# Patient Record
Sex: FEMALE | Race: BLACK OR AFRICAN AMERICAN | NOT HISPANIC OR LATINO
[De-identification: names, ages, dates, MRNs, and addresses within clinical notes are randomized per-mention and may not be internally consistent; named-entity substitution may affect disease eponyms.]

---

## 2017-02-15 ENCOUNTER — HOSPITAL (OUTPATIENT)
Dept: OTHER | Age: 27
End: 2017-02-15
Attending: EMERGENCY MEDICINE

## 2017-02-15 LAB
ALBUMIN SERPL-MCNC: 3.9 GM/DL (ref 3.6–5.1)
ALBUMIN/GLOB SERPL: 0.8 {RATIO} (ref 1–2.4)
ALP SERPL-CCNC: 99 UNIT/L (ref 45–117)
ALT SERPL-CCNC: 26 UNIT/L
ANALYZER ANC (IANC): ABNORMAL
ANION GAP SERPL CALC-SCNC: 14 MMOL/L (ref 10–20)
APAP SERPL-MCNC: <2 MCG/ML (ref 10–30)
AST SERPL-CCNC: 23 UNIT/L
BASOPHILS # BLD: 0 THOUSAND/MCL (ref 0–0.3)
BASOPHILS NFR BLD: 0 %
BILIRUB SERPL-MCNC: 0.2 MG/DL (ref 0.2–1)
BUN SERPL-MCNC: 15 MG/DL (ref 10–20)
BUN/CREAT SERPL: 21 (ref 7–25)
CALCIUM SERPL-MCNC: 8.7 MG/DL (ref 8.4–10.2)
CHLORIDE: 106 MMOL/L (ref 98–107)
CO2 SERPL-SCNC: 26 MMOL/L (ref 21–32)
CREAT SERPL-MCNC: 0.72 MG/DL (ref 0.51–0.95)
DIFFERENTIAL METHOD BLD: ABNORMAL
EOSINOPHIL # BLD: 0 THOUSAND/MCL (ref 0.1–0.5)
EOSINOPHIL NFR BLD: 0 %
ERYTHROCYTE [DISTWIDTH] IN BLOOD: 13.6 % (ref 11–15)
ETHANOL SERPL-MCNC: 160 MG/DL
GLOBULIN SER-MCNC: 4.6 GM/DL (ref 2–4)
GLUCOSE BLDC GLUCOMTR-MCNC: 96 MG/DL (ref 65–99)
GLUCOSE SERPL-MCNC: 88 MG/DL (ref 65–99)
HEMATOCRIT: 45.5 % (ref 36–46.5)
HGB BLD-MCNC: 15 GM/DL (ref 12–15.5)
LYMPHOCYTES # BLD: 1.6 THOUSAND/MCL (ref 1–4.8)
LYMPHOCYTES NFR BLD: 19 %
MCH RBC QN AUTO: 30.5 PG (ref 26–34)
MCHC RBC AUTO-ENTMCNC: 33 GM/DL (ref 32–36.5)
MCV RBC AUTO: 92.5 FL (ref 78–100)
MONOCYTES # BLD: 0.3 THOUSAND/MCL (ref 0.3–0.9)
MONOCYTES NFR BLD: 4 %
NEUTROPHILS # BLD: 6.6 THOUSAND/MCL (ref 1.8–7.7)
NEUTROPHILS NFR BLD: 77 %
NEUTS SEG NFR BLD: ABNORMAL %
PERCENT NRBC: ABNORMAL
PLATELET # BLD: 280 THOUSAND/MCL (ref 140–450)
POTASSIUM SERPL-SCNC: 4.1 MMOL/L (ref 3.4–5.1)
PROT SERPL-MCNC: 8.5 GM/DL (ref 6.4–8.2)
RBC # BLD: 4.92 MILLION/MCL (ref 4–5.2)
SALICYLATES SERPL-MCNC: 3.1 MG/DL
SODIUM SERPL-SCNC: 142 MMOL/L (ref 135–145)
WBC # BLD: 8.6 THOUSAND/MCL (ref 4.2–11)

## 2017-12-06 ENCOUNTER — HOSPITAL ENCOUNTER (EMERGENCY)
Facility: HOSPITAL | Age: 27
Discharge: HOME | End: 2017-12-06
Attending: EMERGENCY MEDICINE
Payer: MEDICAID

## 2017-12-06 VITALS
SYSTOLIC BLOOD PRESSURE: 156 MMHG | RESPIRATION RATE: 20 BRPM | BODY MASS INDEX: 43.59 KG/M2 | HEART RATE: 81 BPM | OXYGEN SATURATION: 100 % | WEIGHT: 250 LBS | TEMPERATURE: 98 F | DIASTOLIC BLOOD PRESSURE: 82 MMHG

## 2017-12-06 DIAGNOSIS — F19.920 DRUG INTOXICATION WITHOUT COMPLICATION: Primary | ICD-10-CM

## 2017-12-06 LAB — ETHANOL SERPL-MCNC: 163 MG/DL

## 2017-12-06 PROCEDURE — 99284 EMERGENCY DEPT VISIT MOD MDM: CPT

## 2017-12-06 PROCEDURE — 80320 DRUG SCREEN QUANTALCOHOLS: CPT

## 2017-12-06 NOTE — ED PROVIDER NOTES
Encounter Date: 12/6/2017       History     Chief Complaint   Patient presents with    Alcohol Intoxication     EMS states she drank a bottle of wine and wants to be checked out.     The patient drank a bottle of wine and is now intoxicated.  She is requesting to be checked out.      The history is provided by the patient and the EMS personnel.   Ingestion    She came to the ER via by ambulance. The current episode started just prior to arrival. Ingested substance: wine. There are no indicators of a suicidal attempt. She will not answer as to the context of the ingestion. The incident was witnessed. The incident was witnessed/reported by the patient. Her past medical history does not include suicide attempts, depression or suicide in family.     Review of patient's allergies indicates:  No Known Allergies  Past Medical History:   Diagnosis Date    Allergy      History reviewed. No pertinent surgical history.  History reviewed. No pertinent family history.  Social History   Substance Use Topics    Smoking status: Current Every Day Smoker     Packs/day: 0.50     Types: Cigarettes    Smokeless tobacco: Never Used    Alcohol use 3.6 oz/week     6 Glasses of wine per week      Comment: 6 bottles in last 3 days     Review of Systems   Neurological: Positive for speech difficulty and light-headedness. Negative for seizures, syncope and weakness.   Psychiatric/Behavioral: Positive for confusion.   All other systems reviewed and are negative.      Physical Exam     Initial Vitals [12/06/17 0144]   BP Pulse Resp Temp SpO2   122/72 85 20 98.2 °F (36.8 °C) 96 %      MAP       88.67         Physical Exam    Nursing note and vitals reviewed.  Constitutional: She appears well-developed and well-nourished. She appears lethargic.   HENT:   Head: Normocephalic and atraumatic.   Eyes: EOM are normal.   Neck: Normal range of motion. Neck supple.   Cardiovascular: Normal rate, regular rhythm, normal heart sounds and intact distal  pulses.   Pulmonary/Chest: Breath sounds normal.   Abdominal: Soft.   Musculoskeletal: Normal range of motion.   Neurological: She appears lethargic. GCS eye subscore is 4. GCS verbal subscore is 5. GCS motor subscore is 6.   Skin: Skin is warm and dry.         ED Course   Procedures  Labs Reviewed   ALCOHOL,MEDICAL (ETHANOL)             Medical Decision Making:   Clinical Tests:   Lab Tests: Ordered and Reviewed                   ED Course      Clinical Impression:   The encounter diagnosis was Drug intoxication without complication.    Disposition:   Disposition: Discharged  Condition: Stable                        Veronique Dubon MD  12/06/17 0431

## 2018-04-19 ENCOUNTER — HOSPITAL ENCOUNTER (EMERGENCY)
Facility: HOSPITAL | Age: 28
Discharge: PSYCHIATRIC OR SUBSTANCE USE FACILITY | End: 2018-04-20
Attending: FAMILY MEDICINE
Payer: MEDICAID

## 2018-04-19 DIAGNOSIS — F22 DELUSIONAL DISORDER: Primary | ICD-10-CM

## 2018-04-19 LAB
ALBUMIN SERPL BCP-MCNC: 4.2 G/DL
ALP SERPL-CCNC: 89 U/L
ALT SERPL W/O P-5'-P-CCNC: 26 U/L
AMPHET+METHAMPHET UR QL: NEGATIVE
ANION GAP SERPL CALC-SCNC: 11 MMOL/L
APAP SERPL-MCNC: <10 UG/ML
AST SERPL-CCNC: 23 U/L
B-HCG UR QL: NEGATIVE
BARBITURATES UR QL SCN>200 NG/ML: NEGATIVE
BASOPHILS # BLD AUTO: 0.02 K/UL
BASOPHILS NFR BLD: 0.3 %
BENZODIAZ UR QL SCN>200 NG/ML: NEGATIVE
BILIRUB SERPL-MCNC: 0.4 MG/DL
BILIRUB UR QL STRIP: NEGATIVE
BUN SERPL-MCNC: 12 MG/DL
BZE UR QL SCN: NEGATIVE
CALCIUM SERPL-MCNC: 9.7 MG/DL
CANNABINOIDS UR QL SCN: NEGATIVE
CHLORIDE SERPL-SCNC: 107 MMOL/L
CLARITY UR REFRACT.AUTO: CLEAR
CO2 SERPL-SCNC: 25 MMOL/L
COLOR UR AUTO: ABNORMAL
CREAT SERPL-MCNC: 0.89 MG/DL
CREAT UR-MCNC: >346.5 MG/DL
DIFFERENTIAL METHOD: NORMAL
EOSINOPHIL # BLD AUTO: 0 K/UL
EOSINOPHIL NFR BLD: 0.5 %
ERYTHROCYTE [DISTWIDTH] IN BLOOD BY AUTOMATED COUNT: 13 %
EST. GFR  (AFRICAN AMERICAN): >60 ML/MIN/1.73 M^2
EST. GFR  (NON AFRICAN AMERICAN): >60 ML/MIN/1.73 M^2
ETHANOL SERPL-MCNC: <10 MG/DL
GLUCOSE SERPL-MCNC: 96 MG/DL
GLUCOSE UR QL STRIP: NEGATIVE
HCT VFR BLD AUTO: 41.9 %
HGB BLD-MCNC: 13.6 G/DL
HGB UR QL STRIP: NEGATIVE
KETONES UR QL STRIP: ABNORMAL
LEUKOCYTE ESTERASE UR QL STRIP: NEGATIVE
LYMPHOCYTES # BLD AUTO: 2.8 K/UL
LYMPHOCYTES NFR BLD: 38.2 %
MCH RBC QN AUTO: 29.1 PG
MCHC RBC AUTO-ENTMCNC: 32.5 G/DL
MCV RBC AUTO: 90 FL
METHADONE UR QL SCN>300 NG/ML: NEGATIVE
MONOCYTES # BLD AUTO: 0.8 K/UL
MONOCYTES NFR BLD: 11.5 %
NEUTROPHILS # BLD AUTO: 3.6 K/UL
NEUTROPHILS NFR BLD: 49.4 %
NITRITE UR QL STRIP: NEGATIVE
OPIATES UR QL SCN: NEGATIVE
PCP UR QL SCN>25 NG/ML: NEGATIVE
PH UR STRIP: 5 [PH] (ref 5–8)
PLATELET # BLD AUTO: 271 K/UL
PMV BLD AUTO: 10.3 FL
POTASSIUM SERPL-SCNC: 4 MMOL/L
PROT SERPL-MCNC: 7.9 G/DL
PROT UR QL STRIP: ABNORMAL
RBC # BLD AUTO: 4.68 M/UL
SODIUM SERPL-SCNC: 143 MMOL/L
SP GR UR STRIP: 1.02 (ref 1–1.03)
TOXICOLOGY INFORMATION: ABNORMAL
URN SPEC COLLECT METH UR: ABNORMAL
UROBILINOGEN UR STRIP-ACNC: 1 EU/DL
WBC # BLD AUTO: 7.33 K/UL

## 2018-04-19 PROCEDURE — 81025 URINE PREGNANCY TEST: CPT

## 2018-04-19 PROCEDURE — 85025 COMPLETE CBC W/AUTO DIFF WBC: CPT

## 2018-04-19 PROCEDURE — 80329 ANALGESICS NON-OPIOID 1 OR 2: CPT

## 2018-04-19 PROCEDURE — 80307 DRUG TEST PRSMV CHEM ANLYZR: CPT

## 2018-04-19 PROCEDURE — 81003 URINALYSIS AUTO W/O SCOPE: CPT

## 2018-04-19 PROCEDURE — 80053 COMPREHEN METABOLIC PANEL: CPT

## 2018-04-19 PROCEDURE — 99285 EMERGENCY DEPT VISIT HI MDM: CPT

## 2018-04-19 PROCEDURE — G0425 INPT/ED TELECONSULT30: HCPCS | Mod: GT,AF,HB, | Performed by: PSYCHIATRY & NEUROLOGY

## 2018-04-19 PROCEDURE — 80320 DRUG SCREEN QUANTALCOHOLS: CPT

## 2018-04-20 VITALS
OXYGEN SATURATION: 100 % | RESPIRATION RATE: 18 BRPM | BODY MASS INDEX: 40.75 KG/M2 | DIASTOLIC BLOOD PRESSURE: 81 MMHG | HEART RATE: 70 BPM | HEIGHT: 63 IN | WEIGHT: 230 LBS | TEMPERATURE: 98 F | SYSTOLIC BLOOD PRESSURE: 124 MMHG

## 2018-04-20 NOTE — ED PROVIDER NOTES
"Encounter Date: 4/19/2018       History     Chief Complaint   Patient presents with    Psychiatric Evaluation     Pt states she had a "spiritual thing" happen at home and expressed to her family that something was going to happen to her family. Pt denies SI and HI.     Patient is brought into the ER by family for evaluation.  Family talked to patient and left.  Patient says she has been having spiritual dreams which has been worrying her.  She is worried about herself and family something bad going to happen.  Patient dreams people who do not believe car R scaring her.  Denies any suicidal, homicidal ideations.  She denies any visual or auditory hallucinations.  Patient has a chronic alcohol problem for the last few months.  She has been seeing psychologist for counseling.  Her last drink was about a week ago.  Patient also has behavioral issues which she did not want to discuss with me.  Patient agreed to call her sister.  Patient's sister describes that patient has been talking to herself and delusional.  She thinks people are coming out to kill her and she is trying to defend and kill them.  Patient has been closing dose and keeping away from people so that nobody can kill her.  Today she jumped out of the window because thought somebody came to kill her.  Patient also thinks somebody is transferring her soles.  She's been repeatedly calling her family and telling them that somebody is going to come and kill them to.      The history is provided by the patient.     Review of patient's allergies indicates:  No Known Allergies  Past Medical History:   Diagnosis Date    Allergy      History reviewed. No pertinent surgical history.  History reviewed. No pertinent family history.  Social History   Substance Use Topics    Smoking status: Current Every Day Smoker     Packs/day: 0.50     Types: Cigarettes, Cigars    Smokeless tobacco: Never Used    Alcohol use 8.4 oz/week     14 Cans of beer per week     Review of " Systems   Constitutional: Negative for activity change, appetite change and fever.   HENT: Negative for congestion, rhinorrhea and sore throat.    Eyes: Negative for pain, discharge, redness and itching.   Respiratory: Negative for cough and shortness of breath.    Cardiovascular: Negative for chest pain.   Gastrointestinal: Negative for abdominal distention, abdominal pain, diarrhea, nausea and vomiting.   Genitourinary: Negative for dysuria and frequency.   Musculoskeletal: Negative for back pain, gait problem, neck pain and neck stiffness.   Skin: Negative for rash.   Neurological: Negative for dizziness, facial asymmetry, weakness, light-headedness and headaches.   Hematological: Does not bruise/bleed easily.   Psychiatric/Behavioral: Positive for behavioral problems and sleep disturbance. Negative for confusion, decreased concentration, hallucinations, self-injury and suicidal ideas. The patient is not nervous/anxious.    All other systems reviewed and are negative.      Physical Exam     Initial Vitals [04/19/18 1921]   BP Pulse Resp Temp SpO2   128/86 87 18 98.6 °F (37 °C) 98 %      MAP       100         Physical Exam    Nursing note and vitals reviewed.  Constitutional: She appears well-developed and well-nourished.   HENT:   Head: Normocephalic.   Right Ear: External ear normal.   Left Ear: External ear normal.   Nose: Nose normal.   Mouth/Throat: Oropharynx is clear and moist.   Eyes: EOM are normal. Pupils are equal, round, and reactive to light.   Neck: Normal range of motion. Neck supple.   Cardiovascular: Normal rate, regular rhythm, normal heart sounds and intact distal pulses.   Pulmonary/Chest: Breath sounds normal. No respiratory distress. She has no wheezes. She has no rhonchi. She has no rales. She exhibits no tenderness.   Abdominal: Soft. Bowel sounds are normal. She exhibits no distension. There is no tenderness.   Musculoskeletal: Normal range of motion.   Neurological: She is alert and  oriented to person, place, and time. She has normal strength and normal reflexes. She displays normal reflexes. No cranial nerve deficit or sensory deficit.   Skin: Skin is warm. Capillary refill takes less than 2 seconds. No rash noted. No erythema.   Psychiatric: Her speech is normal and behavior is normal. Judgment normal. Her mood appears anxious. Thought content is paranoid and delusional. Cognition and memory are normal. She expresses no homicidal and no suicidal ideation.         ED Course   Procedures  Labs Reviewed   URINALYSIS - Abnormal; Notable for the following:        Result Value    Protein, UA Trace (*)     Ketones, UA 1+ (*)     All other components within normal limits   PREGNANCY TEST, URINE RAPID   DRUG SCREEN PANEL, URINE EMERGENCY             Medical Decision Making:   Initial Assessment:   Patient has been having delusional symptoms for the last 3 months.  Unable to cope up at home.  Denied medical care family brought to the ER for evaluation.  Patient tries to defend herself of not having any delusions but agreed to have dreams which are causing her severe anxiety and unable to cope up with her life.  She thinks people are killing her and tries to run away.  She thinks.  Spirits are changing  to different body..   Differential Diagnosis:       Indurated, raised, hallucination, bipolar, schizophrenia.  Drug influence.  Alcohol induced, delirium, alcohol abuse  Clinical Tests:   Lab Tests: Ordered and Reviewed  ED Management:  Patient has been experiencing delusional since last few months and unable to take care of herself and denied medical care.  Patient has been adamant to see a physician.  N also has chronic alcohol abuse.  Patient has been seeing a psychologist or rehabilitation.  Patient's sister helped in her history for having delusional disorder.  We'll consult psychiatrist to evaluate for PEC.  Other:   I have discussed this case with another health care provider.       <> Summary of  the Discussion: Dr. Chavarria psychiatrist has been consulted who advised for PEC for inpatient treatment.                  patient is medically cleared for inpatient psychiatric treatment.     Clinical Impression:   The encounter diagnosis was Delusional disorder.    Disposition:   Disposition: Transferred  Condition: Serious                        Neo Soriano MD  04/19/18 7282

## 2018-04-20 NOTE — ED NOTES
Spoke to Karol @ [facility]......advised no call back for Psych Consult as of yet.

## 2018-04-20 NOTE — ED NOTES
Patient is lying in bed with no signs or symptoms of distress noted.  Will continue to monitor patient.  Sitter at bedside.

## 2018-04-20 NOTE — CONSULTS
Tele-Consultation to Emergency Department from Psychiatry  1030  Please see previous notes:    Patient agreeable to consultation via telepsychiatry.    Consultation started: 4/19/2018 at 1030PM  The chief complaint leading to psychiatric consultation is: paranoia  This consultation was requested by , the Emergency Department attending physician.  The location of the consulting psychiatrist is [address] [place] [address].  The patient location is [facility].  The patient arrived at the ED at:     Also present with the patient at the time of the consultation: self    Patient Identification:  Ace Sheffield is a 27 y.o. female.    Patient information was obtained from patient.  Patient presented involuntarily to the Emergency Department     History of Present Illness:  Ms. Navarro is a 27 year old woman with the primary diagnosis of Psychotic Disorder, NOS. She presented to the hospital reporting visual and auditory hallucinations. She reports paranoid delusions that someone is trying to her attack her family .she reportedly jumped out of a window fearing that she was going to be harmed. She reports prior psychiatric treatment and prior psychiatric hospitalization.     Psychiatric History:   Hospitalization: Yes  Medication Trials: Yes  Suicide Attempts: no  Violence: no  Depression: no  Rossy: yes  AH's: yes  Delusions: paranoid    Review of Systems:  Negative    Past Medical History:   Past Medical History:   Diagnosis Date    Allergy         Seizures: no  Head trauma/l.o.c.: no  Wish to become pregnant[if female of childbearing age]: no    Allergies: none  Review of patient's allergies indicates:  No Known Allergies    Medications in ER: Medications - No data to display    Medications at home: unknown    Substance Abuse History:   Alchohol: no  Drug: no     Legal History:   Past charges/incarcerations: no  Pending charges: no    Family Psychiatric History: no    Social History:   History of  "Physical/Sexual Abuse: no  Education: hs    Employment/Disability: disabled   Financial: ok  Relationship Status/Sexual Orientation: hetero   Children: unknown   Housing Status: home  [employer]: [employer]   History: none   Recreational Activities: none  Access to Gun: no     Current Evaluation:     Constitutional  Vitals:  Vitals:    04/19/18 1921   BP: 128/86   Pulse: 87   Resp: 18   Temp: 98.6 °F (37 °C)   TempSrc: Oral   SpO2: 98%   Weight: 104.3 kg (230 lb)   Height: 5' 3" (1.6 m)      General:  unremarkable, age appropriate     Musculoskeletal  Muscle Strength/Tone:   moving arms normally   Gait & Station:   sitting on stretcher     Psychiatric  Level of Consciousness: alert  Orientation: oriented to person, place and time  Grooming: in hospital gown  Psychomotor Behavior: no agitation  Speech: normal in rate, rhythm and volume  Language: uses words appropriately  Mood: depressed  Affect: depressed  Thought Process: linear  Associations: tight  Thought Content: ah  Memory: intact  Attention: intact to interview  Fund of Knowledge: appears adequate  Insight: poor  Judgement: poor    Relevant Elements of Neurological Exam: no abnormality of posture noted    Assessment - Diagnosis - Goals:     Diagnosis/Impression: psychotic disorder, now    Rec: She requires inpatient psychiatric hospitalization     Time with patient: 20 minutes.     More than 50% of the time was spent counseling/coordinating care    Laboratory Data:   Labs Reviewed   URINALYSIS - Abnormal; Notable for the following:        Result Value    Protein, UA Trace (*)     Ketones, UA 1+ (*)     All other components within normal limits   DRUG SCREEN PANEL, URINE EMERGENCY - Abnormal; Notable for the following:     Creatinine, Random Ur >346.5 (*)     All other components within normal limits   PREGNANCY TEST, URINE RAPID   CBC W/ AUTO DIFFERENTIAL   COMPREHENSIVE METABOLIC PANEL   ALCOHOL,MEDICAL (ETHANOL)   ACETAMINOPHEN LEVEL         Consulting " clinician was informed of the encounter and consult note.    Consultation ended: 4/19/2018 at **

## 2018-04-20 NOTE — ED NOTES
Pt belongings: 1 white t-shirt, 1 pair of jeans, 1 pair of white tennis shoes, 1 pair of white socks, 1 purple head scarf, 1 concha jacket, 1 pair of sunglasses, and blue bag with personal belongings. Cell phone and  placed in personal blue bag by pt. Belongings locked away in locked cabinet.

## 2018-04-20 NOTE — ED NOTES
Pt accepted to [facility]. Accepting Dr. Tarah MD Call report @ [phone] per ALEM Tobar

## 2018-04-20 NOTE — ED NOTES
Patient is lying in bed with eyes closed.  No signs or symptoms of distress noted.  Will continue to monitor patient.  Sitter at bedside.

## 2021-05-04 ENCOUNTER — OFFICE VISIT (OUTPATIENT)
Dept: OBSTETRICS AND GYNECOLOGY | Facility: CLINIC | Age: 31
End: 2021-05-04
Payer: MEDICAID

## 2021-05-04 VITALS
HEIGHT: 63 IN | DIASTOLIC BLOOD PRESSURE: 84 MMHG | BODY MASS INDEX: 51.91 KG/M2 | WEIGHT: 293 LBS | SYSTOLIC BLOOD PRESSURE: 122 MMHG

## 2021-05-04 DIAGNOSIS — Z11.3 SCREENING EXAMINATION FOR STD (SEXUALLY TRANSMITTED DISEASE): ICD-10-CM

## 2021-05-04 DIAGNOSIS — Z01.419 ENCOUNTER FOR ANNUAL ROUTINE GYNECOLOGICAL EXAMINATION: Primary | ICD-10-CM

## 2021-05-04 DIAGNOSIS — Z12.4 PAP SMEAR FOR CERVICAL CANCER SCREENING: ICD-10-CM

## 2021-05-04 PROCEDURE — 99999 PR PBB SHADOW E&M-NEW PATIENT-LVL III: ICD-10-PCS | Mod: PBBFAC,,, | Performed by: OBSTETRICS & GYNECOLOGY

## 2021-05-04 PROCEDURE — 99385 PR PREVENTIVE VISIT,NEW,18-39: ICD-10-PCS | Mod: S$PBB,,, | Performed by: OBSTETRICS & GYNECOLOGY

## 2021-05-04 PROCEDURE — 88175 CYTOPATH C/V AUTO FLUID REDO: CPT | Performed by: OBSTETRICS & GYNECOLOGY

## 2021-05-04 PROCEDURE — 87481 CANDIDA DNA AMP PROBE: CPT | Mod: 59 | Performed by: OBSTETRICS & GYNECOLOGY

## 2021-05-04 PROCEDURE — 99385 PREV VISIT NEW AGE 18-39: CPT | Mod: S$PBB,,, | Performed by: OBSTETRICS & GYNECOLOGY

## 2021-05-04 PROCEDURE — 99203 OFFICE O/P NEW LOW 30 MIN: CPT | Mod: PBBFAC,PN | Performed by: OBSTETRICS & GYNECOLOGY

## 2021-05-04 PROCEDURE — 87624 HPV HI-RISK TYP POOLED RSLT: CPT | Performed by: OBSTETRICS & GYNECOLOGY

## 2021-05-04 PROCEDURE — 99999 PR PBB SHADOW E&M-NEW PATIENT-LVL III: CPT | Mod: PBBFAC,,, | Performed by: OBSTETRICS & GYNECOLOGY

## 2021-05-04 RX ORDER — RISPERIDONE 2 MG/1
2 TABLET ORAL NIGHTLY
COMMUNITY
Start: 2021-04-21 | End: 2022-12-15

## 2021-05-04 RX ORDER — ARIPIPRAZOLE 400 MG
400 KIT INTRAMUSCULAR
COMMUNITY
Start: 2021-04-16

## 2021-05-04 RX ORDER — TRAZODONE HYDROCHLORIDE 50 MG/1
50 TABLET ORAL NIGHTLY
COMMUNITY
Start: 2021-04-21

## 2021-05-04 RX ORDER — SPIRONOLACTONE 25 MG/1
25 TABLET ORAL 2 TIMES DAILY
COMMUNITY
Start: 2021-02-25 | End: 2022-12-15

## 2021-05-04 RX ORDER — NORELGESTROMIN AND ETHINYL ESTRADIOL 35; 150 UG/MG; UG/MG
1 PATCH TRANSDERMAL WEEKLY
Qty: 4 PATCH | Refills: 11 | Status: SHIPPED | OUTPATIENT
Start: 2021-05-04 | End: 2022-05-04

## 2021-05-04 RX ORDER — MINOCYCLINE HYDROCHLORIDE 100 MG/1
100 CAPSULE ORAL 2 TIMES DAILY
COMMUNITY
Start: 2021-02-25

## 2021-05-06 ENCOUNTER — PATIENT MESSAGE (OUTPATIENT)
Dept: OBSTETRICS AND GYNECOLOGY | Facility: CLINIC | Age: 31
End: 2021-05-06

## 2021-05-06 LAB
BACTERIAL VAGINOSIS DNA: POSITIVE
CANDIDA GLABRATA DNA: NEGATIVE
CANDIDA KRUSEI DNA: NEGATIVE
CANDIDA RRNA VAG QL PROBE: NEGATIVE
T VAGINALIS RRNA GENITAL QL PROBE: NEGATIVE

## 2021-05-06 RX ORDER — METRONIDAZOLE 500 MG/1
500 TABLET ORAL EVERY 12 HOURS
Qty: 14 TABLET | Refills: 0 | Status: SHIPPED | OUTPATIENT
Start: 2021-05-06 | End: 2021-05-13

## 2021-05-07 LAB
C TRACH RRNA SPEC QL NAA+PROBE: NEGATIVE
N GONORRHOEA RRNA SPEC QL NAA+PROBE: NEGATIVE

## 2021-05-10 ENCOUNTER — PATIENT MESSAGE (OUTPATIENT)
Dept: OBSTETRICS AND GYNECOLOGY | Facility: CLINIC | Age: 31
End: 2021-05-10

## 2021-05-10 ENCOUNTER — PATIENT MESSAGE (OUTPATIENT)
Dept: OBSTETRICS AND GYNECOLOGY | Facility: HOSPITAL | Age: 31
End: 2021-05-10

## 2021-05-12 LAB
HPV HR 12 DNA SPEC QL NAA+PROBE: NEGATIVE
HPV16 AG SPEC QL: NEGATIVE
HPV18 DNA SPEC QL NAA+PROBE: NEGATIVE

## 2021-05-13 ENCOUNTER — PATIENT MESSAGE (OUTPATIENT)
Dept: OBSTETRICS AND GYNECOLOGY | Facility: CLINIC | Age: 31
End: 2021-05-13

## 2021-05-13 LAB
FINAL PATHOLOGIC DIAGNOSIS: NORMAL
Lab: NORMAL

## 2021-10-02 ENCOUNTER — PATIENT MESSAGE (OUTPATIENT)
Dept: OBSTETRICS AND GYNECOLOGY | Facility: CLINIC | Age: 31
End: 2021-10-02

## 2021-12-12 ENCOUNTER — HOSPITAL ENCOUNTER (EMERGENCY)
Facility: HOSPITAL | Age: 31
Discharge: HOME | End: 2021-12-12
Attending: FAMILY MEDICINE
Payer: MEDICAID

## 2021-12-12 VITALS
HEIGHT: 64 IN | SYSTOLIC BLOOD PRESSURE: 150 MMHG | DIASTOLIC BLOOD PRESSURE: 85 MMHG | BODY MASS INDEX: 50.02 KG/M2 | TEMPERATURE: 99 F | RESPIRATION RATE: 17 BRPM | HEART RATE: 104 BPM | OXYGEN SATURATION: 99 % | WEIGHT: 293 LBS

## 2021-12-12 DIAGNOSIS — H10.32 ACUTE BACTERIAL CONJUNCTIVITIS OF LEFT EYE: Primary | ICD-10-CM

## 2021-12-12 PROCEDURE — 99283 EMERGENCY DEPT VISIT LOW MDM: CPT | Mod: ER

## 2021-12-12 PROCEDURE — 25000003 PHARM REV CODE 250: Mod: ER | Performed by: PHYSICIAN ASSISTANT

## 2021-12-12 RX ORDER — OFLOXACIN 3 MG/ML
SOLUTION/ DROPS OPHTHALMIC
Qty: 5 ML | Refills: 0 | Status: SHIPPED | OUTPATIENT
Start: 2021-12-12 | End: 2021-12-19

## 2021-12-12 RX ORDER — PROPARACAINE HYDROCHLORIDE 5 MG/ML
1 SOLUTION/ DROPS OPHTHALMIC
Status: COMPLETED | OUTPATIENT
Start: 2021-12-12 | End: 2021-12-12

## 2021-12-12 RX ADMIN — FLUORESCEIN SODIUM 1 EACH: 1 STRIP OPHTHALMIC at 01:12

## 2021-12-12 RX ADMIN — PROPARACAINE HYDROCHLORIDE 1 DROP: 5 SOLUTION/ DROPS OPHTHALMIC at 01:12

## 2022-01-04 ENCOUNTER — HOSPITAL ENCOUNTER (EMERGENCY)
Facility: HOSPITAL | Age: 32
Discharge: HOME | End: 2022-01-04
Attending: EMERGENCY MEDICINE
Payer: MEDICAID

## 2022-01-04 VITALS
HEART RATE: 88 BPM | DIASTOLIC BLOOD PRESSURE: 85 MMHG | WEIGHT: 293 LBS | TEMPERATURE: 99 F | BODY MASS INDEX: 50.02 KG/M2 | HEIGHT: 64 IN | SYSTOLIC BLOOD PRESSURE: 164 MMHG | OXYGEN SATURATION: 99 % | RESPIRATION RATE: 18 BRPM

## 2022-01-04 DIAGNOSIS — Z20.822 SUSPECTED COVID-19 VIRUS INFECTION: Primary | ICD-10-CM

## 2022-01-04 DIAGNOSIS — B34.9 VIRAL SYNDROME: ICD-10-CM

## 2022-01-04 PROCEDURE — U0003 INFECTIOUS AGENT DETECTION BY NUCLEIC ACID (DNA OR RNA); SEVERE ACUTE RESPIRATORY SYNDROME CORONAVIRUS 2 (SARS-COV-2) (CORONAVIRUS DISEASE [COVID-19]), AMPLIFIED PROBE TECHNIQUE, MAKING USE OF HIGH THROUGHPUT TECHNOLOGIES AS DESCRIBED BY CMS-2020-01-R: HCPCS | Mod: ER | Performed by: PHYSICIAN ASSISTANT

## 2022-01-04 PROCEDURE — 99284 EMERGENCY DEPT VISIT MOD MDM: CPT | Mod: ER

## 2022-01-04 PROCEDURE — U0005 INFEC AGEN DETEC AMPLI PROBE: HCPCS | Performed by: PHYSICIAN ASSISTANT

## 2022-01-04 RX ORDER — ONDANSETRON 4 MG/1
4 TABLET, ORALLY DISINTEGRATING ORAL EVERY 8 HOURS PRN
Qty: 12 TABLET | Refills: 0 | Status: SHIPPED | OUTPATIENT
Start: 2022-01-04 | End: 2022-01-08

## 2022-01-04 RX ORDER — FLUTICASONE PROPIONATE 50 MCG
1 SPRAY, SUSPENSION (ML) NASAL 2 TIMES DAILY PRN
Qty: 15 G | Refills: 0 | Status: SHIPPED | OUTPATIENT
Start: 2022-01-04 | End: 2022-12-15

## 2022-01-04 NOTE — ED PROVIDER NOTES
Encounter Date: 1/4/2022       History     Chief Complaint   Patient presents with    COVID-19 Concerns     + exposure - body aches and sinus congestion, also no taste      Pt is a 32 y/o female who presents to ED with c/o headaches, chills, nasal congestion, loss of taste/smell. Onset 3 days ago. Reports 1 episode of emesis. Pt reports sister is covid positive.        Review of patient's allergies indicates:  No Known Allergies  Past Medical History:   Diagnosis Date    Allergy      Past Surgical History:   Procedure Laterality Date    APPENDECTOMY  2009     Family History   Problem Relation Age of Onset    Breast cancer Neg Hx     Colon cancer Neg Hx     Ovarian cancer Neg Hx      Social History     Tobacco Use    Smoking status: Current Every Day Smoker     Packs/day: 0.25     Types: Cigarettes, Cigars    Smokeless tobacco: Never Used   Substance Use Topics    Alcohol use: Not Currently     Alcohol/week: 14.0 standard drinks     Types: 14 Cans of beer per week    Drug use: No     Review of Systems   Constitutional: Positive for chills. Negative for diaphoresis, fatigue and fever.   HENT: Positive for congestion. Negative for rhinorrhea and sore throat.         + loss of taste/smell    Respiratory: Negative for cough and shortness of breath.    Cardiovascular: Negative for chest pain.   Gastrointestinal: Negative for diarrhea, nausea and vomiting.   Musculoskeletal: Negative for arthralgias, back pain and myalgias.   Skin: Negative for rash.   Neurological: Positive for headaches. Negative for weakness.       Physical Exam     Initial Vitals [01/04/22 1412]   BP Pulse Resp Temp SpO2   (!) 164/85 88 18 98.8 °F (37.1 °C) 99 %      MAP       --         Physical Exam    Nursing note and vitals reviewed.  Constitutional: She appears well-developed and well-nourished. She is not diaphoretic. No distress.   HENT:   Head: Normocephalic and atraumatic.   Eyes: Conjunctivae and EOM are normal. Pupils are equal,  round, and reactive to light.   Neck: Neck supple.   Normal range of motion.  Cardiovascular: Normal rate, regular rhythm, normal heart sounds and intact distal pulses.   Pulmonary/Chest: Breath sounds normal. No respiratory distress.   Abdominal: Abdomen is soft. Bowel sounds are normal. There is no abdominal tenderness.   Musculoskeletal:         General: No tenderness or edema. Normal range of motion.      Cervical back: Normal range of motion and neck supple.     Neurological: She is alert and oriented to person, place, and time. She has normal strength. GCS score is 15. GCS eye subscore is 4. GCS verbal subscore is 5. GCS motor subscore is 6.   Skin: Skin is warm. Capillary refill takes less than 2 seconds. No rash noted.         ED Course   Procedures  Labs Reviewed   SARS-COV-2 (COVID-19) QUALITATIVE PCR          Imaging Results    None          Medications - No data to display  Medical Decision Making:   ED Management:  COVID pending.   Vital signs are stable, nontoxic appearing.  No respiratory distress.  Patient will be discharged home.  Discussed CDC guidelines if patient were to test positive for COVID.  Discussed symptomatic and supportive care measures for viral illness.  ED precautions were discussed to return for any worsening or change in symptoms.  Patient voiced understanding and agreed with the plan of care.  All questions were answered.               ED Course as of 01/04/22 1653   Tue Jan 04, 2022   1411 Pt is a 32 y/o female who presents to ED with c/o headaches, chills, nasal congestion, loss of taste/smell. Onset 3 days ago. Reports 1 episode of emesis. Pt reports sister is covid positive.  [EM]      ED Course User Index  [EM] Ping Sheldon PA-C             Clinical Impression:   Final diagnoses:  [Z20.822] Suspected COVID-19 virus infection (Primary)  [B34.9] Viral syndrome          ED Disposition Condition    Discharge Stable        ED Prescriptions     Medication Sig Dispense Start  Date End Date Auth. Provider    ondansetron (ZOFRAN-ODT) 4 MG TbDL Take 1 tablet (4 mg total) by mouth every 8 (eight) hours as needed (nausea). 12 tablet 1/4/2022 1/8/2022 Ping Sheldon PA-C    fluticasone propionate (FLONASE) 50 mcg/actuation nasal spray 1 spray (50 mcg total) by Each Nostril route 2 (two) times daily as needed for Rhinitis. 15 g 1/4/2022  Ping Sheldon PA-C        Follow-up Information     Follow up With Specialties Details Why Contact Info    Primary Care Physician               Ping Sheldon PA-C  01/04/22 7284

## 2022-01-05 ENCOUNTER — PATIENT MESSAGE (OUTPATIENT)
Dept: ADMINISTRATIVE | Facility: OTHER | Age: 32
End: 2022-01-05
Payer: MEDICAID

## 2022-01-06 LAB
SARS-COV-2 RNA RESP QL NAA+PROBE: DETECTED
SARS-COV-2- CYCLE NUMBER: 19

## 2022-03-26 ENCOUNTER — TELEPHONE (OUTPATIENT)
Dept: FAMILY MEDICINE | Facility: CLINIC | Age: 32
End: 2022-03-26
Payer: MEDICAID

## 2022-03-26 NOTE — TELEPHONE ENCOUNTER
Pt is now scheduled to establish care with  ----- Message from John George sent at 3/25/2022  4:46 PM CDT -----  Type:  Sooner Apoointment Request    Caller is requesting a sooner appointment.    Name of Caller:Ace Sheffield  When is the first available appointment?no avail  Symptoms:check up  Would the patient rather a call back or a response via [facility]? Call back  Best Call Back Number:[phone]  Additional Information: Patient requesting call back for a sooner appointment.

## 2022-03-29 ENCOUNTER — TELEPHONE (OUTPATIENT)
Dept: FAMILY MEDICINE | Facility: CLINIC | Age: 32
End: 2022-03-29
Payer: MEDICAID

## 2022-03-29 DIAGNOSIS — M72.2 PLANTAR FASCIAL FIBROMATOSIS: Primary | ICD-10-CM

## 2022-03-29 NOTE — TELEPHONE ENCOUNTER
Calling pt to discuss appt ----- Message from Eliz Borden sent at 3/29/2022 11:29 AM CDT -----  Contact: pt    Type:  Needs Medical Advice    Who Called: pt  Symptoms (please be specific):    How long has patient had these symptoms:  Pharmacy name and phone #:    Would the patient rather a call back or a response via [facility]?   Best Call Back Number: [phone]  Additional Information: pt has appt with Dr. Randall 5/10 but really kimberli prefer a female physician. Pt is aware that provider doesn't take medicaid . Pt asked to to please ask provider since she is listed under the [facility] portal taking medicaid .

## 2022-03-31 ENCOUNTER — CLINICAL SUPPORT (OUTPATIENT)
Dept: REHABILITATION | Facility: HOSPITAL | Age: 32
End: 2022-03-31
Payer: MEDICAID

## 2022-03-31 DIAGNOSIS — M25.672 DECREASED RANGE OF MOTION OF LEFT ANKLE: ICD-10-CM

## 2022-03-31 DIAGNOSIS — R29.898 WEAKNESS OF BOTH LOWER EXTREMITIES: ICD-10-CM

## 2022-03-31 DIAGNOSIS — M79.672 PAIN OF LEFT HEEL: ICD-10-CM

## 2022-03-31 PROCEDURE — 97161 PT EVAL LOW COMPLEX 20 MIN: CPT | Mod: PO

## 2022-03-31 NOTE — PLAN OF CARE
[facility] [place] [facility]   Physical Therapy Initial Evaluation     Date: 3/31/2022   Name: Ace Sheffield  Clinic Number: [id number]    Therapy Diagnosis:   Encounter Diagnoses   Name Primary?    Weakness of both lower extremities     Pain of left heel     Decreased range of motion of left ankle      Physician: Jayson Smith DPM    Physician Orders: PT Eval and Treat   Medical Diagnosis from Referral: M72.2 (ICD-10-CM) - Plantar fascial fibromatosis  Evaluation Date: 3/31/2022  Authorization Period Expiration: 12/31/2022  Plan of Care Expiration: 5/31/2022  Progress Note Due: 4/30/2022  Visit # / Visits authorized: 1/ 1   FOTO: 0/5    Precautions: Standard     Time In: 1:15 pm  Time Out: 2:00 pm  Total Appointment Time (timed & untimed codes): 45 minutes      SUBJECTIVE     Date of onset: 2015    History of current condition - Ace reports: that she has problems with her L foot/heel. 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 She used to wear slip resistant shoes when standing all day long. Now she has to stand for long periods of time for work, and she has pain in both heels. When she has off days her pain goes away. She was taking oral steroids prescribed by her foot doctor and they eventually stopped working. She states she has been taking ibuprofen once in a while for aches.  The pain would come when she would stand to do things in the kitchen, but it has gotten worse since she started this job.    Falls: None reported    Imaging, None available    Prior Therapy: No  Social History: lives with grandparents  Occupation: [employer] ([employer])  Prior Level of Function: Independent  Current Level of Function: Independent    Pain:  Current 1/10, worst 10/10, best 0/10   Location: B heels (L>R)  Description: Throbbing, Numb and Sharp  Aggravating Factors: Standing still for >3hrs  Easing Factors: pain  medication, lying down and rest    Patients goals: no pain, standing for work, not have low back pain     Medical History:   Past Medical History:   Diagnosis Date    Allergy        Surgical History:   Ace Sheffield  has a past surgical history that includes Appendectomy (2009).    Medications:   Ace has a current medication list which includes the following prescription(s): abilify maintena, diphenhydramine, doxycycline, fluticasone propionate, ketoconazole, minocycline, norelgestromin-ethinyl estradiol, risperidone, spironolactone, and trazodone.    Allergies:   Review of patient's allergies indicates:  No Known Allergies       OBJECTIVE     Observation: Pt is 32 yo F presenting to therapy in no apparent distress.    Gait: Pt does not display significant gait abnormalities    Range of Motion: AROM (PROM):    Ankle Left Right   Dorsiflexion 4 (10) degrees 10 (15) degrees   Plantarflexion 50 (60) degrees 60 (66) degrees   Inversion 25 (35) degrees 28 (36) degrees   Eversion 30 (45) degrees 45 (50) degrees   Subtalar Inv. NT NT   Subtalar Ever. NT NT       Strength:  Hip Left Right   Flexion 4-/5 3+/5   Abduction 5/5 4-/5   Adduction 4/5 4/5   Extension 4-/5 4/5   External Rot NT NT   Internal Rot NT NT     Knee Left Right   Extension 4+/5 4/5   Flexion 4/5 5/5     Ankle Left Right   Dorsiflexion 4-/5 5/5   Plantarflexion 4+/5 5/5   Inversion 5/5 4/5   Eversion 5/5 4/5       Joint Mobility: No hypomobility noted; Some pain provocation with lateral subtalar mobilization    Palpation: TTP around L heel and lateral ankle peroneal tendon    Sensation: Grossly intact      Limitation/Restriction for FOTO Foot Survey    Therapist reviewed FOTO scores for Ace Sheffield on 3/31/2022.   FOTO documents entered into EPIC - see Media section.    Limitation Score: 68%         TREATMENT     Total Treatment time (time-based codes) separate from Evaluation: 0 minutes      Ace received the treatments listed below:       therapeutic exercises to develop strength and ROM for 0 minutes including:    Date 3/31/2022   Visit 1/1   POC exp 5/31/2022   PN 4/30/2022   FTF 4/30/2022   FOTO 0/5       bike    Mat/Seated:    gastroc stretch    Ankle tband   4 directions    BAPS (seated)    Towel crunch    Towel inv/ev    Ankle AROM    Plantar fascia rolls    Standing:    Heel raise    BAPS    SLS    Seen by EG         PATIENT EDUCATION AND HOME EXERCISES     Education provided:   - importance of consistent performance of HEP  - role of PT/PTA    Written Home Exercises Provided: yes. Exercises were reviewed and Ace was able to demonstrate them prior to the end of the session.  Ace demonstrated good  understanding of the education provided. See EMR under Patient Instructions for exercises provided during therapy sessions.    ASSESSMENT     Ace is a 31 y.o. female referred to outpatient Physical Therapy with a medical diagnosis of plantar fascial fibromatosis. Patient presents with minimal pain in her foot, explaining that the pain worsens when she stands for long periods of time which she does for her job, and is lessened after resting. She displays some ROM and strength deficits in L>R.     Patient prognosis is Good.   Patient will benefit from skilled outpatient Physical Therapy to address the deficits stated above and in the chart below, provide patient /family education, and to maximize patient's level of independence.     Plan of care discussed with patient: Yes  Patient's spiritual, cultural and educational needs considered and patient is agreeable to the plan of care and goals as stated below:     Anticipated Barriers for therapy: None    Medical Necessity is demonstrated by the following  History  Co-morbidities and personal factors that may impact the plan of care Co-morbidities:   None    Personal Factors:   no deficits     low   Examination  Body Structures and Functions, activity limitations and participation  restrictions that may impact the plan of care Body Regions:   lower extremities    Body Systems:    ROM  strength    Participation Restrictions:   None    Activity limitations:   Learning and applying knowledge  no deficits    General Tasks and Commands  no deficits    Communication  no deficits    Mobility  lifting and carrying objects  moving around using equipment (WC)  using transportation (bus, train, plane, car)  driving (bike, car, motorcycle)    Self care  no deficits    Domestic Life  shopping  cooking  doing house work (cleaning house, washing dishes, laundry)    Interactions/Relationships  no deficits    Life Areas  no deficits    Community and Social Life  community life  recreation and leisure         low   Clinical Presentation stable and uncomplicated low   Decision Making/ Complexity Score: low     Goals:  Short Term Goals: 4 weeks   1. This patient will be independent with a basic HEP. In progress, not met  2. This patient will increase B LE strength by 1 grade in order to be able to perform prolonged standing for her job responsibilities with no difficulty. In progress, not met  3. This patient will have a pain rating of 7/10 at worst with ADLs. In progress, not met  4. Patient able to score greater than or equal to 50% on the FOTO Foot Survey indicating patient is 50% impaired, limited, or restricted and demonstrating overall decreased foot pain with functional activities. In progress, not met     Long Term Goals 8 weeks   1. This patient will be independent with an updated HEP. In progress, not met  2. This patient will increase B LE strength to 4+/5 in order to be able to perform usual household duties and job responsibilities with no complaints of pain. In progress, not met  3. This patient will have a pain rating of 5/10 at worst with ADLs. In progress, not met  4. Patient able to score greater than or equal to 60% on the FOTO Foot Survey indicating patient is 40% impaired, limited, or  restricted and demonstrating overall decreased foot pain with functional activities. In progress, not met    PLAN   Plan of care Certification: 3/31/2022 to 5/31/2022.    Outpatient Physical Therapy 2 times weekly for 8 weeks to include the following interventions: Gait Training, Manual Therapy, Moist Heat/ Ice, Neuromuscular Re-ed, Patient Education, Therapeutic Activities and Therapeutic Exercise.     Yeimy Turner, PT      I CERTIFY THE NEED FOR THESE SERVICES FURNISHED UNDER THIS PLAN OF TREATMENT AND WHILE UNDER MY CARE   Physician's comments:     Physician's Signature: ___________________________________________________

## 2022-04-05 DIAGNOSIS — R20.2 PARESTHESIA OF SKIN: Primary | ICD-10-CM

## 2022-04-07 ENCOUNTER — CLINICAL SUPPORT (OUTPATIENT)
Dept: REHABILITATION | Facility: HOSPITAL | Age: 32
End: 2022-04-07
Payer: MEDICAID

## 2022-04-07 DIAGNOSIS — M79.672 PAIN OF LEFT HEEL: ICD-10-CM

## 2022-04-07 DIAGNOSIS — M25.672 DECREASED RANGE OF MOTION OF LEFT ANKLE: ICD-10-CM

## 2022-04-07 DIAGNOSIS — R29.898 WEAKNESS OF BOTH LOWER EXTREMITIES: Primary | ICD-10-CM

## 2022-04-07 PROCEDURE — 97110 THERAPEUTIC EXERCISES: CPT | Mod: PO

## 2022-04-07 NOTE — PROGRESS NOTES
"  Physical Therapy Treatment Note     Name: Ace Sheffield  Clinic Number: [id number]    Therapy Diagnosis:   Encounter Diagnoses   Name Primary?    Weakness of both lower extremities Yes    Pain of left heel     Decreased range of motion of left ankle      Physician: Jayson Smith, INDRA    Visit Date: 4/7/2022    Physician Orders: PT Eval and Treat   Medical Diagnosis from Referral: M72.2 (ICD-10-CM) - Plantar fascial fibromatosis  Evaluation Date: 3/31/2022  Authorization Period Expiration: 12/31/2022  Plan of Care Expiration: 5/31/2022  Progress Note Due: 4/30/2022  Visit # / Visits authorized: 1/ 20   FOTO: 0/5    Time In: 9:45 am  Time Out: 10:25 am  Total Billable Time: 40 minutes    Precautions: Standard    Subjective     Pt reports: she is feeling good today.  She was not compliant with home exercise program.  Response to previous treatment: none  Functional change: none    Pain: 0/10  Location: L heel and ankle    Objective     Ace received therapeutic exercises to develop strength and ROM for 40 minutes including:     Date 4/7/2022 3/31/2022   Visit 1/20 1/1   POC exp 5/31/2022 5/31/2022   PN 4/30/2022 4/30/2022   FTF 4/30/2022 4/30/2022   FOTO 1/5 0/5          bike 5' at L4     Mat/Seated:      gastroc stretch 3 x 30" L     Ankle tband   4 directions 2 x 10 RTB L     Ankle ABCs 1x L    BAPS (seated) 20x (fwd/bwd; side to side; cw/ccw)     Towel crunch      Towel inv/ev      Ankle AROM      Plantar fascia rolls 2' (spiked ball)     Seated hip flexion 3 x 10    Standing:      Heel raise      BAPS      SLS      Seen by EG EG             Home Exercises Provided and Patient Education Provided     Education provided:   - importance of consistent performance of HEP    Written Home Exercises Provided: yes.  Exercises were reviewed and Ace was able to demonstrate them prior to the end of the session.  Ace demonstrated good  understanding of the education provided.     Assessment     Ace " presents to therapy wearing flip flops and PT explains that this footwear is not good for long term use. PT also recommends that she consider ensuring that she has appropriate footwear for work with increased foot support and stability to decrease pain with prolonged standing. While performing ankle ABCs she states that she has a sharp pain going from her anterolateral hip down, with the origin appearing more as if the L hip flexors are affected. She explains that she just started to have this pain yesterday. She tolerates this session well with minimal to no pain provocation throughout session. She requires minimal to moderate VCs and TCs for appropriate body mechanics and for postural awareness in order to avoid compensatory movements.     Ace Is progressing well towards her goals.   Pt prognosis is Good.     Pt will continue to benefit from skilled outpatient physical therapy to address the deficits listed in the problem list box on initial evaluation, provide pt/family education and to maximize pt's level of independence in the home and community environment.     Pt's spiritual, cultural and educational needs considered and pt agreeable to plan of care and goals.     Anticipated barriers to physical therapy: None    Goals:   Short Term Goals: 4 weeks   1. This patient will be independent with a basic HEP. In progress, not met  2. This patient will increase B LE strength by 1 grade in order to be able to perform prolonged standing for her job responsibilities with no difficulty. In progress, not met  3. This patient will have a pain rating of 7/10 at worst with ADLs. In progress, not met  4. Patient able to score greater than or equal to 50% on the FOTO Foot Survey indicating patient is 50% impaired, limited, or restricted and demonstrating overall decreased foot pain with functional activities. In progress, not met     Long Term Goals 8 weeks   1. This patient will be independent with an updated HEP. In  progress, not met  2. This patient will increase B LE strength to 4+/5 in order to be able to perform usual household duties and job responsibilities with no complaints of pain. In progress, not met  3. This patient will have a pain rating of 5/10 at worst with ADLs. In progress, not met  4. Patient able to score greater than or equal to 60% on the FOTO Foot Survey indicating patient is 40% impaired, limited, or restricted and demonstrating overall decreased foot pain with functional activities. In progress, not met    Plan     Continue with PT POC and progress as tolerated.     Yeimy Turner, PT

## 2022-04-13 ENCOUNTER — CLINICAL SUPPORT (OUTPATIENT)
Dept: REHABILITATION | Facility: HOSPITAL | Age: 32
End: 2022-04-13
Payer: MEDICAID

## 2022-04-13 DIAGNOSIS — M25.672 DECREASED RANGE OF MOTION OF LEFT ANKLE: ICD-10-CM

## 2022-04-13 DIAGNOSIS — M79.672 PAIN OF LEFT HEEL: ICD-10-CM

## 2022-04-13 DIAGNOSIS — R29.898 WEAKNESS OF BOTH LOWER EXTREMITIES: Primary | ICD-10-CM

## 2022-04-13 PROCEDURE — 97110 THERAPEUTIC EXERCISES: CPT | Mod: PO,CQ

## 2022-04-13 NOTE — PROGRESS NOTES
"  Physical Therapy Treatment Note     Name: Ace Sheffield  Clinic Number: [id number]    Therapy Diagnosis:   Encounter Diagnoses   Name Primary?    Weakness of both lower extremities Yes    Pain of left heel     Decreased range of motion of left ankle      Physician: Jayson Smith DPM    Visit Date: 4/13/2022    Physician Orders: PT Eval and Treat   Medical Diagnosis from Referral: M72.2 (ICD-10-CM) - Plantar fascial fibromatosis  Evaluation Date: 3/31/2022  Authorization Period Expiration: 12/31/2022  Plan of Care Expiration: 5/31/2022  Progress Note Due: 4/30/2022  Visit # / Visits authorized: 2/ 20   FOTO: 1/5    Time In: 1:10 pm  Time Out: 1:55 pm  Total Billable Time: 40 minutes    Precautions: Standard    Subjective     Pt reports: my L hip is bothering me   She was not compliant with home exercise program.  Response to previous treatment: none  Functional change: none    Pain: 8/10  Location: L heel and ankle    Objective     Ace received therapeutic exercises to develop strength and ROM for 40 minutes including:     Date 4/13/2022 4/7/2022 3/31/2022   Visit 2/20 1/20 1/1   POC exp  5/31/2022 5/31/2022   PN  4/30/2022 4/30/2022   FTF  4/30/2022 4/30/2022   FOTO 2/5 1/5 0/5           bike 5' at L4  5' at L4     Mat/Seated:       LTR 2 min      Hip flexor stretch  3 x 30" L      Piriformis stretch  3 x 20" L      HS stretch   3 x 20" L      gastroc stretch  3 x 30" L     Ankle tband   4 directions  2 x 10 RTB L     Ankle ABCs  1x L    BAPS (seated)  20x (fwd/bwd; side to side; cw/ccw)     Toe yoga   2 min      Towel crunch 2 min       Towel inv/ev       Ankle AROM       Plantar fascia rolls 3 min blue  Dowel  2' (spiked ball)     Seated hip flexion  3 x 10    Standing:       Soleus stretch  3 x 30"      Calf stretch  3 x 30"      Heel raise/toe raise 2 x 10       BAPS       SLS       Seen by EM EG EG             Home Exercises Provided and Patient Education Provided     Education provided:   - " importance of consistent performance of HEP    Written Home Exercises Provided: yes.  Exercises were reviewed and Ace was able to demonstrate them prior to the end of the session.  Ace demonstrated good  understanding of the education provided.     Assessment     Ace presents to therapy with chief complaints of L hip and lower back pain. Patient felt some relief after exercises, patient reports feeling numbness in the toe after a long day of work. Patient ended session stating she felt better than when she arrived.     Ace Is progressing well towards her goals.   Pt prognosis is Good.     Pt will continue to benefit from skilled outpatient physical therapy to address the deficits listed in the problem list box on initial evaluation, provide pt/family education and to maximize pt's level of independence in the home and community environment.     Pt's spiritual, cultural and educational needs considered and pt agreeable to plan of care and goals.     Anticipated barriers to physical therapy: None    Goals:   Short Term Goals: 4 weeks   1. This patient will be independent with a basic HEP. In progress, not met  2. This patient will increase B LE strength by 1 grade in order to be able to perform prolonged standing for her job responsibilities with no difficulty. In progress, not met  3. This patient will have a pain rating of 7/10 at worst with ADLs. In progress, not met  4. Patient able to score greater than or equal to 50% on the FOTO Foot Survey indicating patient is 50% impaired, limited, or restricted and demonstrating overall decreased foot pain with functional activities. In progress, not met     Long Term Goals 8 weeks   1. This patient will be independent with an updated HEP. In progress, not met  2. This patient will increase B LE strength to 4+/5 in order to be able to perform usual household duties and job responsibilities with no complaints of pain. In progress, not met  3. This patient  will have a pain rating of 5/10 at worst with ADLs. In progress, not met  4. Patient able to score greater than or equal to 60% on the FOTO Foot Survey indicating patient is 40% impaired, limited, or restricted and demonstrating overall decreased foot pain with functional activities. In progress, not met    Plan     Continue with PT POC and progress as tolerated.     Agustin Ramirez, PTA

## 2022-04-19 ENCOUNTER — CLINICAL SUPPORT (OUTPATIENT)
Dept: REHABILITATION | Facility: HOSPITAL | Age: 32
End: 2022-04-19
Payer: MEDICAID

## 2022-04-19 DIAGNOSIS — R29.898 WEAKNESS OF BOTH LOWER EXTREMITIES: Primary | ICD-10-CM

## 2022-04-19 DIAGNOSIS — M79.672 PAIN OF LEFT HEEL: ICD-10-CM

## 2022-04-19 DIAGNOSIS — M25.672 DECREASED RANGE OF MOTION OF LEFT ANKLE: ICD-10-CM

## 2022-04-19 NOTE — PROGRESS NOTES
"  Physical Therapy Treatment Note     Name: Ace Sheffield  Clinic Number: [id number]    Therapy Diagnosis:   Encounter Diagnoses   Name Primary?    Weakness of both lower extremities Yes    Pain of left heel     Decreased range of motion of left ankle      Physician: Jayson Smith DPM    Visit Date: 4/19/2022    Physician Orders: PT Eval and Treat   Medical Diagnosis from Referral: M72.2 (ICD-10-CM) - Plantar fascial fibromatosis  Evaluation Date: 3/31/2022  Authorization Period Expiration: 12/31/2022  Plan of Care Expiration: 5/31/2022  Progress Note Due: 4/30/2022  Visit # / Visits authorized: 2/ 20   FOTO: 1/5    Time In: 1:10 pm  Time Out: 1:55 pm  Total Billable Time: 40 minutes    Precautions: Standard    Subjective     Pt reports: my L hip was bothering me Sunday after picking things ups but my heel is bothering me mostly   She was not compliant with home exercise program.  Response to previous treatment: none  Functional change: none    Pain: 8/10  Location: L heel and ankle    Objective     Ace received therapeutic exercises to develop strength and ROM for 40 minutes including:     Date 4/19/2022 4/13/2022 4/7/2022 3/31/2022   Visit 3/20 2/20 1/20 1/1   POC exp   5/31/2022 5/31/2022   PN   4/30/2022 4/30/2022   FTF   4/30/2022 4/30/2022   FOTO  2/5 1/5 0/5            bike 7' at L4   nustep  5' at L4  5' at L4     Mat/Seated:        LTR 2 min  2 min      Bridges   2 min       Hip flexor stretch 3 x 30" L   Wu quad   3 x 30" L      Piriformis stretch 3 x 20" L   3 x 20" L      HS stretch  3 x 20"  L  3 x 20" L      gastroc stretch   3 x 30" L     Ankle tband   4 directions 2 x 10 RTB L   2 x 10 RTB L     Ankle ABCs   1x L    BAPS (seated) 30x   (fwd/bwd; side to side; cw/ccw)  20x (fwd/bwd; side to side; cw/ccw)     Toe yoga    2 min      Towel crunch  2 min       Towel inv/ev        Ankle AROM        Plantar fascia rolls  3 min blue  Dowel  2' (spiked ball)     Seated hip flexion   3 x 10  " "  Standing:        Soleus stretch   3 x 30"      Calf stretch 3 x 30"   3 x 30"      Heel raise/toe raise 3 x 10  2 x 10       BAPS        SLS        Seen by EM EM EG EG             Home Exercises Provided and Patient Education Provided     Education provided:   - importance of consistent performance of HEP    Written Home Exercises Provided: yes.  Exercises were reviewed and Ace was able to demonstrate them prior to the end of the session.  Ace demonstrated good  understanding of the education provided.     Assessment     Ace presents to therapy with chief complaints of L hip and L heel pain. Patient stated her back was bothering her after picking things up over the holiday. Patient progressed through exercises being able to stretch the areas bothering her the most. Patient noted for weakness with ankle dorsiflexion. Patient ended session stating she felt better than when she arrived.     Ace Is progressing well towards her goals.   Pt prognosis is Good.     Pt will continue to benefit from skilled outpatient physical therapy to address the deficits listed in the problem list box on initial evaluation, provide pt/family education and to maximize pt's level of independence in the home and community environment.     Pt's spiritual, cultural and educational needs considered and pt agreeable to plan of care and goals.     Anticipated barriers to physical therapy: None    Goals:   Short Term Goals: 4 weeks   1. This patient will be independent with a basic HEP. In progress, not met  2. This patient will increase B LE strength by 1 grade in order to be able to perform prolonged standing for her job responsibilities with no difficulty. In progress, not met  3. This patient will have a pain rating of 7/10 at worst with ADLs. In progress, not met  4. Patient able to score greater than or equal to 50% on the FOTO Foot Survey indicating patient is 50% impaired, limited, or restricted and demonstrating " overall decreased foot pain with functional activities. In progress, not met     Long Term Goals 8 weeks   1. This patient will be independent with an updated HEP. In progress, not met  2. This patient will increase B LE strength to 4+/5 in order to be able to perform usual household duties and job responsibilities with no complaints of pain. In progress, not met  3. This patient will have a pain rating of 5/10 at worst with ADLs. In progress, not met  4. Patient able to score greater than or equal to 60% on the FOTO Foot Survey indicating patient is 40% impaired, limited, or restricted and demonstrating overall decreased foot pain with functional activities. In progress, not met    Plan     Continue with PT POC and progress as tolerated.     Agustin Ramirez, PTA

## 2022-04-26 ENCOUNTER — CLINICAL SUPPORT (OUTPATIENT)
Dept: REHABILITATION | Facility: HOSPITAL | Age: 32
End: 2022-04-26
Payer: MEDICAID

## 2022-04-26 ENCOUNTER — DOCUMENTATION ONLY (OUTPATIENT)
Dept: REHABILITATION | Facility: HOSPITAL | Age: 32
End: 2022-04-26
Payer: MEDICAID

## 2022-04-26 DIAGNOSIS — R29.898 WEAKNESS OF BOTH LOWER EXTREMITIES: Primary | ICD-10-CM

## 2022-04-26 DIAGNOSIS — M79.672 PAIN OF LEFT HEEL: ICD-10-CM

## 2022-04-26 DIAGNOSIS — M25.672 DECREASED RANGE OF MOTION OF LEFT ANKLE: ICD-10-CM

## 2022-04-26 PROCEDURE — 97110 THERAPEUTIC EXERCISES: CPT | Mod: PO

## 2022-04-26 NOTE — PROGRESS NOTES
PT/PTA met face to face to discuss pt's treatment plan and progress towards established goals. Pt will be seen by a physical therapist minimally every 6th visit or every 30 days.    Yeimy Turner PT    Face to Face PTA Conference performed with Yeimy Turner PT regarding patient's current status, overall progress, and plan of care. Pt will be seen by a physical therapist minimally every 6th visit or every 30 days.    Agustin Ramirez,CHUY  4/26/2022

## 2022-04-26 NOTE — PROGRESS NOTES
"  Physical Therapy Treatment Note     Name: Ace Sheffield  Clinic Number: [id number]    Therapy Diagnosis:   Encounter Diagnoses   Name Primary?    Weakness of both lower extremities Yes    Pain of left heel     Decreased range of motion of left ankle      Physician: Jayson Smith DPM    Visit Date: 4/26/2022    Physician Orders: PT Eval and Treat   Medical Diagnosis from Referral: M72.2 (ICD-10-CM) - Plantar fascial fibromatosis  Evaluation Date: 3/31/2022  Authorization Period Expiration: 12/31/2022  Plan of Care Expiration: 5/31/2022  Progress Note Due: 4/30/2022  Visit # / Visits authorized: 4/ 20   FOTO: 4/5    Time In: 11:15 am  Time Out: 11:59 am  Total Billable Time: 44 minutes    Precautions: Standard    Subjective     Pt reports: that she is not really having pain in her heel, but her low back is hurting today. Pt states that she was standing at work for about 8 hours, and the pain kicks in for her low back at about 4 hours of steadily standing. She states that she has been relaxing her feet more.   She was not compliant with home exercise program.  Response to previous treatment: none  Functional change: none    Pain: 0/10 (L heel); 5/10 (low back)  Location: L heel and ankle    Objective     Ace received therapeutic exercises to develop strength and ROM for 44 minutes including:     Date 4/26/2022 4/19/2022 4/13/2022 4/7/2022 3/31/2022   Visit 4/20 3/20 2/20 1/20 1/1   POC exp 5/31/2022 5/31/2022 5/31/2022   PN 5/30/2022 4/30/2022 4/30/2022   FTF 5/30/2022 4/30/2022 4/30/2022   FOTO 4/5 3/5 2/5 1/5 0/5             bike 7' at L4 NuStep 7' at L4   nustep  5' at L4  5' at L4     Mat/Seated:         LTR 2' 2 min  2 min      Glute sets 2' w 5" holds       Bridges  DC due to pain  2 min       Hip flexor stretch 3 x 30" L 3 x 30" L   Wu quad   3 x 30" L      Piriformis stretch 3 x 30" L 3 x 20" L   3 x 20" L      HS stretch  3 x 30" L 3 x 20"  L  3 x 20" L      gastroc stretch    3 x 30" L " "    Ankle tband   4 directions 2 x 10 RTB L 2 x 10 RTB L   2 x 10 RTB L     Ankle ABCs    1x L    BAPS (seated) 30x (fwd/bwd; side to side; cw/ccw) 30x   (fwd/bwd; side to side; cw/ccw)  20x (fwd/bwd; side to side; cw/ccw)     Toe yoga     2 min      Towel crunch   2 min       Towel inv/ev         Ankle AROM         Plantar fascia rolls   3 min blue  Dowel  2' (spiked ball)     Hip abd (seated) 3 x 10 RTB       Seated hip flexion    3 x 10    Standing:         Soleus stretch    3 x 30"      Calf stretch  3 x 30"   3 x 30"      Heel raise/toe raise  3 x 10  2 x 10       BAPS         SLS         Seen by EG EM EM EG EG         Home Exercises Provided and Patient Education Provided     Education provided:   - importance of consistent performance of HEP    Written Home Exercises Provided: yes.  Exercises were reviewed and Ace was able to demonstrate them prior to the end of the session.  Ace demonstrated good  understanding of the education provided.     Assessment     Ace presents to therapy with L low back/hip pain, without any pain in the L heel. She explains that she has purposefully been resting her feet more. She enters the clinic with tennis shoes with minimal foot support. PT explains that importance of proper footwear to support her feet while standing and walking for long periods of time to avoid foot, ankle, knee, and low back pain. Ace tolerates today's session well without pain provocation. She requires minimal VCs and TCs for completion of prescribed activities with appropriate body mechanics and for postural awareness.     Ace Is progressing well towards her goals.   Pt prognosis is Good.     Pt will continue to benefit from skilled outpatient physical therapy to address the deficits listed in the problem list box on initial evaluation, provide pt/family education and to maximize pt's level of independence in the home and community environment.     Pt's spiritual, cultural and " educational needs considered and pt agreeable to plan of care and goals.     Anticipated barriers to physical therapy: None    Goals:   Short Term Goals: 4 weeks   1. This patient will be independent with a basic HEP. In progress, not met  2. This patient will increase B LE strength by 1 grade in order to be able to perform prolonged standing for her job responsibilities with no difficulty. In progress, not met  3. This patient will have a pain rating of 7/10 at worst with ADLs. In progress, not met  4. Patient able to score greater than or equal to 50% on the FOTO Foot Survey indicating patient is 50% impaired, limited, or restricted and demonstrating overall decreased foot pain with functional activities. In progress, not met     Long Term Goals 8 weeks   1. This patient will be independent with an updated HEP. In progress, not met  2. This patient will increase B LE strength to 4+/5 in order to be able to perform usual household duties and job responsibilities with no complaints of pain. In progress, not met  3. This patient will have a pain rating of 5/10 at worst with ADLs. In progress, not met  4. Patient able to score greater than or equal to 60% on the FOTO Foot Survey indicating patient is 40% impaired, limited, or restricted and demonstrating overall decreased foot pain with functional activities. In progress, not met    Plan     Continue with PT POC and progress as tolerated. Re-add bridges.    Yeimy Turner, PT

## 2022-05-05 ENCOUNTER — CLINICAL SUPPORT (OUTPATIENT)
Dept: REHABILITATION | Facility: HOSPITAL | Age: 32
End: 2022-05-05
Payer: MEDICAID

## 2022-05-05 DIAGNOSIS — R29.898 WEAKNESS OF BOTH LOWER EXTREMITIES: Primary | ICD-10-CM

## 2022-05-05 DIAGNOSIS — M79.672 PAIN OF LEFT HEEL: ICD-10-CM

## 2022-05-05 DIAGNOSIS — M25.672 DECREASED RANGE OF MOTION OF LEFT ANKLE: ICD-10-CM

## 2022-05-05 PROCEDURE — 97110 THERAPEUTIC EXERCISES: CPT | Mod: PO

## 2022-05-05 NOTE — PROGRESS NOTES
"  Physical Therapy Treatment Note     Name: Ace Sheffield  Clinic Number: [id number]    Therapy Diagnosis:   Encounter Diagnoses   Name Primary?    Weakness of both lower extremities Yes    Pain of left heel     Decreased range of motion of left ankle      Physician: Jayson Smith, INDRA    Visit Date: 5/5/2022    Physician Orders: PT Eval and Treat   Medical Diagnosis from Referral: M72.2 (ICD-10-CM) - Plantar fascial fibromatosis  Evaluation Date: 3/31/2022  Authorization Period Expiration: 12/31/2022  Plan of Care Expiration: 5/31/2022  Progress Note Due: 4/30/2022  Visit # / Visits authorized: 4/ 20   FOTO: 4/5    Time In: 11:15 am  Time Out: 11:59 am  Total Billable Time: 44 minutes    Precautions: Standard    Subjective     Pt reports: I am having pain in my L heel and arch when I try to walk.   She was not compliant with home exercise program.  Response to previous treatment: felt very good  Functional change: none    Pain: 8/10 (L heel)  Location: L heel and ankle    Objective     Ace received therapeutic exercises to develop strength and ROM for 44 minutes including:     Date 5/5/2022 4/26/2022 4/19/2022 4/13/2022 4/7/2022 3/31/2022   Visit 5/20 4/20 3/20 2/20 1/20 1/1   POC exp 5/31/2022 5/31/2022 5/31/2022 5/31/2022   PN 5/30/2022 5/30/2022 4/30/2022 4/30/2022   FTF 5/30/2022 5/30/2022 4/30/2022 4/30/2022   FOTO 5/5 4/5 3/5 2/5 1/5 0/5              bike 7' at L5 NuStep 7' at L4 NuStep 7' at L4   nustep  5' at L4  5' at L4     Mat/Seated:          LTR 2' 2' 2 min  2 min      Glute sets  2' w 5" holds       Bridges  2' DC due to pain  2 min       Hip flexor stretch 3 x 30" L 3 x 30" L 3 x 30" L   Wu quad   3 x 30" L      Piriformis stretch 3 x 30" L 3 x 30" L 3 x 20" L   3 x 20" L      HS stretch  3 x 30" L 3 x 30" L 3 x 20"  L  3 x 20" L      gastroc stretch 3 x 30" L    3 x 30" L     Ankle tband   4 directions 2 x 10   GTB L 2 x 10 RTB L 2 x 10 RTB L   2 x 10 RTB L     Ankle ABCs     1x " "L    BAPS (seated)  30x (fwd/bwd; side to side; cw/ccw) 30x   (fwd/bwd; side to side; cw/ccw)  20x (fwd/bwd; side to side; cw/ccw)     Toe yoga      2 min      Towel crunch    2 min       Towel inv/ev          Ankle AROM          Plantar fascia rolls    3 min blue  Dowel  2' (spiked ball)     Hip abd (seated) 3 x 10 RTB (supine) 3 x 10 RTB       Seated hip flexion     3 x 10    Standing:          Soleus stretch     3 x 30"      Calf stretch   3 x 30"   3 x 30"      Heel raise/toe raise   3 x 10  2 x 10       BAPS          SLS          Seen by EG EG EM EM EG EG      Manual therapy for 8 minutes including:  - Myofascial release of L plantar fascia      Home Exercises Provided and Patient Education Provided     Education provided:   - importance of consistent performance of HEP    Written Home Exercises Provided: yes.  Exercises were reviewed and Ace was able to demonstrate them prior to the end of the session.  Ace demonstrated good  understanding of the education provided.     Assessment     Ace presents to therapy with moderate to maximal pain in her L heel and arch. She explains that she has not purchase new tennis shoes with increased foot support at this time. She is wearing tennis shoes with minimal foot support at this visit. At previous visits, PT has explained the importance of proper footwear. Ace tolerates today's session well without pain provocation. She is able to resume performance of bridges at this visit. She states that she feels better following MT. She requires minimal VCs and TCs for completion of prescribed activities with appropriate body mechanics and for postural awareness.     Ace Is progressing well towards her goals.   Pt prognosis is Good.     Pt will continue to benefit from skilled outpatient physical therapy to address the deficits listed in the problem list box on initial evaluation, provide pt/family education and to maximize pt's level of independence in the " home and community environment.     Pt's spiritual, cultural and educational needs considered and pt agreeable to plan of care and goals.     Anticipated barriers to physical therapy: None    Goals:   Short Term Goals: 4 weeks   1. This patient will be independent with a basic HEP. In progress, not met  2. This patient will increase B LE strength by 1 grade in order to be able to perform prolonged standing for her job responsibilities with no difficulty. In progress, not met  3. This patient will have a pain rating of 7/10 at worst with ADLs. In progress, not met  4. Patient able to score greater than or equal to 50% on the FOTO Foot Survey indicating patient is 50% impaired, limited, or restricted and demonstrating overall decreased foot pain with functional activities. In progress, not met     Long Term Goals 8 weeks   1. This patient will be independent with an updated HEP. In progress, not met  2. This patient will increase B LE strength to 4+/5 in order to be able to perform usual household duties and job responsibilities with no complaints of pain. In progress, not met  3. This patient will have a pain rating of 5/10 at worst with ADLs. In progress, not met  4. Patient able to score greater than or equal to 60% on the FOTO Foot Survey indicating patient is 40% impaired, limited, or restricted and demonstrating overall decreased foot pain with functional activities. In progress, not met    Plan     Continue with PT POC and progress as tolerated. Use GTB for hip abduction.    Yeimy Turner, PT

## 2022-05-11 ENCOUNTER — CLINICAL SUPPORT (OUTPATIENT)
Dept: REHABILITATION | Facility: HOSPITAL | Age: 32
End: 2022-05-11
Payer: MEDICAID

## 2022-05-11 DIAGNOSIS — R29.898 WEAKNESS OF BOTH LOWER EXTREMITIES: Primary | ICD-10-CM

## 2022-05-11 DIAGNOSIS — M25.672 DECREASED RANGE OF MOTION OF LEFT ANKLE: ICD-10-CM

## 2022-05-11 DIAGNOSIS — M79.672 PAIN OF LEFT HEEL: ICD-10-CM

## 2022-05-11 PROCEDURE — 97110 THERAPEUTIC EXERCISES: CPT | Mod: PO

## 2022-05-11 NOTE — PROGRESS NOTES
"  Physical Therapy Treatment Note     Name: Ace Sheffield  Clinic Number: [id number]    Therapy Diagnosis:   Encounter Diagnoses   Name Primary?    Weakness of both lower extremities Yes    Pain of left heel     Decreased range of motion of left ankle      Physician: Jayson Smith, INDRA    Visit Date: 5/11/2022    Physician Orders: PT Eval and Treat   Medical Diagnosis from Referral: M72.2 (ICD-10-CM) - Plantar fascial fibromatosis  Evaluation Date: 3/31/2022  Authorization Period Expiration: 12/31/2022  Plan of Care Expiration: 5/31/2022  Progress Note Due: 4/30/2022  Visit # / Visits authorized: 6/ 20   FOTO: 5/5    Time In: 4:01 pm  Time Out: 4:41 pm  Total Billable Time: 40 minutes    Precautions: Standard    Subjective     Pt reports: I am feeling alright.   She was not compliant with home exercise program.  Response to previous treatment: felt better  Functional change: none    Pain: 8/10 (L heel)  Location: L heel and ankle    Objective     Ace received therapeutic exercises to develop strength and ROM for 36 minutes including:     Date 5/11/2022 5/5/2022 4/26/2022 4/19/2022 4/13/2022 4/7/2022 3/31/2022   Visit 6/20 5/20 4/20 3/20 2/20 1/20 1/1   POC exp 5/31/2022 5/31/2022 5/31/2022 5/31/2022 5/31/2022   PN 5/30/2022 5/30/2022 5/30/2022 4/30/2022 4/30/2022   FTF 5/30/2022 5/30/2022 5/30/2022 4/30/2022 4/30/2022   FOTO  5/5 4/5 3/5 2/5 1/5 0/5               bike 6' at L6 NuStep 7' at L5 NuStep 7' at L4 NuStep 7' at L4   nustep  5' at L4  5' at L4     Mat/Seated:           LTR 2' 2' 2' 2 min  2 min      Glute sets   2' w 5" holds       Bridges  2' w 5" holds 2' DC due to pain  2 min       Hip flexor stretch  3 x 30" L 3 x 30" L 3 x 30" L   Wu quad   3 x 30" L      Piriformis stretch  3 x 30" L 3 x 30" L 3 x 20" L   3 x 20" L      HS stretch   3 x 30" L 3 x 30" L 3 x 20"  L  3 x 20" L      gastroc stretch 3 x 30" L 3 x 30" L    3 x 30" L     Ankle tband   4 directions 2 x 10 GTB L 2 x 10 " "  GTB L 2 x 10 RTB L 2 x 10 RTB L   2 x 10 RTB L     Ankle ABCs      1x L    BAPS (seated)   30x (fwd/bwd; side to side; cw/ccw) 30x   (fwd/bwd; side to side; cw/ccw)  20x (fwd/bwd; side to side; cw/ccw)     Toe yoga       2 min      Towel crunch     2 min       Towel inv/ev           Ankle AROM           Plantar fascia rolls     3 min blue  Dowel  2' (spiked ball)     Hip abd (seated)  3 x 10 RTB (supine) 3 x 10 RTB       Seated hip flexion      3 x 10    Standing:           Soleus stretch      3 x 30"      Hip abduction          Calf stretch 2 x 30" B   3 x 30"   3 x 30"      Heel raise/toe raise 25x ea  10x SL heel raises   3 x 10  2 x 10       BAPS           SLS           Seen by EG EG EG EM EM EG EG      Manual therapy for 4 minutes including: *billed as therex  - Myofascial release of L plantar fascia      Home Exercises Provided and Patient Education Provided     Education provided:   - importance of consistent performance of HEP    Written Home Exercises Provided: yes.  Exercises were reviewed and Ace was able to demonstrate them prior to the end of the session.  Ace demonstrated good  understanding of the education provided.     Assessment     Ace presents to therapy with maximal levels of pain in the same area, her L heel and arch. She again states she feels much better following brief manual therapy to her plantar fascia. PT recommends that pt perform plantar fascia rolls, which are added to her HEP. She explains that she has to go to work immediately following therapy, and PT again recommends more supportive footwear. She tolerates today's session well without symptom provocation, stating she feels a little better when leaving visit. She requires minimal VCs and TCs for completion of prescribed activities with appropriate body mechanics and for postural awareness.     Ace Is progressing well towards her goals.   Pt prognosis is Good.     Pt will continue to benefit from skilled " outpatient physical therapy to address the deficits listed in the problem list box on initial evaluation, provide pt/family education and to maximize pt's level of independence in the home and community environment.     Pt's spiritual, cultural and educational needs considered and pt agreeable to plan of care and goals.     Anticipated barriers to physical therapy: None    Goals:   Short Term Goals: 4 weeks   1. This patient will be independent with a basic HEP. In progress, not met  2. This patient will increase B LE strength by 1 grade in order to be able to perform prolonged standing for her job responsibilities with no difficulty. In progress, not met  3. This patient will have a pain rating of 7/10 at worst with ADLs. In progress, not met  4. Patient able to score greater than or equal to 50% on the FOTO Foot Survey indicating patient is 50% impaired, limited, or restricted and demonstrating overall decreased foot pain with functional activities. In progress, not met     Long Term Goals 8 weeks   1. This patient will be independent with an updated HEP. In progress, not met  2. This patient will increase B LE strength to 4+/5 in order to be able to perform usual household duties and job responsibilities with no complaints of pain. In progress, not met  3. This patient will have a pain rating of 5/10 at worst with ADLs. In progress, not met  4. Patient able to score greater than or equal to 60% on the FOTO Foot Survey indicating patient is 40% impaired, limited, or restricted and demonstrating overall decreased foot pain with functional activities. In progress, not met    Plan     Continue with PT POC and progress as tolerated. Print additional HEP for patient.    Yeimy Turner, PT

## 2022-05-12 ENCOUNTER — CLINICAL SUPPORT (OUTPATIENT)
Dept: REHABILITATION | Facility: HOSPITAL | Age: 32
End: 2022-05-12
Payer: MEDICAID

## 2022-05-12 DIAGNOSIS — M25.672 DECREASED RANGE OF MOTION OF LEFT ANKLE: ICD-10-CM

## 2022-05-12 DIAGNOSIS — M79.672 PAIN OF LEFT HEEL: ICD-10-CM

## 2022-05-12 DIAGNOSIS — R29.898 WEAKNESS OF BOTH LOWER EXTREMITIES: Primary | ICD-10-CM

## 2022-05-12 PROCEDURE — 97110 THERAPEUTIC EXERCISES: CPT | Mod: PO

## 2022-05-12 NOTE — PROGRESS NOTES
"  Physical Therapy Treatment Note     Name: Ace Sheffield  Clinic Number: [id number]    Therapy Diagnosis:   Encounter Diagnoses   Name Primary?    Weakness of both lower extremities Yes    Pain of left heel     Decreased range of motion of left ankle      Physician: Jayson Smith, INDRA    Visit Date: 5/12/2022    Physician Orders: PT Eval and Treat   Medical Diagnosis from Referral: M72.2 (ICD-10-CM) - Plantar fascial fibromatosis  Evaluation Date: 3/31/2022  Authorization Period Expiration: 12/31/2022  Plan of Care Expiration: 5/31/2022  Progress Note Due: 4/30/2022  Visit # / Visits authorized: 7/ 20   FOTO: 5/5    Time In: 1:45 pm  Time Out: 2:29 pm  Total Billable Time: 44 minutes    Precautions: Standard    Subjective     Pt reports: I am feeling good. I did not have to go to work yesterday and I do not have to go to work today.  She was not compliant with home exercise program.  Response to previous treatment: good  Functional change: none    Pain: 6/10 (L heel)  Location: L heel and ankle    Objective     Ace received therapeutic exercises to develop strength and ROM for 44 minutes including:     Date 5/12/2022 5/11/2022 5/5/2022 4/26/2022 4/19/2022 4/13/2022 4/7/2022 3/31/2022   Visit 7/20 6/20 5/20 4/20 3/20 2/20 1/20 1/1   POC exp 5/31/2022 5/31/2022 5/31/2022 5/31/2022 5/31/2022 5/31/2022   PN 5/30/2022 5/30/2022 5/30/2022 5/30/2022 4/30/2022 4/30/2022   FTF 5/30/2022 5/30/2022 5/30/2022 5/30/2022 4/30/2022 4/30/2022   FOTO   5/5 4/5 3/5 2/5 1/5 0/5                bike 6' at L5  bike 6' at L6 NuStep 7' at L5 NuStep 7' at L4 NuStep 7' at L4   nustep  5' at L4  5' at L4     Mat/Seated:            LTR 2' 2' 2' 2' 2 min  2 min      Glute sets    2' w 5" holds       Bridges  2' w 5" holds 2' w 5" holds 2' DC due to pain  2 min       Hip flexor stretch 2 x 30" B  3 x 30" L 3 x 30" L 3 x 30" L   Wu quad   3 x 30" L      Piriformis stretch   3 x 30" L 3 x 30" L 3 x 20" L   3 x 20" L      HS " "stretch  3 x 30" L  3 x 30" L 3 x 30" L 3 x 20"  L  3 x 20" L      gastroc stretch 3 x 30" L 3 x 30" L 3 x 30" L    3 x 30" L     Ankle tband   4 directions 2 x 10 GTB L 2 x 10 GTB L 2 x 10   GTB L 2 x 10 RTB L 2 x 10 RTB L   2 x 10 RTB L     Ankle ABCs       1x L    BAPS (seated)    30x (fwd/bwd; side to side; cw/ccw) 30x   (fwd/bwd; side to side; cw/ccw)  20x (fwd/bwd; side to side; cw/ccw)     Toe yoga        2 min      Towel crunch      2 min       Towel inv/ev            Ankle AROM            Plantar fascia rolls 2' w red spiky ball     3 min blue  Dowel  2' (spiked ball)     Hip abd (seated) 3 x 10 RTB (seated)  3 x 10 RTB (supine) 3 x 10 RTB       Seated hip flexion       3 x 10    Standing:            Soleus stretch       3 x 30"      Hip abduction           Calf stretch 3 x 30" B 2 x 30" B   3 x 30"   3 x 30"      Heel raise/toe raise 20x B  10x SL heel raises 25x ea  10x SL heel raises   3 x 10  2 x 10       BAPS            SLS            Seen by EG EG EG EG EM EM EG EG      Special Tests:  Wu test: R - RF; L - RF    Manual therapy for 0 minutes including: *billed as therex  - Myofascial release of L plantar fascia      Home Exercises Provided and Patient Education Provided     Education provided:   - importance of consistent performance of HEP    Written Home Exercises Provided: yes.  Exercises were reviewed and Ace was able to demonstrate them prior to the end of the session.  Ace demonstrated good  understanding of the education provided.     Assessment     Ace presents to therapy reporting moderate levels of pain in her L heel, however it should be noted that she has not had to be at work for the past couple of days, and standing for work for prolonged periods of time provokes pain for her. Ace states that she is having some discomfort in her L arch with HS stretch. She does the plantar fascia roll at this visit, and this activity is provided as part of her HEP. She tolerates " today's session well without symptom provocation, stating she feels a little better when leaving visit. She requires minimal VCs and TCs for completion of prescribed activities with appropriate body mechanics and for postural awareness.     Ace Is progressing well towards her goals.   Pt prognosis is Good.     Pt will continue to benefit from skilled outpatient physical therapy to address the deficits listed in the problem list box on initial evaluation, provide pt/family education and to maximize pt's level of independence in the home and community environment.     Pt's spiritual, cultural and educational needs considered and pt agreeable to plan of care and goals.     Anticipated barriers to physical therapy: None    Goals:   Short Term Goals: 4 weeks   1. This patient will be independent with a basic HEP. In progress, not met  2. This patient will increase B LE strength by 1 grade in order to be able to perform prolonged standing for her job responsibilities with no difficulty. In progress, not met  3. This patient will have a pain rating of 7/10 at worst with ADLs. In progress, not met  4. Patient able to score greater than or equal to 50% on the FOTO Foot Survey indicating patient is 50% impaired, limited, or restricted and demonstrating overall decreased foot pain with functional activities. In progress, not met     Long Term Goals 8 weeks   1. This patient will be independent with an updated HEP. In progress, not met  2. This patient will increase B LE strength to 4+/5 in order to be able to perform usual household duties and job responsibilities with no complaints of pain. In progress, not met  3. This patient will have a pain rating of 5/10 at worst with ADLs. In progress, not met  4. Patient able to score greater than or equal to 60% on the FOTO Foot Survey indicating patient is 40% impaired, limited, or restricted and demonstrating overall decreased foot pain with functional activities. In progress,  not met    Plan     Continue with PT POC and progress as tolerated. Try BTB for active ankle 4-way.    Yeimy Turner, PT

## 2022-05-17 ENCOUNTER — CLINICAL SUPPORT (OUTPATIENT)
Dept: REHABILITATION | Facility: HOSPITAL | Age: 32
End: 2022-05-17
Payer: MEDICAID

## 2022-05-17 DIAGNOSIS — M79.672 PAIN OF LEFT HEEL: ICD-10-CM

## 2022-05-17 DIAGNOSIS — R29.898 WEAKNESS OF BOTH LOWER EXTREMITIES: Primary | ICD-10-CM

## 2022-05-17 DIAGNOSIS — M25.672 DECREASED RANGE OF MOTION OF LEFT ANKLE: ICD-10-CM

## 2022-05-17 PROCEDURE — 97110 THERAPEUTIC EXERCISES: CPT | Mod: PO

## 2022-05-17 NOTE — PROGRESS NOTES
"  Physical Therapy Treatment Note     Name: Ace Sheffield  Clinic Number: [id number]    Therapy Diagnosis:   Encounter Diagnoses   Name Primary?    Weakness of both lower extremities Yes    Pain of left heel     Decreased range of motion of left ankle      Physician: Jayson Smith DPM    Visit Date: 5/17/2022    Physician Orders: PT Eval and Treat   Medical Diagnosis from Referral: M72.2 (ICD-10-CM) - Plantar fascial fibromatosis  Evaluation Date: 3/31/2022  Authorization Period Expiration: 12/31/2022  Plan of Care Expiration: 5/31/2022  Progress Note Due: 4/30/2022  Visit # / Visits authorized: 8/ 20   FOTO: 5/5    Time In: 1:15 pm  Time Out: 1:55 pm  Total Billable Time: 40 minutes    Precautions: Standard    Subjective     Pt reports: I am feeling good again today, and I did not work this weekend. I do not go back to work until Friday. I have not bought any new tennis shoes for work.      She was not compliant with home exercise program.  Response to previous treatment: good  Functional change: none    Pain: 5/10 (L heel)  Location: L heel and ankle    Objective     Ace received therapeutic exercises to develop strength and ROM for 40 minutes including:     Date 5/17/2022 5/12/2022 5/11/2022 5/5/2022 4/26/2022 4/19/2022 4/13/2022 4/7/2022 3/31/2022   Visit 8/20 7/20 6/20 5/20 4/20 3/20 2/20 1/20 1/1   POC exp 5/31/2022 5/31/2022 5/31/2022 5/31/2022 5/31/2022 5/31/2022 5/31/2022   PN 5/30/2022 5/30/2022 5/30/2022 5/30/2022 5/30/2022 4/30/2022 4/30/2022   FTF 5/30/2022 5/30/2022 5/30/2022 5/30/2022 5/30/2022 4/30/2022 4/30/2022   FOTO    5/5 4/5 3/5 2/5 1/5 0/5                 bike 7' at L5  bike 6' at L5  bike 6' at L6 NuStep 7' at L5 NuStep 7' at L4 NuStep 7' at L4   nustep  5' at L4  5' at L4     Mat/Seated:             LTR 2' 2' 2' 2' 2' 2 min  2 min      Glute sets     2' w 5" holds       Bridges  2' w 5" holds 2' w 5" holds 2' w 5" holds 2' DC due to pain  2 min       Hip flexor stretch 2 x " "30" B 2 x 30" B  3 x 30" L 3 x 30" L 3 x 30" L   Wu quad   3 x 30" L      Piriformis stretch    3 x 30" L 3 x 30" L 3 x 20" L   3 x 20" L      HS stretch  3 x 30" L 3 x 30" L  3 x 30" L 3 x 30" L 3 x 20"  L  3 x 20" L      gastroc stretch 3 x 30" L 3 x 30" L 3 x 30" L 3 x 30" L    3 x 30" L     Ankle tband   4 directions 2 x 10   BTB L 2 x 10 GTB L 2 x 10 GTB L 2 x 10   GTB L 2 x 10 RTB L 2 x 10 RTB L   2 x 10 RTB L     Ankle ABCs        1x L    BAPS (seated)     30x (fwd/bwd; side to side; cw/ccw) 30x   (fwd/bwd; side to side; cw/ccw)  20x (fwd/bwd; side to side; cw/ccw)     Toe yoga         2 min      Towel crunch       2 min       Towel inv/ev             Ankle AROM             Plantar fascia rolls 3' w red spiky ball 2' w red spiky ball     3 min blue  Dowel  2' (spiked ball)     Hip abd (seated) 3 x 10   GTB  (seated) 3 x 10 RTB (seated)  3 x 10 RTB (supine) 3 x 10 RTB       Seated hip flexion        3 x 10    Shuttle press 2 x 10 (squats)  2 x 10   (heel raises)           Standing:             Squats            Soleus stretch        3 x 30"      Hip abduction            Calf stretch 3 x 30" L 3 x 30" B 2 x 30" B   3 x 30"   3 x 30"      Heel raise/toe raise  20x B  10x SL heel raises 25x ea  10x SL heel raises   3 x 10  2 x 10       BAPS             SLS             Seen by EG EG EG EG EG EM EM EG EG      Special Tests:  Wu test: R - RF; L - RF    Manual therapy for 0 minutes including: *billed as therex  - Myofascial release of L plantar fascia      Home Exercises Provided and Patient Education Provided     Education provided:   - importance of consistent performance of HEP    Written Home Exercises Provided: yes.  Exercises were reviewed and Ace was able to demonstrate them prior to the end of the session.  Ace demonstrated good  understanding of the education provided.     Assessment     Ace presents to therapy reporting further decreased levels of pain in her L heel, however she has " not been back to work for the entire previous weekend. PT has recommended footwear with better foot and ankle support, however, pt states that she has not purchased new tennis shoes at this time. She tolerates today's session well without symptom provocation along with multiple progressions. She states that she is feeling better when leaving this visit and that she is feeling some muscle soreness. PT explains that soreness should not last more than a couple of hours and that it should not be sharp. She requires minimal VCs and TCs for completion of prescribed activities with appropriate body mechanics and for postural awareness.     Ace Is progressing well towards her goals.   Pt prognosis is Good.     Pt will continue to benefit from skilled outpatient physical therapy to address the deficits listed in the problem list box on initial evaluation, provide pt/family education and to maximize pt's level of independence in the home and community environment.     Pt's spiritual, cultural and educational needs considered and pt agreeable to plan of care and goals.     Anticipated barriers to physical therapy: None    Goals:   Short Term Goals: 4 weeks   1. This patient will be independent with a basic HEP. In progress, not met  2. This patient will increase B LE strength by 1 grade in order to be able to perform prolonged standing for her job responsibilities with no difficulty. In progress, not met  3. This patient will have a pain rating of 7/10 at worst with ADLs. In progress, not met  4. Patient able to score greater than or equal to 50% on the FOTO Foot Survey indicating patient is 50% impaired, limited, or restricted and demonstrating overall decreased foot pain with functional activities. In progress, not met     Long Term Goals 8 weeks   1. This patient will be independent with an updated HEP. In progress, not met  2. This patient will increase B LE strength to 4+/5 in order to be able to perform usual  household duties and job responsibilities with no complaints of pain. In progress, not met  3. This patient will have a pain rating of 5/10 at worst with ADLs. In progress, not met  4. Patient able to score greater than or equal to 60% on the FOTO Foot Survey indicating patient is 40% impaired, limited, or restricted and demonstrating overall decreased foot pain with functional activities. In progress, not met    Plan     Continue with PT POC and progress as tolerated. Increase standing activities.    Yeimy Turner, PT

## 2022-05-19 ENCOUNTER — CLINICAL SUPPORT (OUTPATIENT)
Dept: REHABILITATION | Facility: HOSPITAL | Age: 32
End: 2022-05-19
Payer: MEDICAID

## 2022-05-19 DIAGNOSIS — M25.672 DECREASED RANGE OF MOTION OF LEFT ANKLE: ICD-10-CM

## 2022-05-19 DIAGNOSIS — M79.672 PAIN OF LEFT HEEL: ICD-10-CM

## 2022-05-19 DIAGNOSIS — R29.898 WEAKNESS OF BOTH LOWER EXTREMITIES: Primary | ICD-10-CM

## 2022-05-19 PROCEDURE — 97110 THERAPEUTIC EXERCISES: CPT | Mod: PO

## 2022-05-19 NOTE — PROGRESS NOTES
"  Physical Therapy Treatment Note     Name: Ace Sheffield  Clinic Number: [id number]    Therapy Diagnosis:   Encounter Diagnoses   Name Primary?    Weakness of both lower extremities Yes    Pain of left heel     Decreased range of motion of left ankle      Physician: Jayson Smith, INDRA    Visit Date: 5/19/2022    Physician Orders: PT Eval and Treat   Medical Diagnosis from Referral: M72.2 (ICD-10-CM) - Plantar fascial fibromatosis  Evaluation Date: 3/31/2022  Authorization Period Expiration: 12/31/2022  Plan of Care Expiration: 5/31/2022  Progress Note Due: 4/30/2022  Visit # / Visits authorized: 9/ 20   FOTO: 5/5    Time In: 1:15 pm  Time Out: 2:00 pm  Total Billable Time: 45 minutes    Precautions: Standard    Subjective     Pt reports: I am feeling really good again. I go back to work on Friday.      She was not compliant with home exercise program.  Response to previous treatment: okay  Functional change: none    Pain: 1/10 (L heel)  Location: L heel and ankle    Objective     Ace received therapeutic exercises to develop strength and ROM for 45 minutes including:     Date 5/19/2022 5/17/2022 5/12/2022 5/11/2022 5/5/2022 4/26/2022 4/19/2022 4/13/2022 4/7/2022 3/31/2022   Visit 9/20 8/20 7/20 6/20 5/20 4/20 3/20 2/20 1/20 1/1   POC exp 5/31/2022 5/31/2022 5/31/2022 5/31/2022 5/31/2022 5/31/2022 5/31/2022 5/31/2022   PN 5/30/2022 5/30/2022 5/30/2022 5/30/2022 5/30/2022 5/30/2022 4/30/2022 4/30/2022   FTF 5/30/2022 5/30/2022 5/30/2022 5/30/2022 5/30/2022 5/30/2022 4/30/2022 4/30/2022   FOTO     5/5 4/5 3/5 2/5 1/5 0/5                  bike 7' at L5   NuStep 7' at L5  bike 6' at L5  bike 6' at L6 NuStep 7' at L5 NuStep 7' at L4 NuStep 7' at L4   nustep  5' at L4  5' at L4     Mat/Seated:              LTR 2' 2' 2' 2' 2' 2' 2 min  2 min      Glute sets      2' w 5" holds       Bridges  2' w 5" holds 2' w 5" holds 2' w 5" holds 2' w 5" holds 2' DC due to pain  2 min       Hip flexor stretch 2 x 30" B 2 " "x 30" B 2 x 30" B  3 x 30" L 3 x 30" L 3 x 30" L   Wu quad   3 x 30" L      Piriformis stretch     3 x 30" L 3 x 30" L 3 x 20" L   3 x 20" L      HS stretch  3 x 30" L 3 x 30" L 3 x 30" L  3 x 30" L 3 x 30" L 3 x 20"  L  3 x 20" L      gastroc stretch 3 x 30" L 3 x 30" L 3 x 30" L 3 x 30" L 3 x 30" L    3 x 30" L     Ankle tband   4 directions 3 x 10 BTB L 2 x 10   BTB L 2 x 10 GTB L 2 x 10 GTB L 2 x 10   GTB L 2 x 10 RTB L 2 x 10 RTB L   2 x 10 RTB L     Ankle ABCs         1x L    BAPS (seated)      30x (fwd/bwd; side to side; cw/ccw) 30x   (fwd/bwd; side to side; cw/ccw)  20x (fwd/bwd; side to side; cw/ccw)     Toe yoga          2 min      Towel crunch        2 min       Towel inv/ev              Ankle AROM              Plantar fascia rolls 3' w red spiky ball 3' w red spiky ball 2' w red spiky ball     3 min blue  Dowel  2' (spiked ball)     Hip abd (seated) 3 x 10   GTB  (seated) 3 x 10   GTB  (seated) 3 x 10 RTB (seated)  3 x 10 RTB (supine) 3 x 10 RTB       Seated hip flexion         3 x 10    Shuttle press 50#  2 x 10 (squats and heel raises) 2 x 10 (squats)  2 x 10   (heel raises)           Standing:              Squats             Soleus stretch         3 x 30"      Hip abduction             Calf stretch 3 x 30" L 3 x 30" L 3 x 30" B 2 x 30" B   3 x 30"   3 x 30"      Heel raise/toe raise 25x ea  2 x 10x SL  (heel raises)  20x B  10x SL heel raises 25x ea  10x SL heel raises   3 x 10  2 x 10       BAPS              SLS              Seen by EG EG EG EG EG EG EM EM EG EG      Special Tests:  Wu test: R - RF; L - RF    Manual therapy for 0 minutes including: *billed as therex  - Myofascial release of L plantar fascia      Home Exercises Provided and Patient Education Provided     Education provided:   - importance of consistent performance of HEP    Written Home Exercises Provided: yes.  Exercises were reviewed and Ace was able to demonstrate them prior to the end of the session.  Ace " demonstrated good  understanding of the education provided.     Assessment     Ace presents to therapy reporting minimal pain. PT again recommends improved footwear with better foot and ankle support. She tolerates today's session well without symptom provocation along with multiple progressions. She requires minimal to no VCs and TCs for completion of prescribed activities with appropriate body mechanics and for postural awareness.     Ace Is progressing well towards her goals.   Pt prognosis is Good.     Pt will continue to benefit from skilled outpatient physical therapy to address the deficits listed in the problem list box on initial evaluation, provide pt/family education and to maximize pt's level of independence in the home and community environment.     Pt's spiritual, cultural and educational needs considered and pt agreeable to plan of care and goals.     Anticipated barriers to physical therapy: None    Goals:   Short Term Goals: 4 weeks   1. This patient will be independent with a basic HEP. In progress, not met  2. This patient will increase B LE strength by 1 grade in order to be able to perform prolonged standing for her job responsibilities with no difficulty. In progress, not met  3. This patient will have a pain rating of 7/10 at worst with ADLs. In progress, not met  4. Patient able to score greater than or equal to 50% on the FOTO Foot Survey indicating patient is 50% impaired, limited, or restricted and demonstrating overall decreased foot pain with functional activities. In progress, not met     Long Term Goals 8 weeks   1. This patient will be independent with an updated HEP. In progress, not met  2. This patient will increase B LE strength to 4+/5 in order to be able to perform usual household duties and job responsibilities with no complaints of pain. In progress, not met  3. This patient will have a pain rating of 5/10 at worst with ADLs. In progress, not met  4. Patient able to  "score greater than or equal to 60% on the FOTO Foot Survey indicating patient is 40% impaired, limited, or restricted and demonstrating overall decreased foot pain with functional activities. In progress, not met    Plan   Discharge at next visit. Retake measurements and have pt complete discharge FOTO.    Continue with PT POC and progress as tolerated. Increase standing activities. Increase bridges with 10" holds.    Yeimy Turner, PT     "

## 2022-05-23 ENCOUNTER — CLINICAL SUPPORT (OUTPATIENT)
Dept: REHABILITATION | Facility: HOSPITAL | Age: 32
End: 2022-05-23
Payer: MEDICAID

## 2022-05-23 DIAGNOSIS — R29.898 WEAKNESS OF BOTH LOWER EXTREMITIES: Primary | ICD-10-CM

## 2022-05-23 DIAGNOSIS — M25.672 DECREASED RANGE OF MOTION OF LEFT ANKLE: ICD-10-CM

## 2022-05-23 DIAGNOSIS — M79.672 PAIN OF LEFT HEEL: ICD-10-CM

## 2022-05-23 PROCEDURE — 97164 PT RE-EVAL EST PLAN CARE: CPT | Mod: PO

## 2022-05-23 NOTE — PROGRESS NOTES
Physical Therapy Treatment Note     Name: Ace Sheffield  Clinic Number: [id number]    Therapy Diagnosis:   Encounter Diagnoses   Name Primary?    Weakness of both lower extremities Yes    Pain of left heel     Decreased range of motion of left ankle      Physician: Jayson Smith DPM    Visit Date: 5/23/2022    Physician Orders: PT Eval and Treat   Medical Diagnosis from Referral: M72.2 (ICD-10-CM) - Plantar fascial fibromatosis  Evaluation Date: 3/31/2022  Authorization Period Expiration: 12/31/2022  Plan of Care Expiration: 5/31/2022  Progress Note Due: 4/30/2022  Visit # / Visits authorized: 9/ 20   FOTO: 5/5    Time In: 2:30 pm  Time Out: 2:50 pm  Total Billable Time: 20 minutes    Precautions: Standard    Subjective     Pt reports: I went to work yesterday and I think that caused me to have more pain.      She was not compliant with home exercise program.  Response to previous treatment: okay  Functional change: none    Pain: 5/10 (L heel)  Location: L heel and ankle    Objective     Re-evaluation performed today for 20 minutes for discharge:    Observation: Pt is 30 yo F presenting to therapy in no apparent distress.     Gait: Pt does not display significant gait abnormalities     Range of Motion: AROM (PROM):     Ankle Left Right   Dorsiflexion 10 (15) degrees 10 (15) degrees   Plantarflexion 50 (60) degrees 60 (66) degrees   Inversion 25 (35) degrees 28 (36) degrees   Eversion 30 (45) degrees 45 (50) degrees   Subtalar Inv. NT NT   Subtalar Ever. NT NT         Strength:  Hip Left Right   Flexion 4/5 4-/5   Abduction 5/5 4/5   Adduction 4/5 4/5   Extension 4+/5 4+/5   External Rot NT NT   Internal Rot NT NT      Knee Left Right   Extension 4+/5 4+/5   Flexion 5/5 5/5      Ankle Left Right   Dorsiflexion 5/5 5/5   Plantarflexion 5/5 5/5   Inversion 5/5 4/5   Eversion 5/5 4/5         Joint Mobility: No hypomobility noted; Some pain provocation with lateral subtalar mobilization     Palpation: TTP around  "L heel and lateral ankle peroneal tendon     Sensation: Grossly intact      Ace received therapeutic exercises to develop strength and ROM for 0 minutes including:     Date 5/19/2022 5/17/2022 5/12/2022 5/11/2022 5/5/2022 4/26/2022 4/19/2022 4/13/2022 4/7/2022 3/31/2022   Visit 9/20 8/20 7/20 6/20 5/20 4/20 3/20 2/20 1/20 1/1   POC exp 5/31/2022 5/31/2022 5/31/2022 5/31/2022 5/31/2022 5/31/2022 5/31/2022 5/31/2022   PN 5/30/2022 5/30/2022 5/30/2022 5/30/2022 5/30/2022 5/30/2022 4/30/2022 4/30/2022   FTF 5/30/2022 5/30/2022 5/30/2022 5/30/2022 5/30/2022 5/30/2022 4/30/2022 4/30/2022   FOTO     5/5 4/5 3/5 2/5 1/5 0/5                  bike 7' at L5   NuStep 7' at L5  bike 6' at L5  bike 6' at L6 NuStep 7' at L5 NuStep 7' at L4 NuStep 7' at L4   nustep  5' at L4  5' at L4     Mat/Seated:              LTR 2' 2' 2' 2' 2' 2' 2 min  2 min      Glute sets      2' w 5" holds       Bridges  2' w 5" holds 2' w 5" holds 2' w 5" holds 2' w 5" holds 2' DC due to pain  2 min       Hip flexor stretch 2 x 30" B 2 x 30" B 2 x 30" B  3 x 30" L 3 x 30" L 3 x 30" L   Wu quad   3 x 30" L      Piriformis stretch     3 x 30" L 3 x 30" L 3 x 20" L   3 x 20" L      HS stretch  3 x 30" L 3 x 30" L 3 x 30" L  3 x 30" L 3 x 30" L 3 x 20"  L  3 x 20" L      gastroc stretch 3 x 30" L 3 x 30" L 3 x 30" L 3 x 30" L 3 x 30" L    3 x 30" L     Ankle tband   4 directions 3 x 10 BTB L 2 x 10   BTB L 2 x 10 GTB L 2 x 10 GTB L 2 x 10   GTB L 2 x 10 RTB L 2 x 10 RTB L   2 x 10 RTB L     Ankle ABCs         1x L    BAPS (seated)      30x (fwd/bwd; side to side; cw/ccw) 30x   (fwd/bwd; side to side; cw/ccw)  20x (fwd/bwd; side to side; cw/ccw)     Toe yoga          2 min      Towel crunch        2 min       Towel inv/ev              Ankle AROM              Plantar fascia rolls 3' w red spiky ball 3' w red spiky ball 2' w red spiky ball     3 min blue  Dowel  2' (spiked ball)     Hip abd (seated) 3 x 10   GTB  (seated) 3 x 10   GTB  (seated) 3 " "x 10 RTB (seated)  3 x 10 RTB (supine) 3 x 10 RTB       Seated hip flexion         3 x 10    Shuttle press 50#  2 x 10 (squats and heel raises) 2 x 10 (squats)  2 x 10   (heel raises)           Standing:              Squats             Soleus stretch         3 x 30"      Hip abduction             Calf stretch 3 x 30" L 3 x 30" L 3 x 30" B 2 x 30" B   3 x 30"   3 x 30"      Heel raise/toe raise 25x ea  2 x 10x SL  (heel raises)  20x B  10x SL heel raises 25x ea  10x SL heel raises   3 x 10  2 x 10       BAPS              SLS              Seen by EG EG EG EG EG EG EM EM EG EG      Special Tests:  Wu test: R - RF; L - RF    Manual therapy for 0 minutes including: *billed as therex  - Myofascial release of L plantar fascia      Home Exercises Provided and Patient Education Provided     Education provided:   - importance of consistent performance of HEP    Written Home Exercises Provided: yes.  Exercises were reviewed and Ace was able to demonstrate them prior to the end of the session.  Ace demonstrated good  understanding of the education provided.     Assessment     Ace presents to therapy for discharge at this visit. She reports increased pain in her L heel at this visit after going to work yesterday. PT explains to Ace the importance of doing plantar fascia rolls and wearing stability shoes for increased foot support with over pronation. She is provided with a copy of prescribed activities as well as a blue TB for maintenance of therapy activities. Ace explains that she would not like to perform any activities today.      Ace Is progressing well towards her goals.   Pt prognosis is Good.     Pt will continue to benefit from skilled outpatient physical therapy to address the deficits listed in the problem list box on initial evaluation, provide pt/family education and to maximize pt's level of independence in the home and community environment.     Pt's spiritual, cultural and " educational needs considered and pt agreeable to plan of care and goals.     Anticipated barriers to physical therapy: None    Goals:   Short Term Goals: 4 weeks   1. This patient will be independent with a basic HEP. Met  2. This patient will increase B LE strength by 1 grade in order to be able to perform prolonged standing for her job responsibilities with no difficulty. Partially met  3. This patient will have a pain rating of 7/10 at worst with ADLs. Met  4. Patient able to score greater than or equal to 50% on the FOTO Foot Survey indicating patient is 50% impaired, limited, or restricted and demonstrating overall decreased foot pain with functional activities. Not met     Long Term Goals 8 weeks   1. This patient will be independent with an updated HEP. Met  2. This patient will increase B LE strength to 4+/5 in order to be able to perform usual household duties and job responsibilities with no complaints of pain. Partially met  3. This patient will have a pain rating of 5/10 at worst with ADLs. Not met  4. Patient able to score greater than or equal to 60% on the FOTO Foot Survey indicating patient is 40% impaired, limited, or restricted and demonstrating overall decreased foot pain with functional activities. Not met    Plan   Discharge at this visit.       Yeimy Turner, PT

## 2022-05-23 NOTE — PLAN OF CARE
[facility]  PT Discharge Note    Name: Ace Sheffield  Clinic Number: [id number]    Therapy Diagnosis:   Encounter Diagnoses   Name Primary?    Weakness of both lower extremities Yes    Pain of left heel     Decreased range of motion of left ankle      Physician: Jyason Smith DPM    Physician Orders: PT Eval and Treat   Medical Diagnosis from Referral: M72.2 (ICD-10-CM) - Plantar fascial fibromatosis  Evaluation Date: 3/31/2022      Date of Last visit: 5/23/2022  Total Visits Received: 10    ASSESSMENT      Ace presents to therapy for discharge at this visit. She reports increased pain in her L heel at this visit after going to work yesterday. PT explains to Ace the importance of doing plantar fascia rolls and wearing stability shoes for increased foot support with over pronation. She is provided with a copy of prescribed activities as well as a blue TB for maintenance of therapy activities. Ace explains that she would not like to perform any activities today.     Discharge reason: Patient has reached the maximum rehab potential for the present time    Goals:     Short Term Goals: 4 weeks   1. This patient will be independent with a basic HEP. Met  2. This patient will increase B LE strength by 1 grade in order to be able to perform prolonged standing for her job responsibilities with no difficulty. Partially met  3. This patient will have a pain rating of 7/10 at worst with ADLs. Met  4. Patient able to score greater than or equal to 50% on the FOTO Foot Survey indicating patient is 50% impaired, limited, or restricted and demonstrating overall decreased foot pain with functional activities. Not met     Long Term Goals 8 weeks   1. This patient will be independent with an updated HEP. Met  2. This patient will increase B LE strength to 4+/5 in order to be able to perform usual household duties and job responsibilities with no complaints of pain. Partially met  3.  This patient will have a pain rating of 5/10 at worst with ADLs. Not met  4. Patient able to score greater than or equal to 60% on the FOTO Foot Survey indicating patient is 40% impaired, limited, or restricted and demonstrating overall decreased foot pain with functional activities. Not met      PLAN   This patient is discharged from Physical Therapy      Yeimy Turner, PT

## 2022-05-24 ENCOUNTER — TELEPHONE (OUTPATIENT)
Dept: NEUROLOGY | Facility: CLINIC | Age: 32
End: 2022-05-24

## 2022-05-24 NOTE — TELEPHONE ENCOUNTER
----- Message from Ivanna De La Cruz sent at 5/24/2022  8:29 AM CDT -----  Regarding: pr  Pt is calling to speak with the nurse to reschedule her appt on 5/27 can you please call pt at [phone].    NÉSTOR

## 2022-07-22 ENCOUNTER — PROCEDURE VISIT (OUTPATIENT)
Dept: NEUROLOGY | Facility: CLINIC | Age: 32
End: 2022-07-22
Payer: MEDICAID

## 2022-07-22 DIAGNOSIS — R20.2 PARESTHESIA OF SKIN: ICD-10-CM

## 2022-07-22 PROCEDURE — 95886 MUSC TEST DONE W/N TEST COMP: CPT | Mod: PBBFAC | Performed by: PSYCHIATRY & NEUROLOGY

## 2022-07-22 PROCEDURE — 95910 NRV CNDJ TEST 7-8 STUDIES: CPT | Mod: PBBFAC | Performed by: PSYCHIATRY & NEUROLOGY

## 2022-07-22 PROCEDURE — 95910 PR NERVE CONDUCTION STUDY; 7-8 STUDIES: ICD-10-PCS | Mod: 26,S$PBB,, | Performed by: PSYCHIATRY & NEUROLOGY

## 2022-07-22 PROCEDURE — 95886 PR EMG COMPLETE, W/ NERVE CONDUCTION STUDIES, 5+ MUSCLES: ICD-10-PCS | Mod: 26,S$PBB,, | Performed by: PSYCHIATRY & NEUROLOGY

## 2022-07-22 PROCEDURE — 95910 NRV CNDJ TEST 7-8 STUDIES: CPT | Mod: 26,S$PBB,, | Performed by: PSYCHIATRY & NEUROLOGY

## 2022-07-22 PROCEDURE — 95886 MUSC TEST DONE W/N TEST COMP: CPT | Mod: 26,S$PBB,, | Performed by: PSYCHIATRY & NEUROLOGY

## 2022-08-16 ENCOUNTER — LAB VISIT (OUTPATIENT)
Dept: LAB | Facility: HOSPITAL | Age: 32
End: 2022-08-16
Attending: PSYCHIATRY & NEUROLOGY
Payer: MEDICAID

## 2022-08-16 DIAGNOSIS — F20.0 PARANOID SCHIZOPHRENIA: Primary | ICD-10-CM

## 2022-08-16 DIAGNOSIS — Z79.899 ENCOUNTER FOR LONG-TERM (CURRENT) USE OF OTHER MEDICATIONS: ICD-10-CM

## 2022-08-16 LAB
ALBUMIN SERPL BCP-MCNC: 3.7 G/DL (ref 3.5–5.2)
ALP SERPL-CCNC: 81 U/L (ref 38–126)
ALT SERPL W/O P-5'-P-CCNC: 24 U/L (ref 10–44)
ANION GAP SERPL CALC-SCNC: 4 MMOL/L (ref 8–16)
AST SERPL-CCNC: 22 U/L (ref 15–46)
BASOPHILS # BLD AUTO: 0.03 K/UL (ref 0–0.2)
BASOPHILS NFR BLD: 0.4 % (ref 0–1.9)
BILIRUB SERPL-MCNC: 0.4 MG/DL (ref 0.1–1)
CALCIUM SERPL-MCNC: 8.6 MG/DL (ref 8.7–10.5)
CHLORIDE SERPL-SCNC: 106 MMOL/L (ref 95–110)
CHOLEST SERPL-MCNC: 189 MG/DL (ref 120–199)
CHOLEST/HDLC SERPL: 4.5 {RATIO} (ref 2–5)
CO2 SERPL-SCNC: 25 MMOL/L (ref 23–29)
CREAT SERPL-MCNC: 0.78 MG/DL (ref 0.5–1.4)
DIFFERENTIAL METHOD: ABNORMAL
EOSINOPHIL # BLD AUTO: 0 K/UL (ref 0–0.5)
EOSINOPHIL NFR BLD: 0.5 % (ref 0–8)
ERYTHROCYTE [DISTWIDTH] IN BLOOD BY AUTOMATED COUNT: 16.5 % (ref 11.5–14.5)
EST. GFR  (NO RACE VARIABLE): >60 ML/MIN/1.73 M^2
GLUCOSE SERPL-MCNC: 94 MG/DL (ref 70–110)
HCT VFR BLD AUTO: 36.6 % (ref 37–48.5)
HDLC SERPL-MCNC: 42 MG/DL (ref 40–75)
HDLC SERPL: 22.2 % (ref 20–50)
HGB BLD-MCNC: 11.4 G/DL (ref 12–16)
IMM GRANULOCYTES # BLD AUTO: 0.01 K/UL (ref 0–0.04)
IMM GRANULOCYTES NFR BLD AUTO: 0.1 % (ref 0–0.5)
LDLC SERPL CALC-MCNC: 119 MG/DL (ref 63–159)
LYMPHOCYTES # BLD AUTO: 3.6 K/UL (ref 1–4.8)
LYMPHOCYTES NFR BLD: 47.3 % (ref 18–48)
MCH RBC QN AUTO: 25.4 PG (ref 27–31)
MCHC RBC AUTO-ENTMCNC: 31.1 G/DL (ref 32–36)
MCV RBC AUTO: 82 FL (ref 82–98)
MONOCYTES # BLD AUTO: 0.6 K/UL (ref 0.3–1)
MONOCYTES NFR BLD: 8 % (ref 4–15)
NEUTROPHILS # BLD AUTO: 3.3 K/UL (ref 1.8–7.7)
NEUTROPHILS NFR BLD: 43.7 % (ref 38–73)
NONHDLC SERPL-MCNC: 147 MG/DL
NRBC BLD-RTO: 0 /100 WBC
PLATELET # BLD AUTO: 361 K/UL (ref 150–450)
PMV BLD AUTO: 10.5 FL (ref 9.2–12.9)
POTASSIUM SERPL-SCNC: 4.4 MMOL/L (ref 3.5–5.1)
PROT SERPL-MCNC: 6.9 G/DL (ref 6–8.4)
RBC # BLD AUTO: 4.49 M/UL (ref 4–5.4)
SODIUM SERPL-SCNC: 135 MMOL/L (ref 136–145)
TRIGL SERPL-MCNC: 140 MG/DL (ref 30–150)
UUN UR-MCNC: 14 MG/DL (ref 7–17)
WBC # BLD AUTO: 7.52 K/UL (ref 3.9–12.7)

## 2022-08-16 PROCEDURE — 85025 COMPLETE CBC W/AUTO DIFF WBC: CPT | Mod: PO | Performed by: PSYCHIATRY & NEUROLOGY

## 2022-08-16 PROCEDURE — 80053 COMPREHEN METABOLIC PANEL: CPT | Mod: PO | Performed by: PSYCHIATRY & NEUROLOGY

## 2022-08-16 PROCEDURE — 36415 COLL VENOUS BLD VENIPUNCTURE: CPT | Mod: PO | Performed by: PSYCHIATRY & NEUROLOGY

## 2022-08-16 PROCEDURE — 80061 LIPID PANEL: CPT | Performed by: PSYCHIATRY & NEUROLOGY

## 2022-11-12 ENCOUNTER — HOSPITAL ENCOUNTER (EMERGENCY)
Facility: HOSPITAL | Age: 32
Discharge: HOME | End: 2022-11-12
Attending: EMERGENCY MEDICINE
Payer: MEDICAID

## 2022-11-12 VITALS
TEMPERATURE: 98 F | DIASTOLIC BLOOD PRESSURE: 78 MMHG | HEIGHT: 64 IN | HEART RATE: 91 BPM | OXYGEN SATURATION: 100 % | BODY MASS INDEX: 50.02 KG/M2 | SYSTOLIC BLOOD PRESSURE: 132 MMHG | RESPIRATION RATE: 19 BRPM | WEIGHT: 293 LBS

## 2022-11-12 DIAGNOSIS — H18.892 CORNEAL IRRITATION OF LEFT EYE: Primary | ICD-10-CM

## 2022-11-12 LAB — B-HCG UR QL: NEGATIVE

## 2022-11-12 PROCEDURE — 81025 URINE PREGNANCY TEST: CPT | Mod: ER | Performed by: EMERGENCY MEDICINE

## 2022-11-12 PROCEDURE — 99284 EMERGENCY DEPT VISIT MOD MDM: CPT | Mod: ER

## 2022-11-12 PROCEDURE — 96372 THER/PROPH/DIAG INJ SC/IM: CPT | Performed by: EMERGENCY MEDICINE

## 2022-11-12 PROCEDURE — 25000003 PHARM REV CODE 250: Mod: ER | Performed by: PHYSICIAN ASSISTANT

## 2022-11-12 PROCEDURE — 63600175 PHARM REV CODE 636 W HCPCS: Mod: ER | Performed by: EMERGENCY MEDICINE

## 2022-11-12 PROCEDURE — 25000003 PHARM REV CODE 250: Mod: ER | Performed by: EMERGENCY MEDICINE

## 2022-11-12 RX ORDER — ERYTHROMYCIN 5 MG/G
OINTMENT OPHTHALMIC
Status: COMPLETED | OUTPATIENT
Start: 2022-11-12 | End: 2022-11-12

## 2022-11-12 RX ORDER — PROPARACAINE HYDROCHLORIDE 5 MG/ML
1 SOLUTION/ DROPS OPHTHALMIC
Status: COMPLETED | OUTPATIENT
Start: 2022-11-12 | End: 2022-11-12

## 2022-11-12 RX ORDER — KETOROLAC TROMETHAMINE 30 MG/ML
15 INJECTION, SOLUTION INTRAMUSCULAR; INTRAVENOUS
Status: COMPLETED | OUTPATIENT
Start: 2022-11-12 | End: 2022-11-12

## 2022-11-12 RX ORDER — DICLOFENAC SODIUM 75 MG/1
75 TABLET, DELAYED RELEASE ORAL 2 TIMES DAILY
Qty: 60 TABLET | Refills: 0 | Status: SHIPPED | OUTPATIENT
Start: 2022-11-12 | End: 2022-12-15

## 2022-11-12 RX ADMIN — ERYTHROMYCIN 1 INCH: 5 OINTMENT OPHTHALMIC at 06:11

## 2022-11-12 RX ADMIN — FLUORESCEIN SODIUM 1 EACH: 1 STRIP OPHTHALMIC at 06:11

## 2022-11-12 RX ADMIN — KETOROLAC TROMETHAMINE 15 MG: 30 INJECTION, SOLUTION INTRAMUSCULAR; INTRAVENOUS at 06:11

## 2022-11-12 RX ADMIN — PROPARACAINE HYDROCHLORIDE 1 DROP: 5 SOLUTION/ DROPS OPHTHALMIC at 06:11

## 2022-11-13 NOTE — ED PROVIDER NOTES
Encounter Date: 11/12/2022       History     Chief Complaint   Patient presents with    Eye Problem     Pt reports left eye pain while putting in contacts this morning, reports tearing. No meds, used warm compress without relief      HPI  32 y.o.    Co OS pain after putting contacts in this AM, felt irritated, thinks she may have scratched herself; wore contacts today through her 8 hr shift then took them off  Co photophobia, spasm    Review of patient's allergies indicates:  No Known Allergies  Past Medical History:   Diagnosis Date    Allergy      Past Surgical History:   Procedure Laterality Date    APPENDECTOMY  2009     Family History   Problem Relation Age of Onset    Breast cancer Neg Hx     Colon cancer Neg Hx     Ovarian cancer Neg Hx      Social History     Tobacco Use    Smoking status: Every Day     Packs/day: 0.25     Types: Cigarettes, Cigars    Smokeless tobacco: Never   Substance Use Topics    Alcohol use: Not Currently     Alcohol/week: 14.0 standard drinks     Types: 14 Cans of beer per week    Drug use: No     Review of Systems  All systems were reviewed/examined and were negative except as noted in the HPI.    Physical Exam     Initial Vitals [11/12/22 1746]   BP Pulse Resp Temp SpO2   130/77 90 20 97.9 °F (36.6 °C) 98 %      MAP       --         Physical Exam    General: the patient is awake, alert, and in no apparent distress.  Head: normocephalic and atraumatic, OS: blepharospasm, tearing; PERRL; no FB noted on lid eversion     EOMI, no large ulceration noted  Good relief w/ topical anesthetic  No sig fluro uptake  Neck: supple without meningismus  Chest:  no respiratory distress  Heart: regular rate and rhythm  Extremities: warm and well perfused  Skin: warm and dry  Psych conversant  Neuro: awake, alert, moving all extremities    ED Course   Procedures  Labs Reviewed   PREGNANCY TEST, URINE RAPID    Narrative:     Specimen Source->Urine          Imaging Results    None          Medications    proparacaine 0.5 % ophthalmic solution 1 drop (1 drop Right Eye Given 11/12/22 1811)   fluorescein ophthalmic strip 1 each (1 each Right Eye Given 11/12/22 1811)   ketorolac injection 15 mg (15 mg Intramuscular Given 11/12/22 1853)   erythromycin 5 mg/gram (0.5 %) ophthalmic ointment (1 inch Left Eye Given 11/12/22 1853)                    Medical Decision Making:    This is an emergent evaluation of a patient presenting to the ED.  Nursing notes were reviewed.    Hcg neg    I decided to obtain and review old medical records, which showed: well care    Evaluation for Emergency Medical Condition  The patient received a medical screening exam and within a reasonable degree of clinical confidence an emergency medical condition has not been identified.  The patient is instructed on proper follow up and return precautions to the ED.      Improved  Ref opth      Rd Guillen MD, VIOLETA              Clinical Impression:   Final diagnoses:  [H18.892] Corneal irritation of left eye (Primary)      ED Disposition Condition    Discharge Stable          ED Prescriptions       Medication Sig Dispense Start Date End Date Auth. Provider    diclofenac (VOLTAREN) 75 MG EC tablet Take 1 tablet (75 mg total) by mouth 2 (two) times daily. 60 tablet 11/12/2022 -- Ebenezer Guillen MD          Follow-up Information       Follow up With Specialties Details Why Contact Info Additional Information    [facility] - [address] [place] [address] Ophthalmology Schedule an appointment as soon as possible for a visit   [address]  [facility] [id number] [phone] Please arrive on the 10th floor for check-in.          Discharged to home in stable condition, return to ED warnings given, follow up and patient care instructions given.      Rd Guillen MD, VIOLETA, FACEP  Department of Emergency Medicine       Ebenezer Guillen MD  11/14/22 0017

## 2022-12-05 ENCOUNTER — CLINICAL SUPPORT (OUTPATIENT)
Dept: REHABILITATION | Facility: HOSPITAL | Age: 32
End: 2022-12-05
Payer: MEDICAID

## 2022-12-05 DIAGNOSIS — M54.6 CHRONIC LEFT-SIDED THORACIC BACK PAIN: ICD-10-CM

## 2022-12-05 DIAGNOSIS — G89.29 CHRONIC LEFT-SIDED THORACIC BACK PAIN: ICD-10-CM

## 2022-12-05 DIAGNOSIS — M25.512 CHRONIC LEFT SHOULDER PAIN: ICD-10-CM

## 2022-12-05 DIAGNOSIS — R53.1 WEAKNESS GENERALIZED: Primary | ICD-10-CM

## 2022-12-05 DIAGNOSIS — G89.29 CHRONIC LEFT SHOULDER PAIN: ICD-10-CM

## 2022-12-05 PROCEDURE — 97161 PT EVAL LOW COMPLEX 20 MIN: CPT | Mod: PO

## 2022-12-05 PROCEDURE — 97110 THERAPEUTIC EXERCISES: CPT | Mod: PO

## 2022-12-05 NOTE — PLAN OF CARE
[facility]  Physical Therapy Initial Evaluation    Name: Ace Sheffield  Clinic Number: [id number]    Therapy Diagnosis:   Encounter Diagnoses   Name Primary?    Chronic left-sided thoracic back pain     Weakness generalized Yes    Chronic left shoulder pain      Physician: Jayson Smith DPM    Physician Orders: PT Eval and Treat  Medical Diagnosis from Referral: M54.17 (ICD-10-CM) - Radiculopathy, lumbosacral region  Evaluation Date: 12/5/2022  Authorization Period Expiration: 12/31/2022  Plan of Care Expiration: 3/1/2023  Visit # / Visits authorized: 1/ 1    Time In: 0801  Time Out: 0900  Total Billable Time: 55 minutes    Precautions: Standard    Subjective     Date of onset: 2 months ago  History of current condition - Ace reports: insidious onset of back pain located in the left mid-thoracic area as well as anterior left shoulder pain that travels down the upper arm. Patient states these symptoms occur together and typically at work. Patient works at a [employer], having to be on her feet and lift heavy pieces of meat frequently throughout the day. She denies n/t, prior injury.  Pain is described as sharp.        Imaging        Prior Therapy: yes for plantar fascitis left foot  Exercise Routine/Sport Participation: none  Social History: live with grandparents   Occupation: works in a [employer], 3-4 days per week, frequently lifting heavy meats, standing a lot to make sandwiches  Prior Level of Function: IADLs  Current Level of Function: IADLs    Pain:  Current 9/10, worst 9/10, best 5/10   Location: left thoracic, left anterior shoulder  Description: Sharp, intermittent,   Aggravating Factors: pushing  Easing Factors: pain medication    Pts goals: relief from pain      Medical History:   Past Medical History:   Diagnosis Date    Allergy        Surgical History:   Ace Sheffield  has a past surgical history that includes Appendectomy (2009).    Medications:   Ace has a current  medication list which includes the following prescription(s): abilify maintena, diclofenac, diphenhydramine, doxycycline, fluticasone propionate, ketoconazole, minocycline, norelgestromin-ethinyl estradiol, risperidone, spironolactone, and trazodone.    Allergies:   Review of patient's allergies indicates:  No Known Allergies     Objective     Observation: no acute distress    Posture: forward head rounded shoulders       Active Range of Motion:   Shoulder Right Left   Flexion 180 180*   Abduction 170 170   ER at 0 90 90   IR at 0 80 80   Reach behind head NT NT   Reach behind back  NT NT     Strength:  Shoulder Right Left   Flexion 5/5 4/5   Abduction 5/5 4/5   ER 5/5 4/5   IR 5/5 4/5   Serratus Anterior NT NT   Middle Trap NT NT   Low Trap NT NT       Special Tests:   Right Left   Empty Can Test - -   Drop Arm test - -   Subscaputlaris Lift Off NT NT   O'carmelita's test NT NT   Hawkin's Kenndy - -   Neer's Test - -   Anterior Apprehension test - -   Sulcus Sign - -   ULTT  - -       Joint Mobility: normal glenohumeral joint mobility     Palpation: Tenderness to palpation over pec minor insertion, left mid-thoracic paraspinal     Sensation: no deficits         Lumbo-Thoracic Range of Motion:    Limitations Pain   Flexion 0   Anterior shoulder        Extension 0           Left Side Bending 0         Right Side Bending 0         Left rotation 0    Right rotation 0             CMS Impairment/Limitation/Restriction for FOTO Back Survey    Therapist reviewed FOTO scores for Ace Sheffield on 12/5/2022.   FOTO documents entered into [employer] - see Media section.    Limitation Score: see media%  Category: Mobility       Treatment     Treatment Time In: 0835  Treatment Time Out: 0855  Total Treatment time separate from Evaluation: 20 minutes     Ace received the treatments listed below:      Therapeutic exercises to develop strength, endurance, ROM, flexibility, posture, and core stabilization for 20 minutes  including:  Prone T  Prone Y   Open books  Grey theraband scapular rows     Manual therapy techniques: Joint mobilizations, Manual traction, and Soft tissue Mobilization were applied to the: thoracic spine for 00 minutes, including:        Home Exercises and Patient Education Provided     Education provided:  - Prognosis, activity modification, goals for therapy, role of therapy for care, exercises/HEP    Written Home Exercises Provided: yes.  Exercises were reviewed and Ace was able to demonstrate them prior to the end of the session.   Pt received a written copy of exercises to perform at home. Ace demonstrated good  understanding of the education provided.     See EMR under patient instructions for exercises given.     Assessment     Ace is a 32 y.o. female referred to outpatient Physical Therapy with complaints of thoracic paraspinal and left shoulder pain. On exam she is tender to palpation and generally deconditioned. Patient symptoms at thoracic spine appear to be muscular in nature and contributing to shoulder pain. Patient's occupation is also demanding and leaves a high risk of reinjury Addressed patient's concerns and discussed methods of limiting reinjury and plan of care.       Pt will benefit from skilled outpatient Physical Therapy to address the deficits stated above and in the chart below, provide pt/family education, and to maximize pt's level of independence. Pt prognosis is Good.     Plan of care discussed with patient: Yes  Pt's spiritual, cultural and educational needs considered and patient is agreeable to the plan of care and goals as stated below:       Anticipated Barriers for therapy: none      Medical Necessity is demonstrated by the following  History  Co-morbidities and personal factors that may impact the plan of care Co-morbidities:   high BMI    Personal Factors:   lifestyle     low   Examination  Body Structures and Functions, activity limitations and participation  restrictions that may impact the plan of care Body Regions:   back    Body Systems:    gross symmetry  ROM  strength  gross coordinated movement  balance  gait  transfers  transitions  motor control  motor learning    Participation Restrictions:       Activity limitations:   Learning and applying knowledge  No deficit    General Tasks and Commands  No deficit    Communication  No deficit    Mobility  lifting and carrying objects    Self care  No deficit    Domestic Life  No deficit    Interactions/Relationships  No deficit    Life Areas  Recreational Activities to A with health and wellness     Community and Social Life  No deficit          low   Clinical Presentation stable and uncomplicated low   Decision Making/ Complexity Score: low       GOALS:   Short Term Goals:  4 weeks  1.Report decreased thoracic and shoulder pain  < / =  5/10  to increase tolerance for HEP/ADL  2. Increase ROM where limited in order to perform ADLs without difficulty.  3. Increase strength by 1/3 MMT grade in rotator cuff and periscapular muscles to increase tolerance for ADL and work activities.  4. Pt to tolerate HEP to improve ROM and independence with ADL's    Long Term Goals: 12 weeks  1.Report decreased thoracic and shoulder pain < / = 2/10  to increase tolerance for HEP/ADL  2.Patient goal: relief from pain  3.Increase strength to 4+/5 in  rotator cuff and periscapular muscles to increase tolerance for ADL and work activities.  4. Pt will report at >90% on FOTO  to demonstrate increase in LE function with every day tasks.      Plan   Plan of care Certification: 12/5/2022 to 3/1/2023.    Outpatient Physical Therapy 1-2 times weekly for 12 weeks to include the following interventions: Aquatic Therapy, Cervical/Lumbar Traction, Electrical Stimulation NMES, Gait Training, Manual Therapy, Moist Heat/ Ice, Neuromuscular Re-ed, Orthotic Management and Training, Patient Education, Self Care, Therapeutic Activities, and Therapeutic  Exercise.     Rosaura Cary, PT , DPT

## 2022-12-13 ENCOUNTER — CLINICAL SUPPORT (OUTPATIENT)
Dept: REHABILITATION | Facility: HOSPITAL | Age: 32
End: 2022-12-13
Payer: MEDICAID

## 2022-12-13 DIAGNOSIS — G89.29 CHRONIC LEFT SHOULDER PAIN: ICD-10-CM

## 2022-12-13 DIAGNOSIS — R53.1 WEAKNESS GENERALIZED: ICD-10-CM

## 2022-12-13 DIAGNOSIS — M25.512 CHRONIC LEFT SHOULDER PAIN: ICD-10-CM

## 2022-12-13 DIAGNOSIS — M54.6 CHRONIC LEFT-SIDED THORACIC BACK PAIN: Primary | ICD-10-CM

## 2022-12-13 DIAGNOSIS — G89.29 CHRONIC LEFT-SIDED THORACIC BACK PAIN: Primary | ICD-10-CM

## 2022-12-13 PROCEDURE — 97110 THERAPEUTIC EXERCISES: CPT | Mod: PO,CQ

## 2022-12-13 NOTE — PROGRESS NOTES
"[facility]   Physical Therapy Treatment Note     Name: Ace Sheffield  Clinic Number: [id number]    Therapy Diagnosis:   Encounter Diagnoses   Name Primary?    Chronic left-sided thoracic back pain Yes    Weakness generalized     Chronic left shoulder pain      Physician: Jayson Smith DPM    Visit Date: 12/13/2022    Physician: Jayson Smith DPM     Physician Orders: PT Eval and Treat  Medical Diagnosis from Referral: M54.17 (ICD-10-CM) - Radiculopathy, lumbosacral region  Evaluation Date: 12/5/2022  Authorization Period Expiration: 12/31/2022  Plan of Care Expiration: 3/1/2023  Visit # / Visits authorized: 1/ 1, 1/12    PTA Visit #: 1/5       Time In: 3:00 pm  Time Out: 3:53 pm  Total Billable Time: 53 minutes    SUBJECTIVE     Pt reports: having some pain down her L arm from applying pressure through it.    Response to previous treatment: Initial eval  Functional change: Ongoing.     Pain: not rated /10  Location: left arms     OBJECTIVE     Objective Measures updated at progress report unless specified.     Treatment     Ace received the treatments listed below:      therapeutic exercises to develop strength, endurance, ROM, flexibility, and posture for 53 minutes including:    Prone T  Prone Y   Open books x 20 B   Grey theraband scapular rows x 20   Green theraband extensions x 20  Supine horizontal abd. YTB 2 x 10  Supine serratus punches 2 x 10 1# dowel    Seated thoracic ext str with 1/2 foam 10 w 10" hold     UBE 4'/4'    manual therapy techniques:       Patient Education and Home Exercises     Home Exercises Provided and Patient Education Provided     Education provided:       Written Home Exercises Provided:     ASSESSMENT     Pt with no pain post treatment session.  Pt performed the above exercises with good tolerance requiring verbal as well as tactile cueing on proper scap setting technique during theraband exercises.  Will continue to monitor and progress pt " within her tolerance.     Ace Is progressing well towards her goals.   Pt prognosis is Good.     Pt will continue to benefit from skilled outpatient physical therapy to address the deficits listed in the problem list box on initial evaluation, provide pt/family education and to maximize pt's level of independence in the home and community environment.     Pt's spiritual, cultural and educational needs considered and pt agreeable to plan of care and goals.     Anticipated barriers to physical therapy: none.     Goals:     Short Term Goals:  4 weeks  1.Report decreased thoracic and shoulder pain  < / =  5/10  to increase tolerance for HEP/ADL  2. Increase ROM where limited in order to perform ADLs without difficulty.  3. Increase strength by 1/3 MMT grade in rotator cuff and periscapular muscles to increase tolerance for ADL and work activities.  4. Pt to tolerate HEP to improve ROM and independence with ADL's     Long Term Goals: 12 weeks  1.Report decreased thoracic and shoulder pain < / = 2/10  to increase tolerance for HEP/ADL  2.Patient goal: relief from pain  3.Increase strength to 4+/5 in  rotator cuff and periscapular muscles to increase tolerance for ADL and work activities.  4. Pt will report at >90% on FOTO  to demonstrate increase in LE function with every day tasks.      PLAN     Cont. PT POC.     Jp Nelson, PTA

## 2022-12-15 ENCOUNTER — OFFICE VISIT (OUTPATIENT)
Dept: FAMILY MEDICINE | Facility: HOSPITAL | Age: 32
End: 2022-12-15
Payer: MEDICAID

## 2022-12-15 VITALS
SYSTOLIC BLOOD PRESSURE: 140 MMHG | HEIGHT: 64 IN | BODY MASS INDEX: 50.02 KG/M2 | DIASTOLIC BLOOD PRESSURE: 75 MMHG | WEIGHT: 293 LBS | HEART RATE: 68 BPM

## 2022-12-15 DIAGNOSIS — E66.01 CLASS 3 SEVERE OBESITY WITH BODY MASS INDEX (BMI) OF 50.0 TO 59.9 IN ADULT, UNSPECIFIED OBESITY TYPE, UNSPECIFIED WHETHER SERIOUS COMORBIDITY PRESENT: Primary | ICD-10-CM

## 2022-12-15 DIAGNOSIS — Z11.3 SCREENING FOR STDS (SEXUALLY TRANSMITTED DISEASES): ICD-10-CM

## 2022-12-15 DIAGNOSIS — R03.0 ELEVATED BP WITHOUT DIAGNOSIS OF HYPERTENSION: ICD-10-CM

## 2022-12-15 PROBLEM — G89.29 CHRONIC LEFT-SIDED THORACIC BACK PAIN: Status: RESOLVED | Noted: 2022-12-05 | Resolved: 2022-12-15

## 2022-12-15 PROBLEM — R53.1 WEAKNESS GENERALIZED: Status: RESOLVED | Noted: 2022-12-05 | Resolved: 2022-12-15

## 2022-12-15 PROBLEM — R29.898 WEAKNESS OF BOTH LOWER EXTREMITIES: Status: RESOLVED | Noted: 2022-03-31 | Resolved: 2022-12-15

## 2022-12-15 PROBLEM — M25.672 DECREASED RANGE OF MOTION OF LEFT ANKLE: Status: RESOLVED | Noted: 2022-03-31 | Resolved: 2022-12-15

## 2022-12-15 PROBLEM — M54.6 CHRONIC LEFT-SIDED THORACIC BACK PAIN: Status: RESOLVED | Noted: 2022-12-05 | Resolved: 2022-12-15

## 2022-12-15 PROBLEM — M79.672 PAIN OF LEFT HEEL: Status: RESOLVED | Noted: 2022-03-31 | Resolved: 2022-12-15

## 2022-12-15 PROCEDURE — 99213 OFFICE O/P EST LOW 20 MIN: CPT

## 2022-12-15 NOTE — PROGRESS NOTES
History & Physical  [facility]      SUBJECTIVE:     History of Present Illness:  Patient is a 32 y.o. female with paranoid schizophrenia who presents to clinic to establish care.     Reports increased appetite, polyuria x few days. Reports weight gain but trying to lose weight. Denies any numbness or tingling in feet, vision changes, polydipsia. Grandma with DM.   Undergoing PT currently for left shoulder. Does that she had an injection (unsure if steroid) earlier this week.     Sees Dermatologist for acne - on Minocycline     Sees psychiatrist - On Latuda 20 mg and Abilify. Last hospitalized April 2018    Elevated BP - 140/75. Denies any dizziness, lightheadedness, chest pain, vision changes, SOB.     Last pap smear- May 2021, normal. May 2024  Immunizations: UTD with COVID, denies flu shot due to arm swelling  Last HgbA1C- never had one  Last STI testing- desires, will get today  Last lipid panel- 08/22, normal    Smoker- vapes w/ 5% nicotine, but if runs out does have cigs   EtOH use- denies  Drug use- denies use  Diet- burgers, salad, tea with sugar  Exercise- not much outside of work  Sexual history- male partner, last time in 2014  Social history - works at [employer]     Review of patient's allergies indicates:  No Known Allergies    Past Medical History:   Diagnosis Date    Allergy      Past Surgical History:   Procedure Laterality Date    APPENDECTOMY  2009     Family History   Problem Relation Age of Onset    Breast cancer Neg Hx     Colon cancer Neg Hx     Ovarian cancer Neg Hx      Social History     Tobacco Use    Smoking status: Every Day     Packs/day: 0.25     Types: Cigarettes, Cigars    Smokeless tobacco: Never   Substance Use Topics    Alcohol use: Not Currently     Alcohol/week: 14.0 standard drinks     Types: 14 Cans of beer per week    Drug use: No        OBJECTIVE:     Vital Signs (Most Recent)  Vitals:    12/15/22 0918   BP: (!) 140/75   Pulse: 68   Weight: (!) 141.2 kg (311 lb  "4.6 oz)   Height: 5' 4" (1.626 m)     BMI: 53.43    Physical Exam  Vitals reviewed.   Constitutional:       General: She is not in acute distress.     Appearance: She is obese. She is not toxic-appearing.   Cardiovascular:      Rate and Rhythm: Normal rate and regular rhythm.      Pulses: Normal pulses.      Heart sounds: Normal heart sounds.   Pulmonary:      Effort: Pulmonary effort is normal. No respiratory distress.   Musculoskeletal:         General: Normal range of motion.   Skin:     General: Skin is warm.   Neurological:      Mental Status: She is alert. Mental status is at baseline.   Psychiatric:         Mood and Affect: Mood normal.         Behavior: Behavior normal.         Thought Content: Thought content normal.           ASSESSMENT/PLAN:   Patient is a 32 y.o. female with paranoid schizophrenia who presents to clinic to establish care.     1. Class 3 severe obesity with body mass index (BMI) of 50.0 to 59.9 in adult, unspecified obesity type, unspecified whether serious comorbidity present  -Discussed importance of diet and exercise for weight loss  -Patient declined weight loss medications at this time as she reports being afraid of needles/injections  - Hemoglobin A1C of 5.4 reviewed - not diabetic  - TSH - normal    2. Screening for STDs (sexually transmitted diseases)  -Per patient's request  - HIV 1/2 Ag/Ab (4th Gen); Future  - Hepatitis C Antibody; Future  - C. trachomatis/N. gonorrhoeae by AMP DNA [facility]; Urine; Future  - RPR; Future    3. Elevated BP without diagnosis of hypertension  - /75. Reports has a blood pressure cuff  -Advised to bring BP log for next visit in 1 month. May need to start antihypertensive med at that time  - CBC and CMP labs today      Follow-up: 1 month for elevated BP    A total of 60 minutes was spent on patient care during this encounter which included chart review, examining the patient, formulating a treatment plan and documentation.     Medical decision " making straight forward and not complex during this visit.     Lyn Neville MD  [facility], PGY-1  12/15/2022

## 2022-12-19 ENCOUNTER — CLINICAL SUPPORT (OUTPATIENT)
Dept: REHABILITATION | Facility: HOSPITAL | Age: 32
End: 2022-12-19
Payer: MEDICAID

## 2022-12-19 DIAGNOSIS — M25.512 CHRONIC LEFT SHOULDER PAIN: Primary | ICD-10-CM

## 2022-12-19 DIAGNOSIS — G89.29 CHRONIC LEFT SHOULDER PAIN: Primary | ICD-10-CM

## 2022-12-19 PROCEDURE — 97110 THERAPEUTIC EXERCISES: CPT | Mod: PO

## 2022-12-19 NOTE — PROGRESS NOTES
"[facility]   Physical Therapy Treatment Note     Name: Ace Sheffield  Clinic Number: [id number]    Therapy Diagnosis:   Encounter Diagnosis   Name Primary?    Chronic left shoulder pain Yes     Physician: Jayson Smith DPM    Visit Date: 12/19/2022    Physician: Jayson Smith DPM     Physician Orders: PT Eval and Treat  Medical Diagnosis from Referral: M54.17 (ICD-10-CM) - Radiculopathy, lumbosacral region  Evaluation Date: 12/5/2022  Authorization Period Expiration: 12/31/2022  Plan of Care Expiration: 3/1/2023  Visit # / Visits authorized: 1/ 1, 2/12    PTA Visit #: 1/5       Time In: 3:25 pm  Time Out: 4:00 pm  Total Billable Time: 35 minutes    SUBJECTIVE     Pt reports: no pain in shoulder today. She was off of work. She states exercises are going well, getting easy.       Response to previous treatment: Initial eval  Functional change: Ongoing     Pain:  Current 0/10, worst 9/10, best 5/10   Location: left thoracic, left anterior shoulder  Description: Sharp, intermittent,   Aggravating Factors: pushing  Easing Factors: pain medication     Pts goals: relief from pain       OBJECTIVE     Objective Measures updated at progress report unless specified.     Treatment     Ace received the treatments listed below:      therapeutic exercises to develop strength, endurance, ROM, flexibility, and posture for 35 minutes including:    Shoulder re-assessment  Prone T 1# 2x10   Prone Y 1# 2x10   BTB ER/IR 2x10 B   Grey theraband scapular rows x15  Grey theraband single arm row x15 B     NP:  Open books x 20 B   Green theraband extensions x 20  Supine horizontal abd. YTB 2 x 10  Supine serratus punches 2 x 10 1# dowel    Seated thoracic ext str with 1/2 foam 10 w 10" hold     UBE 4'/4'    manual therapy techniques:       Patient Education and Home Exercises     Home Exercises Provided and Patient Education Provided     Education provided:       Written Home Exercises Provided: "     ASSESSMENT     Patient tolerated treatment well. Was asymptomatic throughout. We progressed strengthening with isotonic rotator cuff exercises and resisted periscapular exercises. Addressed HEP and progressed exercises.     Ace Is progressing well towards her goals.   Pt prognosis is Good.     Pt will continue to benefit from skilled outpatient physical therapy to address the deficits listed in the problem list box on initial evaluation, provide pt/family education and to maximize pt's level of independence in the home and community environment.     Pt's spiritual, cultural and educational needs considered and pt agreeable to plan of care and goals.     Anticipated barriers to physical therapy: none.     Goals:     Short Term Goals:  4 weeks  1.Report decreased thoracic and shoulder pain  < / =  5/10  to increase tolerance for HEP/ADL  2. Increase ROM where limited in order to perform ADLs without difficulty.  3. Increase strength by 1/3 MMT grade in rotator cuff and periscapular muscles to increase tolerance for ADL and work activities.  4. Pt to tolerate HEP to improve ROM and independence with ADL's     Long Term Goals: 12 weeks  1.Report decreased thoracic and shoulder pain < / = 2/10  to increase tolerance for HEP/ADL  2.Patient goal: relief from pain  3.Increase strength to 4+/5 in  rotator cuff and periscapular muscles to increase tolerance for ADL and work activities.  4. Pt will report at >90% on FOTO  to demonstrate increase in LE function with every day tasks.      PLAN     Cont. PT POC.     Rosaura Cary, PT

## 2022-12-21 ENCOUNTER — TELEPHONE (OUTPATIENT)
Dept: FAMILY MEDICINE | Facility: HOSPITAL | Age: 32
End: 2022-12-21
Payer: MEDICAID

## 2022-12-21 DIAGNOSIS — D64.9 ANEMIA, UNSPECIFIED TYPE: Primary | ICD-10-CM

## 2022-12-21 NOTE — TELEPHONE ENCOUNTER
Called patient to discuss her lab results. Discussed that the only abnormality was that she is borderline anemic, but this was seen 4 months ago as well. Likely secondary to iron deficiency vs heavy menstrual cycles. Discussed I will order iron studies to see if she needs to be on iron supplementation. Does not endorse symptoms of anemia at this time. Also discussed incorporating foods high in iron in her diet in the meantime.     Lyn Neville MD  [facility], PGY-1  12/21/2022

## 2022-12-28 ENCOUNTER — CLINICAL SUPPORT (OUTPATIENT)
Dept: REHABILITATION | Facility: HOSPITAL | Age: 32
End: 2022-12-28
Payer: MEDICAID

## 2022-12-28 DIAGNOSIS — G89.29 CHRONIC LEFT SHOULDER PAIN: Primary | ICD-10-CM

## 2022-12-28 DIAGNOSIS — M25.512 CHRONIC LEFT SHOULDER PAIN: Primary | ICD-10-CM

## 2022-12-28 PROCEDURE — 97110 THERAPEUTIC EXERCISES: CPT | Mod: PO

## 2022-12-28 NOTE — PROGRESS NOTES
"[facility]   Physical Therapy Treatment Note     Name: Ace Sheffield  Clinic Number: [id number]    Therapy Diagnosis:   Encounter Diagnosis   Name Primary?    Chronic left shoulder pain Yes     Physician: Jayson Smith DPM    Visit Date: 12/28/2022    Physician: Jayson Smith DPM     Physician Orders: PT Eval and Treat  Medical Diagnosis from Referral: M54.17 (ICD-10-CM) - Radiculopathy, lumbosacral region  Evaluation Date: 12/5/2022  Authorization Period Expiration: 12/31/2022  Plan of Care Expiration: 3/1/2023  Visit # / Visits authorized: 1/ 1, 3/12    PTA Visit #: 1/5       Time In: 3:02 pm  Time Out: 4:00 pm  Total Billable Time: 55 minutes    SUBJECTIVE     Pt reports: aching and stinging pain at the central thoracic spine and down to low back. This started a few days ago. Patient cannot recall any reason for sudden emergence of pain.     Last visit: no pain in shoulder today. She was off of work. She states exercises are going well, getting easy.       Response to previous treatment: Initial eval  Functional change: Ongoing     Pain:  Current 7/10, worst 9/10, best 5/10   Location: left thoracic, left anterior shoulder  Description: Sharp, intermittent,   Aggravating Factors: pushing  Easing Factors: pain medication      Pts goals: relief from pain       OBJECTIVE     Objective Measures updated at progress report unless specified.     Painful CPA T1-T7   Nonpainful UPAs     Treatment     Ace received the treatments listed below:      therapeutic exercises to develop strength, endurance, ROM, flexibility, and posture for 55 minutes including:    Shoulder re-assessment  Open books x 20 B  Child's pose 10x5"   Seated Swiss ball rollouts x 30  Grey theraband scapular rows 3x10   RTB PNF D1 Flexion 3x10 B   GTB No money's 3x10       NP:   Green theraband extensions x 20  Supine horizontal abd. YTB 2 x 10  Supine serratus punches 2 x 10 1# dowel    Seated thoracic ext str " "with 1/2 foam 10 w 10" hold   Prone T 1# 2x10   Prone Y 1# 2x10   BTB ER/IR 2x10 B   Grey theraband single arm row x15 B   UBE 4'/4'    manual therapy techniques:       Patient Education and Home Exercises     Home Exercises Provided and Patient Education Provided     Education provided:       Written Home Exercises Provided:     ASSESSMENT     Patient demonstrated improvement in thoracic spine pain at end of visit. Overall doing well. Shoulder pain was not an issue. Will continue to progress as tolerated for return to PLOF and limit risk of further injury.     Ace Is progressing well towards her goals.   Pt prognosis is Good.     Pt will continue to benefit from skilled outpatient physical therapy to address the deficits listed in the problem list box on initial evaluation, provide pt/family education and to maximize pt's level of independence in the home and community environment.     Pt's spiritual, cultural and educational needs considered and pt agreeable to plan of care and goals.     Anticipated barriers to physical therapy: none.     Goals:     Short Term Goals:  4 weeks  1.Report decreased thoracic and shoulder pain  < / =  5/10  to increase tolerance for HEP/ADL  2. Increase ROM where limited in order to perform ADLs without difficulty.  3. Increase strength by 1/3 MMT grade in rotator cuff and periscapular muscles to increase tolerance for ADL and work activities.  4. Pt to tolerate HEP to improve ROM and independence with ADL's     Long Term Goals: 12 weeks  1.Report decreased thoracic and shoulder pain < / = 2/10  to increase tolerance for HEP/ADL  2.Patient goal: relief from pain  3.Increase strength to 4+/5 in  rotator cuff and periscapular muscles to increase tolerance for ADL and work activities.  4. Pt will report at >90% on FOTO  to demonstrate increase in LE function with every day tasks.      PLAN     Cont. PT POC.     Rosaura Cary, PT        "

## 2023-01-01 ENCOUNTER — HOSPITAL ENCOUNTER (EMERGENCY)
Facility: HOSPITAL | Age: 33
Discharge: HOME | End: 2023-01-01
Attending: STUDENT IN AN ORGANIZED HEALTH CARE EDUCATION/TRAINING PROGRAM
Payer: MEDICAID

## 2023-01-01 VITALS
HEIGHT: 64 IN | OXYGEN SATURATION: 100 % | HEART RATE: 93 BPM | WEIGHT: 293 LBS | TEMPERATURE: 99 F | BODY MASS INDEX: 50.02 KG/M2 | DIASTOLIC BLOOD PRESSURE: 81 MMHG | SYSTOLIC BLOOD PRESSURE: 140 MMHG | RESPIRATION RATE: 19 BRPM

## 2023-01-01 DIAGNOSIS — U07.1 COVID-19: Primary | ICD-10-CM

## 2023-01-01 PROBLEM — M72.2 PLANTAR FASCIITIS: Status: ACTIVE | Noted: 2021-12-07

## 2023-01-01 LAB
B-HCG UR QL: NEGATIVE
CTP QC/QA: YES
GROUP A STREP, MOLECULAR: NEGATIVE
INFLUENZA A, MOLECULAR: NEGATIVE
INFLUENZA B, MOLECULAR: NEGATIVE
SARS-COV-2 RDRP RESP QL NAA+PROBE: POSITIVE
SPECIMEN SOURCE: NORMAL

## 2023-01-01 PROCEDURE — 87651 STREP A DNA AMP PROBE: CPT | Mod: ER | Performed by: EMERGENCY MEDICINE

## 2023-01-01 PROCEDURE — 81025 URINE PREGNANCY TEST: CPT | Mod: ER | Performed by: EMERGENCY MEDICINE

## 2023-01-01 PROCEDURE — U0002 COVID-19 LAB TEST NON-CDC: HCPCS | Mod: ER | Performed by: EMERGENCY MEDICINE

## 2023-01-01 PROCEDURE — 25000003 PHARM REV CODE 250: Mod: ER | Performed by: EMERGENCY MEDICINE

## 2023-01-01 PROCEDURE — 87502 INFLUENZA DNA AMP PROBE: CPT | Mod: ER | Performed by: EMERGENCY MEDICINE

## 2023-01-01 PROCEDURE — 99284 EMERGENCY DEPT VISIT MOD MDM: CPT | Mod: ER

## 2023-01-01 RX ORDER — ACETAMINOPHEN 325 MG/1
650 TABLET ORAL
Status: COMPLETED | OUTPATIENT
Start: 2023-01-01 | End: 2023-01-01

## 2023-01-01 RX ORDER — TRIAMCINOLONE ACETONIDE 55 UG/1
2 SPRAY, METERED NASAL DAILY
Qty: 6.7 ML | Refills: 0 | Status: SHIPPED | OUTPATIENT
Start: 2023-01-01 | End: 2023-01-31

## 2023-01-01 RX ORDER — ONDANSETRON 4 MG/1
4 TABLET, ORALLY DISINTEGRATING ORAL EVERY 6 HOURS PRN
Qty: 30 TABLET | Refills: 0 | Status: SHIPPED | OUTPATIENT
Start: 2023-01-01

## 2023-01-01 RX ORDER — CETIRIZINE HYDROCHLORIDE 10 MG/1
10 TABLET ORAL DAILY
Qty: 30 TABLET | Refills: 0 | Status: SHIPPED | OUTPATIENT
Start: 2023-01-01 | End: 2023-11-05

## 2023-01-01 RX ORDER — NAPROXEN 500 MG/1
500 TABLET ORAL 2 TIMES DAILY WITH MEALS
Qty: 14 TABLET | Refills: 0 | Status: SHIPPED | OUTPATIENT
Start: 2023-01-01 | End: 2023-01-31

## 2023-01-01 RX ADMIN — ACETAMINOPHEN 650 MG: 325 TABLET ORAL at 05:01

## 2023-01-01 NOTE — Clinical Note
"Ace "Kd Sheffield was seen and treated in our emergency department on 1/1/2023.     COVID-19 is present in our communities across the state. There is limited testing for COVID at this time, so not all patients can be tested. In this situation, your employee meets the following criteria:    Ace Sheffield has met the criteria for COVID-19 testing and has a POSITIVE result. She can return to work once they are asymptomatic for 24 hours without the use of fever reducing medications AND at least five days from the first positive result. A mask is recommended for 5 days post quarantine.     If you have any questions or concerns, or if I can be of further assistance, please do not hesitate to contact me.    Sincerely,             Jesica Martin PA-C"

## 2023-01-02 NOTE — ED PROVIDER NOTES
Encounter Date: 1/1/2023       History     Chief Complaint   Patient presents with    Nasal Congestion     Pt reports nasal congestion since yesterday. Pt reports chills, headache, vomiting X 1, weakness today.     HPI: Ace Sheffield, a 32 y.o. female  has a past medical history of Allergy.    She presents to the ED fore valuation of nasal congestion and chills with headache and one episode of vomiting.  Denies any abdominal pain.  Attest to fever.  No treatments tried prior to arrival.        The history is provided by the patient.   Review of patient's allergies indicates:  No Known Allergies  Past Medical History:   Diagnosis Date    Allergy      Past Surgical History:   Procedure Laterality Date    APPENDECTOMY  2009     Family History   Problem Relation Age of Onset    Breast cancer Neg Hx     Colon cancer Neg Hx     Ovarian cancer Neg Hx      Social History     Tobacco Use    Smoking status: Every Day     Packs/day: 0.25     Types: Cigarettes, Cigars    Smokeless tobacco: Never   Substance Use Topics    Alcohol use: Not Currently     Alcohol/week: 14.0 standard drinks     Types: 14 Cans of beer per week    Drug use: No     Review of Systems   Constitutional:  Positive for chills, fatigue and fever.   HENT:  Positive for congestion.    Gastrointestinal:  Positive for vomiting. Negative for abdominal pain and nausea.   Musculoskeletal:  Positive for myalgias.   Allergic/Immunologic: Negative for immunocompromised state.   Neurological:  Positive for headaches.   All other systems reviewed and are negative.    Physical Exam     Initial Vitals [01/01/23 1742]   BP Pulse Resp Temp SpO2   137/80 104 20 (!) 101.4 °F (38.6 °C) 100 %      MAP       --         Physical Exam    Nursing note and vitals reviewed.  Constitutional: She appears well-developed and well-nourished. She is not diaphoretic. She is Obese . No distress.   HENT:   Head: Normocephalic and atraumatic.   Right Ear: External ear normal.   Left Ear:  External ear normal.   Nose: Nose normal.   Eyes: Conjunctivae and EOM are normal.   Neck:   Normal range of motion.  Cardiovascular:  Normal rate and regular rhythm.           Pulmonary/Chest: No respiratory distress. She has no wheezes. She has no rhonchi. She has no rales.   Musculoskeletal:         General: Normal range of motion.      Cervical back: Normal range of motion.     Neurological: She is alert and oriented to person, place, and time.   Skin: No rash noted.   Psychiatric: She has a normal mood and affect. Thought content normal.       ED Course   Procedures  Labs Reviewed   SARS-COV-2 RNA AMPLIFICATION, QUAL - Abnormal; Notable for the following components:       Result Value    SARS-CoV-2 RNA, Amplification, Qual Positive (*)     All other components within normal limits    Narrative:     Is the patient symptomatic?->Yes    Covid positive result(s) called and verbal readback obtained from   Wandy Hutson RN by LOUIS 01/01/2023 18:36   POCT URINE PREGNANCY - Abnormal; Notable for the following components:    POC Preg Test, Ur Negative (*)     All other components within normal limits   INFLUENZA A & B BY MOLECULAR   GROUP A STREP, MOLECULAR          Imaging Results    None          Medications   acetaminophen tablet 650 mg (650 mg Oral Given 1/1/23 6544)     Medical Decision Making:   Initial Assessment:   Fever, headache  Differential Diagnosis:   Covid, influenza, strep, viral URI   ED Management:  COVID positive.  Discussed risks and benefits of Paxlovid administration patient opted for treatment.  Vital signs do not indicate sepsis, hypoxia nor respiratory distress, and in my professional opinion the patient is well enough for discharge home. The patient was provided with discharge instructions on self-care and how to quarantine at home. I reinforced this advice and the dangers to family and public with failure to comply. We will proceed with symptomatic treatment. The patient was also given a return to  work note, if applicable. Return precautions discussed with the patient. The patient expressed understanding to my instructions.                             Clinical Impression:   Final diagnoses:  [U07.1] COVID-19 (Primary)        ED Disposition Condition    Discharge Stable          ED Prescriptions       Medication Sig Dispense Start Date End Date Auth. Provider    nirmatrelvir-ritonavir 300 mg (150 mg x 2)-100 mg copackaged tablets (EUA) Take 3 tablets by mouth 2 (two) times daily for 5 days. Each dose contains 2 nirmatrelvir (pink tablets) and 1 ritonavir (white tablet). Take all 3 tablets together 30 tablet 1/1/2023 1/6/2023 Jesica Martin PA-C    ondansetron (ZOFRAN-ODT) 4 MG TbDL Take 1 tablet (4 mg total) by mouth every 6 (six) hours as needed (vomiting). 30 tablet 1/1/2023 -- Jesica Martin PA-C    cetirizine (ZYRTEC) 10 MG tablet Take 1 tablet (10 mg total) by mouth once daily. 30 tablet 1/1/2023 1/31/2023 Jesica Martin PA-C    naproxen (NAPROSYN) 500 MG tablet Take 1 tablet (500 mg total) by mouth 2 (two) times daily with meals. 14 tablet 1/1/2023 -- Jesica Martin PA-C    triamcinolone (NASACORT) 55 mcg nasal inhaler 2 sprays by Nasal route once daily. 6.7 mL 1/1/2023 -- Jesica Martin PA-C          Follow-up Information       Follow up With Specialties Details Why Contact Info    Lyn Neville MD Family Medicine   [address], [address]  [place] [address] [phone]               Jesica Martin PA-C  01/01/23 2023

## 2023-01-13 NOTE — PROGRESS NOTES
I assume primary medical responsibility for this patient. I have reviewed the history, physical, and assessement & treatment plan with the resident and agree that the care is reasonable and necessary. This service has been performed by a resident with the presence of a teaching physician for the key parts of the history/exam. If necessary, an addendum of additional findings or evaluation beyond the resident documentation will be noted below.     Janae Dumont MD

## 2023-01-18 ENCOUNTER — CLINICAL SUPPORT (OUTPATIENT)
Dept: REHABILITATION | Facility: HOSPITAL | Age: 33
End: 2023-01-18
Payer: MEDICAID

## 2023-01-18 DIAGNOSIS — M25.512 CHRONIC LEFT SHOULDER PAIN: Primary | ICD-10-CM

## 2023-01-18 DIAGNOSIS — G89.29 CHRONIC LEFT SHOULDER PAIN: Primary | ICD-10-CM

## 2023-01-18 PROCEDURE — 97110 THERAPEUTIC EXERCISES: CPT | Mod: PO,CQ

## 2023-01-18 NOTE — PROGRESS NOTES
"[facility]   Physical Therapy Treatment Note     Name: Ace Sheffield  Clinic Number: [id number]    Therapy Diagnosis:   Encounter Diagnosis   Name Primary?    Chronic left shoulder pain Yes       Physician: Jayson Smith DPM    Visit Date: 1/18/2023    Physician: Jayson Smith DPM     Physician Orders: PT Eval and Treat  Medical Diagnosis from Referral: M54.17 (ICD-10-CM) - Radiculopathy, lumbosacral region  Evaluation Date: 12/5/2022  Authorization Period Expiration: 12/31/2022  Plan of Care Expiration: 3/1/2023  Visit # / Visits authorized: 1/ 1, 1/20    PTA Visit #: 1/5       Time In:  4:00 pm  Time Out: 4:50 pm  Total Billable Time: 50 minutes    SUBJECTIVE     Pt reports:  she was shopping with her grandparents and went to  a box of bleach which irritated her shoulder and caused some increased discomfort.  Pt reported she took some ibuprofen that afternoon and seemed to resolve her symptoms.  Pt noted that her shoulder had been doing better before this incident.   Response to previous treatment:   Functional change: Ongoing     Pain:  Current:  /10  Location: left thoracic, left anterior shoulder       Pts goals: relief from pain       OBJECTIVE     Objective Measures updated at progress report unless specified.       Treatment     Ace received the treatments listed below:      therapeutic exercises to develop strength, endurance, ROM, flexibility, and posture for 50 minutes including:    UBE 4'/4'  Open books x 20 B   Child's pose 15x5"   Seated Swiss ball rollouts x 30   Grey theraband scapular rows 3x10   RTB PNF D1 Flexion 3x10 B   GTB No money's 3x10   Supine horizontal abd. YTB 2 x 10  Supine serratus punches 2 x 10 1# dowel      NP:   Shoulder re-assessment  Green theraband extensions x 20  Seated thoracic ext str with 1/2 foam 10 w 10" hold   Prone T 1# 2x10   Prone Y 1# 2x10   BTB ER/IR 2x10 B   Grey theraband single arm row x15 B       manual therapy " techniques:       Patient Education and Home Exercises     Home Exercises Provided and Patient Education Provided     Education provided:       Written Home Exercises Provided:     ASSESSMENT     Pt with no pain pre or post treatment session.  Pt with good exercises tolerance this afternoon as she was able to perform the above exercises with good tolerance.  Pt required some minor cueing on proper exercise form during theraband exercises.  Will continue to monitor and progress pt within her tolerance.     Ace Is progressing well towards her goals.   Pt prognosis is Good.     Pt will continue to benefit from skilled outpatient physical therapy to address the deficits listed in the problem list box on initial evaluation, provide pt/family education and to maximize pt's level of independence in the home and community environment.     Pt's spiritual, cultural and educational needs considered and pt agreeable to plan of care and goals.     Anticipated barriers to physical therapy: none.     Goals:     Short Term Goals:  4 weeks  1.Report decreased thoracic and shoulder pain  < / =  5/10  to increase tolerance for HEP/ADL  2. Increase ROM where limited in order to perform ADLs without difficulty.  3. Increase strength by 1/3 MMT grade in rotator cuff and periscapular muscles to increase tolerance for ADL and work activities.  4. Pt to tolerate HEP to improve ROM and independence with ADL's     Long Term Goals: 12 weeks  1.Report decreased thoracic and shoulder pain < / = 2/10  to increase tolerance for HEP/ADL  2.Patient goal: relief from pain  3.Increase strength to 4+/5 in  rotator cuff and periscapular muscles to increase tolerance for ADL and work activities.  4. Pt will report at >90% on FOTO  to demonstrate increase in LE function with every day tasks.      PLAN     Cont. PT POC.     Jp Nelson, PTA

## 2023-01-31 ENCOUNTER — OFFICE VISIT (OUTPATIENT)
Dept: FAMILY MEDICINE | Facility: HOSPITAL | Age: 33
End: 2023-01-31
Payer: MEDICAID

## 2023-01-31 VITALS
SYSTOLIC BLOOD PRESSURE: 107 MMHG | WEIGHT: 293 LBS | BODY MASS INDEX: 50.02 KG/M2 | DIASTOLIC BLOOD PRESSURE: 63 MMHG | HEIGHT: 64 IN | HEART RATE: 72 BPM

## 2023-01-31 DIAGNOSIS — R10.2 PELVIC PAIN: Primary | ICD-10-CM

## 2023-01-31 PROCEDURE — 99213 OFFICE O/P EST LOW 20 MIN: CPT

## 2023-01-31 RX ORDER — SPIRONOLACTONE 25 MG/1
1 TABLET ORAL 2 TIMES DAILY
COMMUNITY
End: 2023-01-31

## 2023-01-31 RX ORDER — LURASIDONE HYDROCHLORIDE 40 MG/1
40 TABLET, FILM COATED ORAL
COMMUNITY
Start: 2022-09-08 | End: 2023-01-31

## 2023-01-31 RX ORDER — LURASIDONE HYDROCHLORIDE 60 MG/1
TABLET, FILM COATED ORAL
COMMUNITY
Start: 2022-09-22

## 2023-01-31 RX ORDER — MELOXICAM 15 MG/1
1 TABLET ORAL DAILY
COMMUNITY
End: 2023-01-31

## 2023-01-31 RX ORDER — IBUPROFEN 800 MG/1
800 TABLET ORAL 3 TIMES DAILY
COMMUNITY
Start: 2022-10-25 | End: 2023-05-27 | Stop reason: ALTCHOICE

## 2023-01-31 RX ORDER — NORELGESTROMIN AND ETHINYL ESTRADIOL 35; 150 UG/D; UG/D
PATCH TRANSDERMAL
COMMUNITY
End: 2023-01-31

## 2023-01-31 NOTE — PROGRESS NOTES
[facility]    Subjective:     Ace Sheffield is a 32 y.o. year old female with schizophrenia who presents to clinic for BP follow up but with c/o nausea and pelvic pain.     /63 today, no issues.    Nausea: Has been going on for over a month now. Denies any episodes of emesis. States usually occurs at work and self-resolves. Patient states she will take her meds in the morning, mostly after eating. She denies any acid reflux symptoms including burping, belching, acid taste in mouth, or dysphagia. States she has no issues swallowing or drinking food. Able to eat, has normal appetite. Does endorse some pain around her umbilicus which she states radiates down. Denies any sexual activity, states there is no way she could be pregnant at this time. Denies any bowel or bladder incontinence. Does have Zofran sublingual which she uses sparingly PRN. Patient denies any anxiety. States she tries to figure out why she has nausea and is aware. Does endorse pelvic pain which may be related to the cause of nausea, though unclear. Patient denies any triggers at this time. Denies any alcohol use, spicy foods, or other factors that worsen possible GERD. Patient reports her only abdominal surgery is appendectomy years ago.     Patient with reported menses, regular in that it comes 1x/month. States her cycle is only heavy about 2 days out of the week.     Patient Active Problem List    Diagnosis Date Noted    Chronic left shoulder pain 12/05/2022    Plantar fasciitis 12/07/2021    Acne vulgaris 09/13/2016    Tinea versicolor 09/13/2016        Review of patient's allergies indicates:  No Known Allergies     Past Medical History:   Diagnosis Date    Allergy       Past Surgical History:   Procedure Laterality Date    APPENDECTOMY  2009      Family History   Problem Relation Age of Onset    Breast cancer Neg Hx     Colon cancer Neg Hx     Ovarian cancer Neg Hx       Social History     Tobacco Use    Smoking status: Every  "Day     Packs/day: 0.25     Types: Cigarettes, Cigars    Smokeless tobacco: Never   Substance Use Topics    Alcohol use: Not Currently     Alcohol/week: 14.0 standard drinks     Types: 14 Cans of beer per week        Objective:     Vitals:    01/31/23 1404   BP: 107/63   Pulse: 72   Weight: (!) 137.7 kg (303 lb 9.2 oz)   Height: 5' 4" (1.626 m)     BMI: 52    Physical Exam  Constitutional:       General: She is not in acute distress.     Appearance: She is not toxic-appearing.   Cardiovascular:      Rate and Rhythm: Normal rate and regular rhythm.      Pulses: Normal pulses.   Pulmonary:      Effort: Pulmonary effort is normal. No respiratory distress.   Abdominal:      General: There is no distension.      Tenderness: There is no abdominal tenderness. There is no guarding.   Neurological:      Mental Status: She is alert.   Psychiatric:         Mood and Affect: Mood normal.         Behavior: Behavior normal.         Thought Content: Thought content normal.        Assessment/Plan:     Ace Sheffield is a 32 y.o. year old female who presents to clinic for  schizophrenia who presents to clinic for BP follow up but with c/o nausea and pelvic pain.     1. Pelvic pain  - Unclear of source at this time  -Also endorses heavy periods in past  - US Pelvis Complete Non OB ordered to rule out structural causes  -Patient reports she has an OBGYN already and pap last year which was normal    Nausea  -continue Zofran OTC  -attempt to figure out underlying cause  -could be anxiety though unclear at this time    Follow-up: 1 month or sooner if needed    A total of 30 minutes was spent on patient care during this encounter which included chart review, examining the patient, formulating a treatment plan and documentation.     Medical decision making straight forward and not complex during this visit.     Case discussed with staff: Dr. Kenrda Neville MD  [facility], PGY-1  01/31/2023      "

## 2023-02-01 ENCOUNTER — TELEPHONE (OUTPATIENT)
Dept: FAMILY MEDICINE | Facility: HOSPITAL | Age: 33
End: 2023-02-01
Payer: MEDICAID

## 2023-02-01 DIAGNOSIS — D50.9 IRON DEFICIENCY ANEMIA, UNSPECIFIED IRON DEFICIENCY ANEMIA TYPE: Primary | ICD-10-CM

## 2023-02-01 RX ORDER — FERROUS SULFATE 324(65)MG
324 TABLET, DELAYED RELEASE (ENTERIC COATED) ORAL EVERY OTHER DAY
Qty: 15 TABLET | Refills: 1 | Status: SHIPPED | OUTPATIENT
Start: 2023-02-01 | End: 2023-03-03

## 2023-02-01 NOTE — TELEPHONE ENCOUNTER
Called patient to discuss iron studies labs. Low ferritin and low iron 38. After discussion with patient, we will start Iron supplementation every other day. Will recheck CBC again after 4-6 weeks to assess adequate response to iron supplementation. Discussed side effects including constipation. All questions answered. Patient verbalized understanding.     Lyn Neville MD  [facility], PGY-1  02/01/2023

## 2023-02-14 ENCOUNTER — HOSPITAL ENCOUNTER (OUTPATIENT)
Dept: RADIOLOGY | Facility: HOSPITAL | Age: 33
Discharge: HOME | End: 2023-02-14
Payer: MEDICAID

## 2023-02-14 ENCOUNTER — TELEPHONE (OUTPATIENT)
Dept: FAMILY MEDICINE | Facility: HOSPITAL | Age: 33
End: 2023-02-14
Payer: MEDICAID

## 2023-02-14 DIAGNOSIS — R10.2 PELVIC PAIN: ICD-10-CM

## 2023-02-14 PROCEDURE — 76856 US EXAM PELVIC COMPLETE: CPT | Mod: TC,PO

## 2023-02-14 PROCEDURE — 76830 US PELVIS COMP WITH TRANSVAG NON-OB (XPD): ICD-10-PCS | Mod: 26,,, | Performed by: RADIOLOGY

## 2023-02-14 PROCEDURE — 76856 US PELVIS COMP WITH TRANSVAG NON-OB (XPD): ICD-10-PCS | Mod: 26,,, | Performed by: RADIOLOGY

## 2023-02-14 PROCEDURE — 76856 US EXAM PELVIC COMPLETE: CPT | Mod: 26,,, | Performed by: RADIOLOGY

## 2023-02-14 PROCEDURE — 76830 TRANSVAGINAL US NON-OB: CPT | Mod: 26,,, | Performed by: RADIOLOGY

## 2023-02-15 NOTE — TELEPHONE ENCOUNTER
Called patient to discuss the results of her pelvic ultrasound, multiple uterine fibroids - biggest one of 58 mm. Likely the cause of her on-going pelvic pain and heavy menstrual cycles. Patient already established with OB-GYN, advised her to make an appointment with them to discuss treatment options.     Lyn Neville MD  [facility], PGY-1  02/14/2023

## 2023-02-16 ENCOUNTER — PATIENT MESSAGE (OUTPATIENT)
Dept: OBSTETRICS AND GYNECOLOGY | Facility: CLINIC | Age: 33
End: 2023-02-16
Payer: MEDICAID

## 2023-03-08 ENCOUNTER — OFFICE VISIT (OUTPATIENT)
Dept: OBSTETRICS AND GYNECOLOGY | Facility: CLINIC | Age: 33
End: 2023-03-08
Payer: MEDICAID

## 2023-03-08 VITALS — WEIGHT: 293 LBS | SYSTOLIC BLOOD PRESSURE: 117 MMHG | DIASTOLIC BLOOD PRESSURE: 77 MMHG | BODY MASS INDEX: 51.67 KG/M2

## 2023-03-08 DIAGNOSIS — D21.9 FIBROIDS: Primary | ICD-10-CM

## 2023-03-08 PROCEDURE — 3074F SYST BP LT 130 MM HG: CPT | Mod: CPTII,,, | Performed by: OBSTETRICS & GYNECOLOGY

## 2023-03-08 PROCEDURE — 3008F BODY MASS INDEX DOCD: CPT | Mod: CPTII,,, | Performed by: OBSTETRICS & GYNECOLOGY

## 2023-03-08 PROCEDURE — 99213 OFFICE O/P EST LOW 20 MIN: CPT | Mod: PBBFAC,PO | Performed by: OBSTETRICS & GYNECOLOGY

## 2023-03-08 PROCEDURE — 1159F MED LIST DOCD IN RCRD: CPT | Mod: CPTII,,, | Performed by: OBSTETRICS & GYNECOLOGY

## 2023-03-08 PROCEDURE — 1159F PR MEDICATION LIST DOCUMENTED IN MEDICAL RECORD: ICD-10-PCS | Mod: CPTII,,, | Performed by: OBSTETRICS & GYNECOLOGY

## 2023-03-08 PROCEDURE — 3008F PR BODY MASS INDEX (BMI) DOCUMENTED: ICD-10-PCS | Mod: CPTII,,, | Performed by: OBSTETRICS & GYNECOLOGY

## 2023-03-08 PROCEDURE — 99999 PR PBB SHADOW E&M-EST. PATIENT-LVL III: ICD-10-PCS | Mod: PBBFAC,,, | Performed by: OBSTETRICS & GYNECOLOGY

## 2023-03-08 PROCEDURE — 99214 OFFICE O/P EST MOD 30 MIN: CPT | Mod: S$PBB,,, | Performed by: OBSTETRICS & GYNECOLOGY

## 2023-03-08 PROCEDURE — 99214 PR OFFICE/OUTPT VISIT, EST, LEVL IV, 30-39 MIN: ICD-10-PCS | Mod: S$PBB,,, | Performed by: OBSTETRICS & GYNECOLOGY

## 2023-03-08 PROCEDURE — 3078F DIAST BP <80 MM HG: CPT | Mod: CPTII,,, | Performed by: OBSTETRICS & GYNECOLOGY

## 2023-03-08 PROCEDURE — 3078F PR MOST RECENT DIASTOLIC BLOOD PRESSURE < 80 MM HG: ICD-10-PCS | Mod: CPTII,,, | Performed by: OBSTETRICS & GYNECOLOGY

## 2023-03-08 PROCEDURE — 3074F PR MOST RECENT SYSTOLIC BLOOD PRESSURE < 130 MM HG: ICD-10-PCS | Mod: CPTII,,, | Performed by: OBSTETRICS & GYNECOLOGY

## 2023-03-08 PROCEDURE — 99999 PR PBB SHADOW E&M-EST. PATIENT-LVL III: CPT | Mod: PBBFAC,,, | Performed by: OBSTETRICS & GYNECOLOGY

## 2023-03-13 NOTE — PROGRESS NOTES
Chief Complaint   Patient presents with    Fibroids       HISTORY OF PRESENT ILLNESS:   Ace Sheffield is a 32 y.o. female  who presents for pelvic pain. She was having pain and had an ultrasound done that showed fibroids. She reports the pain starteda  few months ago and is sharp and comes and goes. No bowel or bladder issues. Her periods are not heavy.      Past Medical History:   Diagnosis Date    Allergy           Past Surgical History:   Procedure Laterality Date    APPENDECTOMY  2009         Social History     Socioeconomic History    Marital status: Single    Years of education: 11   Tobacco Use    Smoking status: Every Day     Types: Cigarettes, Vaping with nicotine     Start date: 3/9/2022    Smokeless tobacco: Never   Substance and Sexual Activity    Alcohol use: Not Currently     Alcohol/week: 14.0 standard drinks     Types: 14 Cans of beer per week    Drug use: No    Sexual activity: Not Currently     Comment: not since        Family History   Problem Relation Age of Onset    Breast cancer Neg Hx     Colon cancer Neg Hx     Ovarian cancer Neg Hx          OB History    Para Term  AB Living   0 0 0 0 0 0   SAB IAB Ectopic Multiple Live Births   0 0 0 0 0         COMPREHENSIVE GYN HISTORY:  PAP History: Denies abnormal Paps  Infection History: Denies STDs. Denies PID.  Benign History:has uterine fibroids. Denies ovarian cysts. Denies endometriosis Denies other conditions.  Cancer History: Denies cervical cancer. Denies uterine cancer or hyperplasia. Denies ovarian cancer. Denies vulvar cancer or pre-cancer. Denies vaginal cancer or pre-cancer. Denies breast cancer. Denies colon cancer.  Cycle:   Not sexually active currently but has been in the past     ROS:  Negative     /77   Wt (!) 136.5 kg (301 lb 0.6 oz)   LMP 2023 (Exact Date) Comment: before and during cycle the pain is really bad  BMI 51.67 kg/m²     APPEARANCE: Well nourished, well developed, in no acute  distress.  NECK: Neck symmetric   ABDOMEN: Soft. No tenderness or masses. No hernias. No hepatosplenomegaly.  BREASTS: Symmetrical, no skin changes or visible lesions. No palpable masses, nipple discharge or adenopathy bilaterally.  PELVIC: limited 2/2 habitus   VULVA: No lesions. Normal female genitalia.  URETHRAL MEATUS: Normal size and location, no lesions, no prolapse.  URETHRA: No masses, tenderness, prolapse or scarring.  VAGINA: Moist and well rugated, no discharge, no significant cystocele or rectocele.  CERVIX: No lesions and discharge.  UTERUS: Normal size, regular shape, mobile, non-tender, bladder base nontender.  ADNEXA: No masses or tenderness.    TVUS: The uterus measures 12.0 cm in craniocaudal dimension by 6.7 cm in AP dimension by 6.8 cm in mediolateral dimension.  There are several poorly visualized heterogeneous masses in the uterus.  One of the larger ones measures 58 mm and is located in the body of the uterus.  This obscures the visualization of the endometrial stripe.     The left ovary is normal in appearance.  It measures 2.8 cm by 1.9 cm x 2.4 cm.  It has arterial flow with a peak systolic velocity of 7 centimeters/second.  The right ovary was not visualized.     The urinary bladder is normal in appearance.  There is no free fluid visualized within the pelvis.     Impression:     1. There are several poorly visualized heterogeneous masses in the uterus.  One of the larger ones measures 58 mm and is located in the body of the uterus.  This obscures the visualization of the endometrial stripe.  These are characteristic of uterine fibroids.  The endometrial stripe was not visualized.  If additional imaging evaluation is clinically indicated, I recommend consideration of an MRI examination of the pelvis without and with IV contrast.  2. The right ovary was not visualized.    1. Fibroids        Plan:  Discussed choices for medications vs surgical treatments for fibroids. She is interested in  hysterectomy and understands can't have more children after that. I would recommend symptomatic treatment with NSAIDs and working with PCP for weight loss, will repeat US in 3 months to make sure not enlarging quickly. We discussed would like to do surgery lsc and can be challenging with obese. Will see back in 3 months and discuss scheduling surgery. Understands can't have children after hysterectomy.       Face to Face time with patient: 30 minutes of total time spent on the encounter, which includes face to face time and non-face to face time preparing to see the patient (eg, review of tests), Obtaining and/or reviewing separately obtained history, Documenting clinical information in the electronic or other health record, Independently interpreting results (not separately reported) and communicating results to the patient/family/caregiver, or Care coordination (not separately reported).

## 2023-05-27 ENCOUNTER — HOSPITAL ENCOUNTER (EMERGENCY)
Facility: HOSPITAL | Age: 33
Discharge: HOME | End: 2023-05-27
Attending: EMERGENCY MEDICINE
Payer: MEDICAID

## 2023-05-27 VITALS
SYSTOLIC BLOOD PRESSURE: 172 MMHG | HEART RATE: 69 BPM | OXYGEN SATURATION: 100 % | BODY MASS INDEX: 50.02 KG/M2 | TEMPERATURE: 99 F | WEIGHT: 293 LBS | RESPIRATION RATE: 19 BRPM | DIASTOLIC BLOOD PRESSURE: 101 MMHG | HEIGHT: 64 IN

## 2023-05-27 DIAGNOSIS — D25.9 UTERINE LEIOMYOMA, UNSPECIFIED LOCATION: Primary | ICD-10-CM

## 2023-05-27 DIAGNOSIS — R10.2 SUPRAPUBIC PAIN: ICD-10-CM

## 2023-05-27 LAB
ALBUMIN SERPL BCP-MCNC: 3.8 G/DL (ref 3.5–5.2)
ALP SERPL-CCNC: 100 U/L (ref 38–126)
ALT SERPL W/O P-5'-P-CCNC: 23 U/L (ref 10–44)
ANION GAP SERPL CALC-SCNC: 6 MMOL/L (ref 8–16)
AST SERPL-CCNC: 26 U/L (ref 15–46)
B-HCG UR QL: NEGATIVE
BASOPHILS # BLD AUTO: 0.03 K/UL (ref 0–0.2)
BASOPHILS NFR BLD: 0.4 % (ref 0–1.9)
BILIRUB SERPL-MCNC: 0.3 MG/DL (ref 0.1–1)
BILIRUB UR QL STRIP: NEGATIVE
CALCIUM SERPL-MCNC: 8.8 MG/DL (ref 8.7–10.5)
CHLORIDE SERPL-SCNC: 108 MMOL/L (ref 95–110)
CLARITY UR REFRACT.AUTO: CLEAR
CO2 SERPL-SCNC: 25 MMOL/L (ref 23–29)
COLOR UR AUTO: YELLOW
CREAT SERPL-MCNC: 0.79 MG/DL (ref 0.5–1.4)
CTP QC/QA: YES
DIFFERENTIAL METHOD: ABNORMAL
EOSINOPHIL # BLD AUTO: 0 K/UL (ref 0–0.5)
EOSINOPHIL NFR BLD: 0.4 % (ref 0–8)
ERYTHROCYTE [DISTWIDTH] IN BLOOD BY AUTOMATED COUNT: 15.9 % (ref 11.5–14.5)
EST. GFR  (NO RACE VARIABLE): >60 ML/MIN/1.73 M^2
GLUCOSE SERPL-MCNC: 84 MG/DL (ref 70–110)
GLUCOSE UR QL STRIP: NEGATIVE
HCT VFR BLD AUTO: 32.2 % (ref 37–48.5)
HGB BLD-MCNC: 9.9 G/DL (ref 12–16)
HGB UR QL STRIP: NEGATIVE
IMM GRANULOCYTES # BLD AUTO: 0.02 K/UL (ref 0–0.04)
IMM GRANULOCYTES NFR BLD AUTO: 0.2 % (ref 0–0.5)
KETONES UR QL STRIP: NEGATIVE
LEUKOCYTE ESTERASE UR QL STRIP: NEGATIVE
LIPASE SERPL-CCNC: 104 U/L (ref 23–300)
LYMPHOCYTES # BLD AUTO: 2.8 K/UL (ref 1–4.8)
LYMPHOCYTES NFR BLD: 33.5 % (ref 18–48)
MCH RBC QN AUTO: 24.6 PG (ref 27–31)
MCHC RBC AUTO-ENTMCNC: 30.7 G/DL (ref 32–36)
MCV RBC AUTO: 80 FL (ref 82–98)
MONOCYTES # BLD AUTO: 0.6 K/UL (ref 0.3–1)
MONOCYTES NFR BLD: 7.5 % (ref 4–15)
NEUTROPHILS # BLD AUTO: 4.8 K/UL (ref 1.8–7.7)
NEUTROPHILS NFR BLD: 58 % (ref 38–73)
NITRITE UR QL STRIP: NEGATIVE
NRBC BLD-RTO: 0 /100 WBC
PH UR STRIP: 6 [PH] (ref 5–8)
PLATELET # BLD AUTO: 382 K/UL (ref 150–450)
PMV BLD AUTO: 9.8 FL (ref 9.2–12.9)
POTASSIUM SERPL-SCNC: 4.2 MMOL/L (ref 3.5–5.1)
PROT SERPL-MCNC: 7.1 G/DL (ref 6–8.4)
PROT UR QL STRIP: NEGATIVE
RBC # BLD AUTO: 4.02 M/UL (ref 4–5.4)
SODIUM SERPL-SCNC: 139 MMOL/L (ref 136–145)
SP GR UR STRIP: 1.02 (ref 1–1.03)
URN SPEC COLLECT METH UR: NORMAL
UROBILINOGEN UR STRIP-ACNC: NEGATIVE EU/DL
UUN UR-MCNC: 15 MG/DL (ref 7–17)
WBC # BLD AUTO: 8.28 K/UL (ref 3.9–12.7)

## 2023-05-27 PROCEDURE — 81025 URINE PREGNANCY TEST: CPT | Mod: ER | Performed by: EMERGENCY MEDICINE

## 2023-05-27 PROCEDURE — 83690 ASSAY OF LIPASE: CPT | Mod: ER | Performed by: PHYSICIAN ASSISTANT

## 2023-05-27 PROCEDURE — 81003 URINALYSIS AUTO W/O SCOPE: CPT | Mod: ER | Performed by: EMERGENCY MEDICINE

## 2023-05-27 PROCEDURE — 99284 EMERGENCY DEPT VISIT MOD MDM: CPT | Mod: 25,ER

## 2023-05-27 PROCEDURE — 80053 COMPREHEN METABOLIC PANEL: CPT | Mod: ER | Performed by: PHYSICIAN ASSISTANT

## 2023-05-27 PROCEDURE — 85025 COMPLETE CBC W/AUTO DIFF WBC: CPT | Mod: ER | Performed by: PHYSICIAN ASSISTANT

## 2023-05-27 PROCEDURE — 96374 THER/PROPH/DIAG INJ IV PUSH: CPT | Mod: ER

## 2023-05-27 PROCEDURE — 63600175 PHARM REV CODE 636 W HCPCS: Mod: ER | Performed by: PHYSICIAN ASSISTANT

## 2023-05-27 RX ORDER — NAPROXEN 500 MG/1
500 TABLET ORAL 2 TIMES DAILY
Qty: 14 TABLET | Refills: 0 | Status: SHIPPED | OUTPATIENT
Start: 2023-05-27 | End: 2023-06-03

## 2023-05-27 RX ORDER — KETOROLAC TROMETHAMINE 30 MG/ML
15 INJECTION, SOLUTION INTRAMUSCULAR; INTRAVENOUS
Status: COMPLETED | OUTPATIENT
Start: 2023-05-27 | End: 2023-05-27

## 2023-05-27 RX ADMIN — KETOROLAC TROMETHAMINE 15 MG: 30 INJECTION, SOLUTION INTRAMUSCULAR; INTRAVENOUS at 03:05

## 2023-05-27 NOTE — DISCHARGE INSTRUCTIONS
Drink plenty of fluids.  Take Naprosyn as directed.  Follow-up with your GYN doctor in 1-2 days as discussed.  Return to the emergency department with any new symptoms or concerns, including worsening lower abdominal pain, pelvic bleeding or discharge, nausea or vomiting, fevers or chills, diarrhea, dizziness or lightheadedness, shortness of breath, chest pain, or with any other concerns.  Again, follow-up with your GYN Doctor in 1-2 days, call to make an appointment.

## 2023-05-27 NOTE — ED PROVIDER NOTES
Encounter Date: 5/27/2023       History     Chief Complaint   Patient presents with    Abdominal Pain     Pt states she has lower abd pains to RLQ x 2 days that got worse at work today. Denies fever N,V or D. States is being worked up by PMD/GYN and was told has fibroids.      HPI    Patient is a 33 year-old female who arrives to the Emergency Department complaining of intermittent sharp suprapubic abdominal pain. She reports that pain began 2 days prior to arrival, she reports that while she was at work today pain worsened. She reports that pain is consistent with her uterine fibroid symptoms. She denies nausea or vomiting, fevers or chills, diarrhea.  She denies any pelvic bleeding or discharge.  Reports taking ibuprofen which has helped with symptoms.  She states that she is being seen GYN for uterine fibroids, last seen 3/8/23. She denies taking any other medications or trying any interventions to help with symptoms. History of Appendectomy in 2009.  Review of patient's allergies indicates:  No Known Allergies  Past Medical History:   Diagnosis Date    Allergy      Past Surgical History:   Procedure Laterality Date    APPENDECTOMY  2009     Family History   Problem Relation Age of Onset    Breast cancer Neg Hx     Colon cancer Neg Hx     Ovarian cancer Neg Hx      Social History     Tobacco Use    Smoking status: Every Day     Types: Cigarettes, Vaping with nicotine     Start date: 3/9/2022    Smokeless tobacco: Never   Substance Use Topics    Alcohol use: Not Currently     Alcohol/week: 14.0 standard drinks     Types: 14 Cans of beer per week    Drug use: No     Review of Systems  Constitutional: Negative for fever, chills, night sweats, weight loss  HEENT: Negative for ear pain, sore throat, sinus symptoms, eye pain, vision changes.  Cardiovascular: Negative for chest pain, palpitations, syncope, peripheral edema  Respiratory: Negative for cough, wheezing, hemoptysis, SOB  Gastrointestinal: Positive for  suprapubic abdominal pain, Negative for nausea, vomiting, diarrhea, constipation, melena, hematochezia  Genitourinary: Negative for dysuria, hesitancy, frequency, hematuria  Musculoskeletal: Positive for low back pain. Negative for muscle pain, joint pain  Skin: Negative for rashes, skin lesions, jaundice  Psych: Negative for anxiety, depression, sleep problems, substance abuse  Endocrine: Negative for diabetes  Hematologic: Negative for anticoagulant use  Allergy/Immunologic: Negative for seasonal allergies    Physical Exam     Initial Vitals [05/27/23 1424]   BP Pulse Resp Temp SpO2   (!) 135/90 74 19 98.8 °F (37.1 °C) 100 %      MAP       --         Physical Exam  General: Patient is alert, oriented, cooperative, and appropriate. No acute distress.  HEENT: Head is normocephalic and atraumatic. Sclera anicteric. Sinuses clear. Pupils PEERLA. EOMs intact. Oropharynx clear without lesions, Moist mucous membranes.  Neck: Non tender, Supple with full ROM. No masses, LAD or bruits.  Chest/Heart: RRR w/o murmurs, rubs, or gallops.  Lungs: CTAB without rales, rhonchi or wheezes. Normal Respiratory effort.  Abdomen: Soft, non distended, with tenderness to palpation to suprapubic region. No guarding or peritoneal signs. No pulsatile masses.   Back: No CVA Tenderness.  Skin: Pink, warm and dry. No rashes, lesions or erythema.  Extremities: No clubbing, cyanosis, edema or pallor. Calves non tender, no cords, negative Homans sign.  Musculoskeletal: Atraumatic with full ROM. Neurovascularly intact. Strength 5/5 in all extremities  Neurological: CN II-VII intact. Equal strength in all extremities, sensory grossly intact, Neuro exam nonfocal.  Psych: Affect normal, Judgement normal, Mood normal.    ED Course   Procedures  Emergency Department course and medical decision making  The patient was admitted to the Emergency Department and I was immediately available for evaluation. Nursing triage note, vital signs and past medical  history were all reviewed. Patient afebrile with normal vital signs, appears non toxic with intact neurologic assessment upon arrival to the Emergency Department.    Patient presents with suprapubic pain, history of fibroids.    Discussed and agreed upon plan of care with patient for evaluation and labs as ordered, including CBC, CMP, lipase, urinalysis, and UPT.    Patient remains afebrile with stable vital signs, nontoxic appearing.  Interactive and conversant.  In no acute distress.    UA reviewed, negative.    UPT reviewed, negative.    Labs reviewed, overall benign assessment.    Discussed results with patient, and findings suspicious for Fibroid related pain. Given patient's mild symptoms and unremarkable labs, discussed with patient and agreed to hold off on any imaging at this time.    I discussed recommendations and options for care with patient. Agreed upon plan for Toradol 15mg IV, along with Naprosyn 500mg for home use. Patient was encouraged to continue with supportive care at home, drink plenty of fluids, take medications as directed, continue to monitor for any progression of symptoms, and follow up in 1-2 days with , GYN, for further evaluation. Patient is comfortable with this plan and feels ready for discharge home with plan to follow up with GYN provider this week.    Education given regarding supportive care. I answered all questions.    Patient again was advised to follow-up with , GYN, in 1-2 days, and was given clear instructions to return to the emergency department with any new symptoms or concerns, including worsening abdominal pain, fevers or chills, nausea vomiting, diarrhea, pelvic bleeding or discharge, shortness of breath, chest pain, or with any other concerns.    Patient demonstrates understanding and agrees with plan.    This document was dictated and transcribed using a combination of [employer] speech recognition software and keyboard/typing interface. It has  been reviewed, however there may be grammatical errors and sound alike errors that were not picked up during the finalization of the note. Please contact me if there are any questions or clarifications needed.      Labs Reviewed   CBC W/ AUTO DIFFERENTIAL - Abnormal; Notable for the following components:       Result Value    Hemoglobin 9.9 (*)     Hematocrit 32.2 (*)     MCV 80 (*)     MCH 24.6 (*)     MCHC 30.7 (*)     RDW 15.9 (*)     All other components within normal limits   COMPREHENSIVE METABOLIC PANEL - Abnormal; Notable for the following components:    Anion Gap 6 (*)     All other components within normal limits   URINALYSIS    Narrative:     Collection Type->Urine, Clean Catch   LIPASE   POCT URINE PREGNANCY          Imaging Results    None          Medications   ketorolac injection 15 mg (15 mg Intravenous Given 5/27/23 1540)                              Clinical Impression:   Final diagnoses:  [D25.9] Uterine leiomyoma, unspecified location (Primary)  [R10.2] Suprapubic pain        ED Disposition Condition    Discharge Stable          ED Prescriptions       Medication Sig Dispense Start Date End Date Auth. Provider    naproxen (NAPROSYN) 500 MG tablet Take 1 tablet (500 mg total) by mouth 2 (two) times daily. for 7 days 14 tablet 5/27/2023 6/3/2023 Tito Sunshine PA-C          Follow-up Information       Follow up With Specialties Details Why Contact Info    Amy Ambriz MD Obstetrics and Gynecology In 2 days  [address]  [place] [id number] [phone]      [facility] - Emergency Dept Emergency Medicine  If symptoms worsen [address] [address] [phone]    Lyn Neville MD Family Medicine In 2 days  [address] [facility] [[email or web address]] [address]  [place] [address] [phone]               Tito Sunshine PA-C  05/27/23 7560

## 2023-05-27 NOTE — Clinical Note
"Ace Vasquezbuster Sheffield was seen and treated in our emergency department on 5/27/2023.  She may return to work on 05/30/2023.       If you have any questions or concerns, please don't hesitate to call.      Tito Sunshine PA-C"

## 2023-06-05 ENCOUNTER — LAB VISIT (OUTPATIENT)
Dept: LAB | Facility: HOSPITAL | Age: 33
End: 2023-06-05
Attending: DERMATOLOGY
Payer: MEDICAID

## 2023-06-05 DIAGNOSIS — L02.02 FURUNCLE OF FACE: Primary | ICD-10-CM

## 2023-06-05 LAB — POTASSIUM SERPL-SCNC: 4.5 MMOL/L (ref 3.5–5.1)

## 2023-06-05 PROCEDURE — 84132 ASSAY OF SERUM POTASSIUM: CPT | Mod: PO | Performed by: DERMATOLOGY

## 2023-06-05 PROCEDURE — 36415 COLL VENOUS BLD VENIPUNCTURE: CPT | Mod: PO | Performed by: DERMATOLOGY

## 2023-06-08 ENCOUNTER — HOSPITAL ENCOUNTER (OUTPATIENT)
Dept: RADIOLOGY | Facility: HOSPITAL | Age: 33
Discharge: HOME | End: 2023-06-08
Payer: MEDICAID

## 2023-06-08 DIAGNOSIS — D21.9 FIBROIDS: ICD-10-CM

## 2023-06-08 PROCEDURE — 76830 US PELVIS COMP WITH TRANSVAG NON-OB (XPD): ICD-10-PCS | Mod: 26,,, | Performed by: RADIOLOGY

## 2023-06-08 PROCEDURE — 76856 US EXAM PELVIC COMPLETE: CPT | Mod: 26,,, | Performed by: RADIOLOGY

## 2023-06-08 PROCEDURE — 76830 TRANSVAGINAL US NON-OB: CPT | Mod: 26,,, | Performed by: RADIOLOGY

## 2023-06-08 PROCEDURE — 76856 US EXAM PELVIC COMPLETE: CPT | Mod: TC

## 2023-06-08 PROCEDURE — 76856 US PELVIS COMP WITH TRANSVAG NON-OB (XPD): ICD-10-PCS | Mod: 26,,, | Performed by: RADIOLOGY

## 2023-06-15 ENCOUNTER — TELEPHONE (OUTPATIENT)
Dept: OBSTETRICS AND GYNECOLOGY | Facility: CLINIC | Age: 33
End: 2023-06-15
Payer: MEDICAID

## 2023-06-15 NOTE — TELEPHONE ENCOUNTER
Called spoke to pt, after review of previous notes she and Dr. Ambriz discussed hysterectomy, pt states she thought appointment was to discuss hysterectomy, denies any other concerns. Message sent to essence.

## 2023-06-19 ENCOUNTER — PATIENT MESSAGE (OUTPATIENT)
Dept: OBSTETRICS AND GYNECOLOGY | Facility: CLINIC | Age: 33
End: 2023-06-19
Payer: MEDICAID

## 2023-06-26 ENCOUNTER — PATIENT MESSAGE (OUTPATIENT)
Dept: OBSTETRICS AND GYNECOLOGY | Facility: CLINIC | Age: 33
End: 2023-06-26
Payer: MEDICAID

## 2023-06-28 ENCOUNTER — TELEPHONE (OUTPATIENT)
Dept: OBSTETRICS AND GYNECOLOGY | Facility: HOSPITAL | Age: 33
End: 2023-06-28
Payer: MEDICAID

## 2023-06-28 ENCOUNTER — OFFICE VISIT (OUTPATIENT)
Dept: OBSTETRICS AND GYNECOLOGY | Facility: CLINIC | Age: 33
End: 2023-06-28
Payer: MEDICAID

## 2023-06-28 DIAGNOSIS — D21.9 FIBROIDS: ICD-10-CM

## 2023-06-28 DIAGNOSIS — N85.8 OTHER SPECIFIED NONINFLAMMATORY DISORDERS OF UTERUS: Primary | ICD-10-CM

## 2023-06-28 PROCEDURE — 99212 OFFICE O/P EST SF 10 MIN: CPT | Mod: PBBFAC,PO | Performed by: OBSTETRICS & GYNECOLOGY

## 2023-06-28 PROCEDURE — 1159F PR MEDICATION LIST DOCUMENTED IN MEDICAL RECORD: ICD-10-PCS | Mod: CPTII,,, | Performed by: OBSTETRICS & GYNECOLOGY

## 2023-06-28 PROCEDURE — 99999 PR PBB SHADOW E&M-EST. PATIENT-LVL II: ICD-10-PCS | Mod: PBBFAC,,, | Performed by: OBSTETRICS & GYNECOLOGY

## 2023-06-28 PROCEDURE — 99999 PR PBB SHADOW E&M-EST. PATIENT-LVL II: CPT | Mod: PBBFAC,,, | Performed by: OBSTETRICS & GYNECOLOGY

## 2023-06-28 PROCEDURE — 99213 OFFICE O/P EST LOW 20 MIN: CPT | Mod: S$PBB,,, | Performed by: OBSTETRICS & GYNECOLOGY

## 2023-06-28 PROCEDURE — 1159F MED LIST DOCD IN RCRD: CPT | Mod: CPTII,,, | Performed by: OBSTETRICS & GYNECOLOGY

## 2023-06-28 PROCEDURE — 99213 PR OFFICE/OUTPT VISIT, EST, LEVL III, 20-29 MIN: ICD-10-PCS | Mod: S$PBB,,, | Performed by: OBSTETRICS & GYNECOLOGY

## 2023-06-28 RX ORDER — SPIRONOLACTONE 25 MG/1
TABLET ORAL
COMMUNITY
Start: 2023-06-12

## 2023-06-30 ENCOUNTER — PATIENT MESSAGE (OUTPATIENT)
Dept: OBSTETRICS AND GYNECOLOGY | Facility: CLINIC | Age: 33
End: 2023-06-30
Payer: MEDICAID

## 2023-06-30 RX ORDER — IBUPROFEN 800 MG/1
800 TABLET ORAL EVERY 8 HOURS PRN
Qty: 30 TABLET | Refills: 2 | Status: SHIPPED | OUTPATIENT
Start: 2023-06-30 | End: 2023-11-06 | Stop reason: SDUPTHER

## 2023-06-30 NOTE — PROGRESS NOTES
Chief Complaint   Patient presents with    Ultrasound Follow-Up       HISTORY OF PRESENT ILLNESS:   Ace Sheffield is a 33 y.o. female  who presents for f/u pelvic pain. She was having pain and had an ultrasound done that showed fibroids, repeat US shows they are likely larger. She reports the pain starteda  few months ago and is sharp and comes and goes. No bowel or bladder issues. Her periods are not heavy. She had wanted a hysterectomy but now wants a myomectomy. She isn't thinking she wants children.      Past Medical History:   Diagnosis Date    Allergy           Past Surgical History:   Procedure Laterality Date    APPENDECTOMY  2009         Social History     Socioeconomic History    Marital status: Single    Years of education: 11   Tobacco Use    Smoking status: Every Day     Types: Cigarettes, Vaping with nicotine     Start date: 3/9/2022    Smokeless tobacco: Never   Substance and Sexual Activity    Alcohol use: Not Currently     Alcohol/week: 14.0 standard drinks     Types: 14 Cans of beer per week    Drug use: No    Sexual activity: Not Currently     Comment: not since        Family History   Problem Relation Age of Onset    No Known Problems Father     No Known Problems Mother     Breast cancer Neg Hx     Colon cancer Neg Hx     Ovarian cancer Neg Hx          OB History    Para Term  AB Living   0 0 0 0 0 0   SAB IAB Ectopic Multiple Live Births   0 0 0 0 0         COMPREHENSIVE GYN HISTORY:  PAP History: Denies abnormal Paps  Infection History: Denies STDs. Denies PID.  Benign History:has uterine fibroids. Denies ovarian cysts. Denies endometriosis Denies other conditions.  Cancer History: Denies cervical cancer. Denies uterine cancer or hyperplasia. Denies ovarian cancer. Denies vulvar cancer or pre-cancer. Denies vaginal cancer or pre-cancer. Denies breast cancer. Denies colon cancer.  Cycle:   Not sexually active currently but has been in the past     ROS:  Negative      There were no vitals taken for this visit.    APPEARANCE: Well nourished, well developed, in no acute distress.  NECK: Neck symmetric       2/14/23 TVUS: The uterus measures 12.0 cm in craniocaudal dimension by 6.7 cm in AP dimension by 6.8 cm in mediolateral dimension.  There are several poorly visualized heterogeneous masses in the uterus.  One of the larger ones measures 58 mm and is located in the body of the uterus.  This obscures the visualization of the endometrial stripe.     The left ovary is normal in appearance.  It measures 2.8 cm by 1.9 cm x 2.4 cm.  It has arterial flow with a peak systolic velocity of 7 centimeters/second.  The right ovary was not visualized.     The urinary bladder is normal in appearance.  There is no free fluid visualized within the pelvis.     Impression:     1. There are several poorly visualized heterogeneous masses in the uterus.  One of the larger ones measures 58 mm and is located in the body of the uterus.  This obscures the visualization of the endometrial stripe.  These are characteristic of uterine fibroids.  The endometrial stripe was not visualized.  If additional imaging evaluation is clinically indicated, I recommend consideration of an MRI examination of the pelvis without and with IV contrast.  2. The right ovary was not visualized.    6/8/23 TVUS: Uterus: Size: 9.2 x 0.7 x 7.6 cm Masses: Two intramural fibroids, largest of which measures 6.9 x 7.7 x 7.1 cm. Endometrium: Normal in this pre menopausal patient, measuring 9 mm.   Right ovary: Size: 3 x 3 x 2 cm Appearance: Normal Vascular flow: Normal.   Left ovary: Size: 4 x 3 x 2 cm Appearance: Normal Vascular Flow: Normal.   Free Fluid: None.   Impression: uterine fibroids, similar when compared to the previous ultrasound    1. Other specified noninflammatory disorders of uterus    2. Fibroids        Plan:  Discussed choices for medications vs surgical treatments (hyst vs myomectomy vs UAE) for fibroids. She is  interested in hysterectomy and understands can't have more children after that. We discussed would like to do surgery lsc and can be challenging being obese, she has been working on weight loss and doing well.  Understands can't have children after hysterectomy. Discussed risks benefits to both and she had originally wanted a hysterectomy but now would like a myomectomy. She understands likely would need c-sections if had myomectomy, discussed risk of recurrence and scar tissue ect. Will get MRI and refer to Dr. Flores for consultation.        Face to Face time with patient: 20 minutes of total time spent on the encounter, which includes face to face time and non-face to face time preparing to see the patient (eg, review of tests), Obtaining and/or reviewing separately obtained history, Documenting clinical information in the electronic or other health record, Independently interpreting results (not separately reported) and communicating results to the patient/family/caregiver, or Care coordination (not separately reported).               negative - no fever

## 2023-07-13 ENCOUNTER — HOSPITAL ENCOUNTER (OUTPATIENT)
Dept: RADIOLOGY | Facility: HOSPITAL | Age: 33
Discharge: HOME | End: 2023-07-13
Attending: OBSTETRICS & GYNECOLOGY
Payer: MEDICAID

## 2023-07-13 DIAGNOSIS — D21.9 FIBROIDS: ICD-10-CM

## 2023-07-13 DIAGNOSIS — N85.8 OTHER SPECIFIED NONINFLAMMATORY DISORDERS OF UTERUS: ICD-10-CM

## 2023-07-13 PROCEDURE — 72197 MRI PELVIS W/O & W/DYE: CPT | Mod: TC

## 2023-07-13 PROCEDURE — 72197 MRI PELVIS W WO CONTRAST: ICD-10-PCS | Mod: 26,,, | Performed by: RADIOLOGY

## 2023-07-13 PROCEDURE — 25500020 PHARM REV CODE 255: Performed by: OBSTETRICS & GYNECOLOGY

## 2023-07-13 PROCEDURE — 72197 MRI PELVIS W/O & W/DYE: CPT | Mod: 26,,, | Performed by: RADIOLOGY

## 2023-07-13 PROCEDURE — A9585 GADOBUTROL INJECTION: HCPCS | Performed by: OBSTETRICS & GYNECOLOGY

## 2023-07-13 RX ORDER — GADOBUTROL 604.72 MG/ML
10 INJECTION INTRAVENOUS
Status: COMPLETED | OUTPATIENT
Start: 2023-07-13 | End: 2023-07-13

## 2023-07-13 RX ADMIN — GADOBUTROL 10 ML: 604.72 INJECTION INTRAVENOUS at 05:07

## 2023-07-19 ENCOUNTER — PATIENT MESSAGE (OUTPATIENT)
Dept: OBSTETRICS AND GYNECOLOGY | Facility: CLINIC | Age: 33
End: 2023-07-19
Payer: MEDICAID

## 2023-07-20 ENCOUNTER — PATIENT MESSAGE (OUTPATIENT)
Dept: OBSTETRICS AND GYNECOLOGY | Facility: CLINIC | Age: 33
End: 2023-07-20
Payer: MEDICAID

## 2023-07-24 ENCOUNTER — LAB VISIT (OUTPATIENT)
Dept: LAB | Facility: HOSPITAL | Age: 33
End: 2023-07-24
Attending: PSYCHIATRY & NEUROLOGY
Payer: MEDICAID

## 2023-07-24 DIAGNOSIS — F20.0 PARANOID SCHIZOPHRENIA: Primary | ICD-10-CM

## 2023-07-24 LAB
ALBUMIN SERPL BCP-MCNC: 3.6 G/DL (ref 3.5–5.2)
ALP SERPL-CCNC: 102 U/L (ref 38–126)
ALT SERPL W/O P-5'-P-CCNC: 23 U/L (ref 10–44)
ANION GAP SERPL CALC-SCNC: 6 MMOL/L (ref 8–16)
AST SERPL-CCNC: 27 U/L (ref 15–46)
BASOPHILS # BLD AUTO: 0.02 K/UL (ref 0–0.2)
BASOPHILS NFR BLD: 0.3 % (ref 0–1.9)
BILIRUB SERPL-MCNC: 0.3 MG/DL (ref 0.1–1)
CALCIUM SERPL-MCNC: 8.9 MG/DL (ref 8.7–10.5)
CHLORIDE SERPL-SCNC: 108 MMOL/L (ref 95–110)
CHOLEST SERPL-MCNC: 171 MG/DL (ref 120–199)
CHOLEST/HDLC SERPL: 3.7 {RATIO} (ref 2–5)
CO2 SERPL-SCNC: 26 MMOL/L (ref 23–29)
CREAT SERPL-MCNC: 0.81 MG/DL (ref 0.5–1.4)
DIFFERENTIAL METHOD: ABNORMAL
EOSINOPHIL # BLD AUTO: 0.1 K/UL (ref 0–0.5)
EOSINOPHIL NFR BLD: 0.8 % (ref 0–8)
ERYTHROCYTE [DISTWIDTH] IN BLOOD BY AUTOMATED COUNT: 15.9 % (ref 11.5–14.5)
EST. GFR  (NO RACE VARIABLE): >60 ML/MIN/1.73 M^2
ESTIMATED AVG GLUCOSE: 108 MG/DL (ref 68–131)
GLUCOSE SERPL-MCNC: 96 MG/DL (ref 70–110)
HBA1C MFR BLD: 5.4 % (ref 4–5.6)
HCT VFR BLD AUTO: 32 % (ref 37–48.5)
HDLC SERPL-MCNC: 46 MG/DL (ref 40–75)
HDLC SERPL: 26.9 % (ref 20–50)
HGB BLD-MCNC: 9.6 G/DL (ref 12–16)
IMM GRANULOCYTES # BLD AUTO: 0.02 K/UL (ref 0–0.04)
IMM GRANULOCYTES NFR BLD AUTO: 0.3 % (ref 0–0.5)
LDLC SERPL CALC-MCNC: 107.8 MG/DL (ref 63–159)
LYMPHOCYTES # BLD AUTO: 2.2 K/UL (ref 1–4.8)
LYMPHOCYTES NFR BLD: 35.8 % (ref 18–48)
MCH RBC QN AUTO: 23.9 PG (ref 27–31)
MCHC RBC AUTO-ENTMCNC: 30 G/DL (ref 32–36)
MCV RBC AUTO: 80 FL (ref 82–98)
MONOCYTES # BLD AUTO: 0.6 K/UL (ref 0.3–1)
MONOCYTES NFR BLD: 9.6 % (ref 4–15)
NEUTROPHILS # BLD AUTO: 3.3 K/UL (ref 1.8–7.7)
NEUTROPHILS NFR BLD: 53.2 % (ref 38–73)
NONHDLC SERPL-MCNC: 125 MG/DL
NRBC BLD-RTO: 0 /100 WBC
PLATELET # BLD AUTO: 411 K/UL (ref 150–450)
PMV BLD AUTO: 9.5 FL (ref 9.2–12.9)
POTASSIUM SERPL-SCNC: 4.4 MMOL/L (ref 3.5–5.1)
PROT SERPL-MCNC: 7.1 G/DL (ref 6–8.4)
RBC # BLD AUTO: 4.01 M/UL (ref 4–5.4)
SODIUM SERPL-SCNC: 140 MMOL/L (ref 136–145)
T4 FREE SERPL-MCNC: 0.98 NG/DL (ref 0.71–1.51)
TRIGL SERPL-MCNC: 86 MG/DL (ref 30–150)
TSH SERPL DL<=0.005 MIU/L-ACNC: 0.52 UIU/ML (ref 0.4–4)
UUN UR-MCNC: 15 MG/DL (ref 7–17)
WBC # BLD AUTO: 6.17 K/UL (ref 3.9–12.7)

## 2023-07-24 PROCEDURE — 84443 ASSAY THYROID STIM HORMONE: CPT | Mod: PO | Performed by: PSYCHIATRY & NEUROLOGY

## 2023-07-24 PROCEDURE — 84439 ASSAY OF FREE THYROXINE: CPT | Performed by: PSYCHIATRY & NEUROLOGY

## 2023-07-24 PROCEDURE — 80053 COMPREHEN METABOLIC PANEL: CPT | Mod: PO | Performed by: PSYCHIATRY & NEUROLOGY

## 2023-07-24 PROCEDURE — 83036 HEMOGLOBIN GLYCOSYLATED A1C: CPT | Performed by: PSYCHIATRY & NEUROLOGY

## 2023-07-24 PROCEDURE — 36415 COLL VENOUS BLD VENIPUNCTURE: CPT | Mod: PO | Performed by: PSYCHIATRY & NEUROLOGY

## 2023-07-24 PROCEDURE — 80061 LIPID PANEL: CPT | Performed by: PSYCHIATRY & NEUROLOGY

## 2023-07-24 PROCEDURE — 85025 COMPLETE CBC W/AUTO DIFF WBC: CPT | Mod: PO | Performed by: PSYCHIATRY & NEUROLOGY

## 2023-08-28 ENCOUNTER — HOSPITAL ENCOUNTER (EMERGENCY)
Facility: HOSPITAL | Age: 33
Discharge: HOME | End: 2023-08-28
Attending: EMERGENCY MEDICINE
Payer: MEDICAID

## 2023-08-28 VITALS
RESPIRATION RATE: 16 BRPM | TEMPERATURE: 99 F | DIASTOLIC BLOOD PRESSURE: 80 MMHG | SYSTOLIC BLOOD PRESSURE: 130 MMHG | BODY MASS INDEX: 51.84 KG/M2 | OXYGEN SATURATION: 100 % | WEIGHT: 293 LBS | HEART RATE: 80 BPM

## 2023-08-28 DIAGNOSIS — H10.32 ACUTE CONJUNCTIVITIS OF LEFT EYE, UNSPECIFIED ACUTE CONJUNCTIVITIS TYPE: Primary | ICD-10-CM

## 2023-08-28 PROCEDURE — 25000003 PHARM REV CODE 250: Mod: ER | Performed by: NURSE PRACTITIONER

## 2023-08-28 PROCEDURE — 99283 EMERGENCY DEPT VISIT LOW MDM: CPT | Mod: ER

## 2023-08-28 RX ORDER — PROPARACAINE HYDROCHLORIDE 5 MG/ML
2 SOLUTION/ DROPS OPHTHALMIC
Status: COMPLETED | OUTPATIENT
Start: 2023-08-28 | End: 2023-08-28

## 2023-08-28 RX ORDER — LEVOFLOXACIN 5 MG/ML
1 SOLUTION OPHTHALMIC EVERY 4 HOURS
Qty: 5 ML | Refills: 0 | Status: SHIPPED | OUTPATIENT
Start: 2023-08-28 | End: 2023-09-07

## 2023-08-28 RX ADMIN — FLUORESCEIN SODIUM 1 EACH: 1 STRIP OPHTHALMIC at 01:08

## 2023-08-28 RX ADMIN — PROPARACAINE HYDROCHLORIDE 2 DROP: 5 SOLUTION/ DROPS OPHTHALMIC at 01:08

## 2023-08-28 NOTE — ED PROVIDER NOTES
Encounter Date: 8/28/2023       History     Chief Complaint   Patient presents with    Conjunctivitis     Redness, itching and pain to left eye that started last night     This is a 33-year-old  female with significant past medical history of obesity that presents the ED with complaint of left eye redness, itching, and pain x2 days.  She denies any fever, runny nose, congestion, sore throat, cough, shortness of breath, chest pain, abdominal pain, nausea, vomiting, or diarrhea.  She is taken no medication for her pain.  She denies any known sick contacts.      Review of patient's allergies indicates:  No Known Allergies  Past Medical History:   Diagnosis Date    Allergy      Past Surgical History:   Procedure Laterality Date    APPENDECTOMY  2009     Family History   Problem Relation Age of Onset    No Known Problems Father     No Known Problems Mother     Breast cancer Neg Hx     Colon cancer Neg Hx     Ovarian cancer Neg Hx      Social History     Tobacco Use    Smoking status: Every Day     Types: Cigarettes, Vaping with nicotine     Start date: 3/9/2022    Smokeless tobacco: Never   Substance Use Topics    Alcohol use: Not Currently     Alcohol/week: 14.0 standard drinks of alcohol     Types: 14 Cans of beer per week    Drug use: No     Review of Systems   Constitutional:  Negative for fever.   HENT:  Negative for congestion, rhinorrhea and sore throat.    Eyes:  Positive for pain, redness and itching. Negative for photophobia, discharge and visual disturbance.   Respiratory:  Negative for cough and shortness of breath.    Cardiovascular:  Negative for chest pain and palpitations.   Psychiatric/Behavioral:  Negative for agitation and behavioral problems.        Physical Exam     Initial Vitals [08/28/23 1149]   BP Pulse Resp Temp SpO2   124/82 82 20 99 °F (37.2 °C) 100 %      MAP       --         Physical Exam    Constitutional: She appears well-developed and well-nourished.   HENT:   Head:  Normocephalic and atraumatic.   Right Ear: External ear normal.   Left Ear: External ear normal.   Nose: Nose normal.   Mouth/Throat: Oropharynx is clear and moist.   Eyes: EOM and lids are normal. Pupils are equal, round, and reactive to light. Left conjunctiva is injected. Left conjunctiva has no hemorrhage.   Cardiovascular:  Normal rate, regular rhythm and normal heart sounds.           Pulmonary/Chest: Breath sounds normal. She has no wheezes.           ED Course   Procedures  Labs Reviewed - No data to display       Imaging Results    None          Medications   fluorescein ophthalmic strip 1 each (1 each Left Eye Given 8/28/23 1309)   proparacaine 0.5 % ophthalmic solution 2 drop (2 drops Left Eye Given 8/28/23 1309)     Medical Decision Making  This is a 33-year-old  female presents to ED complaining of left eye erythema, pruritus, and pain x2 days.  On arrival the patient is afebrile and nontoxic-appearing please see physical exam for further details.  I do not suspect a foreign body based on my history and physical exam.  I believe the patient likely has early bacterial versus viral conjunctivitis.  I do not suspect any emergent or life-threatening ocular process.  I do not believe imaging is warranted at this time.  The patient will be treated with levofloxacin drops.  She is instructed to follow up with her PCP in the next 2-3 days or return to the ED for worsening.  She verbalized understanding and was agreeable to the treatment plan.                               Clinical Impression:   Final diagnoses:  [H10.32] Acute conjunctivitis of left eye, unspecified acute conjunctivitis type (Primary)        ED Disposition Condition    Discharge Stable          ED Prescriptions       Medication Sig Dispense Start Date End Date Auth. Provider    levoFLOXacin (QUIXIN) 0.5 % ophthalmic solution Place 1 drop into the left eye every 4 (four) hours. for 10 days 5 mL 8/28/2023 9/7/2023 Hector Sims  LEIGHA CURRY          Follow-up Information       Follow up With Specialties Details Why Contact Info    Lyn Neville MD Family Medicine In 2 days  [address], [address] [address] [phone]      [facility] - Emergency Dept Emergency Medicine  If symptoms worsen [address] [facility] [address] [phone]             Hector Sims PA-C  08/28/23 9348

## 2023-08-28 NOTE — ED NOTES
PT presents to the ED with C/O pain to left eye x Saturday. Redness noted to left eye. Denies drainage. VSS.

## 2023-08-28 NOTE — FIRST PROVIDER EVALUATION
Emergency Department TeleTriage Encounter Note      CHIEF COMPLAINT    Chief Complaint   Patient presents with    Conjunctivitis     Redness, itching and pain to left eye that started last night       VITAL SIGNS   Initial Vitals [08/28/23 1149]   BP Pulse Resp Temp SpO2   124/82 82 20 99 °F (37.2 °C) 100 %      MAP       --            ALLERGIES    Review of patient's allergies indicates:  No Known Allergies    PROVIDER TRIAGE NOTE  This is a teletriage evaluation of a 33 y.o. female presenting to the ED complaining of left eye redness and pain since Saturday. Reports eye itches. No trauma, foreign body, or visual changes.     Initial orders will be placed and care will be transferred to an alternate provider when patient is roomed for a full evaluation. Any additional orders and the final disposition will be determined by that provider.         ORDERS  Labs Reviewed - No data to display    ED Orders (720h ago, onward)      Start Ordered     Status Ordering Provider    08/28/23 1215 08/28/23 1210  fluorescein ophthalmic strip 1 each  ED 1 Time         Ordered LEX SAEED N.    08/28/23 1215 08/28/23 1210  proparacaine 0.5 % ophthalmic solution 2 drop  ED 1 Time         Ordered LEX SAEED N.    08/28/23 1211 08/28/23 1210  Visual acuity screening  Once         Ordered LEX SAEED              Virtual Visit Note: The provider triage portion of this emergency department evaluation and documentation was performed via [employer], a HIPAA-compliant telemedicine application, in concert with a tele-presenter in the room. A face to face patient evaluation with one of my colleagues will occur once the patient is placed in an emergency department room.      DISCLAIMER: This note was prepared with [employer] voice recognition transcription software. Garbled syntax, mangled pronouns, and other bizarre constructions may be attributed to that software system.

## 2023-10-26 ENCOUNTER — OFFICE VISIT (OUTPATIENT)
Dept: OBSTETRICS AND GYNECOLOGY | Facility: CLINIC | Age: 33
End: 2023-10-26
Payer: MEDICAID

## 2023-10-26 VITALS — DIASTOLIC BLOOD PRESSURE: 86 MMHG | WEIGHT: 293 LBS | SYSTOLIC BLOOD PRESSURE: 132 MMHG | BODY MASS INDEX: 52.11 KG/M2

## 2023-10-26 DIAGNOSIS — Z72.0 NICOTINE USE: ICD-10-CM

## 2023-10-26 DIAGNOSIS — D21.9 FIBROIDS: Primary | ICD-10-CM

## 2023-10-26 PROCEDURE — 3044F PR MOST RECENT HEMOGLOBIN A1C LEVEL <7.0%: ICD-10-PCS | Mod: CPTII,,, | Performed by: OBSTETRICS & GYNECOLOGY

## 2023-10-26 PROCEDURE — 3008F BODY MASS INDEX DOCD: CPT | Mod: CPTII,,, | Performed by: OBSTETRICS & GYNECOLOGY

## 2023-10-26 PROCEDURE — 99215 OFFICE O/P EST HI 40 MIN: CPT | Mod: PBBFAC,PO | Performed by: OBSTETRICS & GYNECOLOGY

## 2023-10-26 PROCEDURE — 3075F PR MOST RECENT SYSTOLIC BLOOD PRESS GE 130-139MM HG: ICD-10-PCS | Mod: CPTII,,, | Performed by: OBSTETRICS & GYNECOLOGY

## 2023-10-26 PROCEDURE — 3079F PR MOST RECENT DIASTOLIC BLOOD PRESSURE 80-89 MM HG: ICD-10-PCS | Mod: CPTII,,, | Performed by: OBSTETRICS & GYNECOLOGY

## 2023-10-26 PROCEDURE — 3075F SYST BP GE 130 - 139MM HG: CPT | Mod: CPTII,,, | Performed by: OBSTETRICS & GYNECOLOGY

## 2023-10-26 PROCEDURE — 1159F MED LIST DOCD IN RCRD: CPT | Mod: CPTII,,, | Performed by: OBSTETRICS & GYNECOLOGY

## 2023-10-26 PROCEDURE — 3079F DIAST BP 80-89 MM HG: CPT | Mod: CPTII,,, | Performed by: OBSTETRICS & GYNECOLOGY

## 2023-10-26 PROCEDURE — 1159F PR MEDICATION LIST DOCUMENTED IN MEDICAL RECORD: ICD-10-PCS | Mod: CPTII,,, | Performed by: OBSTETRICS & GYNECOLOGY

## 2023-10-26 PROCEDURE — 3044F HG A1C LEVEL LT 7.0%: CPT | Mod: CPTII,,, | Performed by: OBSTETRICS & GYNECOLOGY

## 2023-10-26 PROCEDURE — 99214 PR OFFICE/OUTPT VISIT, EST, LEVL IV, 30-39 MIN: ICD-10-PCS | Mod: S$PBB,,, | Performed by: OBSTETRICS & GYNECOLOGY

## 2023-10-26 PROCEDURE — 1160F PR REVIEW ALL MEDS BY PRESCRIBER/CLIN PHARMACIST DOCUMENTED: ICD-10-PCS | Mod: CPTII,,, | Performed by: OBSTETRICS & GYNECOLOGY

## 2023-10-26 PROCEDURE — 3008F PR BODY MASS INDEX (BMI) DOCUMENTED: ICD-10-PCS | Mod: CPTII,,, | Performed by: OBSTETRICS & GYNECOLOGY

## 2023-10-26 PROCEDURE — 99999 PR PBB SHADOW E&M-EST. PATIENT-LVL V: ICD-10-PCS | Mod: PBBFAC,,, | Performed by: OBSTETRICS & GYNECOLOGY

## 2023-10-26 PROCEDURE — 99999 PR PBB SHADOW E&M-EST. PATIENT-LVL V: CPT | Mod: PBBFAC,,, | Performed by: OBSTETRICS & GYNECOLOGY

## 2023-10-26 PROCEDURE — 1160F RVW MEDS BY RX/DR IN RCRD: CPT | Mod: CPTII,,, | Performed by: OBSTETRICS & GYNECOLOGY

## 2023-10-26 PROCEDURE — 99214 OFFICE O/P EST MOD 30 MIN: CPT | Mod: S$PBB,,, | Performed by: OBSTETRICS & GYNECOLOGY

## 2023-10-26 NOTE — PROGRESS NOTES
CC: Symptoms related to fibroids    Ace Sheffield is a 33 y.o. female  presents for a consultation for management of fibroids.      She reports pelvic pain a few months ago. She saw her PCP who ordered an ultrasound which showed the fibroid.     She reports cycles are regular. She uses overnight pads that she has to change twice a day. She denies heavy clots. She is anemic and is taking iron.     She is not interested in future fertility.     MRI shows:    FINDINGS:  Patient was administered 8 cc of Gadavist.     There is a 6 cm posterior uterine leiomyoma.  Endometrium is unremarkable.  There is a 2nd smaller fibroid measuring 1.8 cm.  Ovaries are unremarkable.  No adenopathy is seen.  No pedunculated fibroids are seen.  No complication seen.  There is a cervical nabothian cyst.     Impression:     6 cm and 1.8 cm uterine leiomyomas.  No complication seen.  No pedunculated fibroid seen    Past Medical History:   Diagnosis Date    Allergy        Past Surgical History:   Procedure Laterality Date    APPENDECTOMY  2009       Family History   Problem Relation Age of Onset    No Known Problems Father     No Known Problems Mother     Breast cancer Neg Hx     Colon cancer Neg Hx     Ovarian cancer Neg Hx        Social History     Tobacco Use    Smoking status: Every Day     Types: Cigarettes, Vaping with nicotine     Start date: 3/9/2022    Smokeless tobacco: Never   Substance Use Topics    Alcohol use: Not Currently     Alcohol/week: 14.0 standard drinks of alcohol     Types: 14 Cans of beer per week    Drug use: Never       OB History    Para Term  AB Living   0 0 0 0 0 0   SAB IAB Ectopic Multiple Live Births   0 0 0 0 0       /86   Wt (!) 137.7 kg (303 lb 9.2 oz)   LMP 10/04/2023 (Exact Date)   BMI 52.11 kg/m²     ROS:  GENERAL: Denies weight gain or weight loss. Feeling well overall.   SKIN: Denies rash or lesions.   HEAD: Denies head injury or headache.   NODES: Denies enlarged lymph  nodes.   CHEST: Denies chest pain or shortness of breath.   CARDIOVASCULAR: Denies palpitations or left sided chest pain.   ABDOMEN: No abdominal pain, constipation, diarrhea, nausea, vomiting or rectal bleeding.   URINARY: No frequency, dysuria, hematuria, or burning on urination.  REPRODUCTIVE: See HPI.   HEMATOLOGIC: No easy bruisability or excessive bleeding with the exception of menstrual cycles.  MUSCULOSKELETAL: Denies joint pain or swelling.   NEUROLOGIC: Denies syncope or weakness.   PSYCHIATRIC: Denies depression, anxiety or mood swings.    APPEARANCE: Well nourished, well developed, in no acute distress.  AFFECT: WNL, alert and oriented x 3  SKIN: No acne or hirsutism  NECK: Neck symmetric without masses or thyromegaly  NODES: No inguinal, cervical, axillary, or femoral lymph node enlargement  CHEST: Good respiratory effect  ABDOMEN: Soft.  No tenderness or masses.  No hepatosplenomegaly.  No hernias.  BREASTS: Symmetrical, no skin changes or visible lesions.  No palpable masses, nipple discharge bilaterally.  PELVIC: Normal external genitalia without lesions.  Normal hair distribution.  Adequate perineal body, normal urethral meatus.  Vagina moist and well rugated without lesions or discharge.  Cervix pink, without lesions, discharge or tenderness.  No significant cystocele or rectocele.  Bimanual exam shows uterus to be normal size, regular, mobile and nontender  but difficult to assess due to body habitus.  Adnexa without masses or tenderness.    EXTREMITIES: No edema.      ICD-10-CM ICD-9-CM    1. Fibroids  D21.9 215.9 Case Request Operating Room: ROBOTIC HYSTERECTOMY, CYSTOSCOPY      Case Request Operating Room: ROBOTIC HYSTERECTOMY, CYSTOSCOPY      Full code      Vital signs      [place] to [place]      Insert peripheral IV      POCT glucose      Notify physician if BS > 180 for hysterectomy patients      POCT urine pregnancy      Notify Physician/Vital Signs Parameters Urine output less than  0.5mL/kg/hr (with indwelling catheter) or 30 mL/hr (without indwelling catheter) or blood glucose greater than 200 mg/dL      Notify physician      Diet NPO Except for: Medication      Place sequential compression device      Transfer patient      Admit to Phase I PACU, transfer to phase II level of care when Michaela score is 9 out of 10      Vital signs      Straight Cath      Intake and output Per protocol      Apply warming blanket      POCT glucose      Notify Physician (specify)      Notify Physician      Notify Physician (specify)      Notify Anesthesiologist      Oxygen Continuous      Pulse Oximetry Continuous      Vital signs      Pulse Oximetry Q12H PRN      Remove dressing      Gipson catheter - discontinue      Diet Adult Regular (IDDSI Level 7) [facility]      DISCHARGE PATIENT 1) Tolerating PO, No emesis x 2 hours 2) Ambulates with assistance appropriate to age and health status (to baseline ability) 3) Voided or has been instructed on how to respond if difficulty voiding 4) Vital signs stable (within ...      Discontinue IV      Place in Outpatient      Type & Screen      2. Nicotine use  Z72.0 305.1 Ambulatory referral/consult to Smoking Cessation Program            Patient was counseled today on treatment options for fibroids including Orhiann/Myfembree, UFE, lap RFA, myomectomy, and hysterectomy.  Patient desires a hysterectomy. Recommended ovarian conservation due to her age. Due to her body habitus, recommended robotic approach to complete the surgery quicker for less exposure to Trendelenburg position.     DVH 2/27

## 2023-11-05 ENCOUNTER — PATIENT MESSAGE (OUTPATIENT)
Dept: OBSTETRICS AND GYNECOLOGY | Facility: CLINIC | Age: 33
End: 2023-11-05
Payer: MEDICAID

## 2023-11-05 RX ORDER — SODIUM CHLORIDE, SODIUM LACTATE, POTASSIUM CHLORIDE, CALCIUM CHLORIDE 600; 310; 30; 20 MG/100ML; MG/100ML; MG/100ML; MG/100ML
INJECTION, SOLUTION INTRAVENOUS CONTINUOUS
Status: CANCELLED | OUTPATIENT
Start: 2023-11-05

## 2023-11-05 RX ORDER — KETOROLAC TROMETHAMINE 30 MG/ML
30 INJECTION, SOLUTION INTRAMUSCULAR; INTRAVENOUS
Status: CANCELLED | OUTPATIENT
Start: 2023-11-05 | End: 2023-11-06

## 2023-11-05 RX ORDER — PROCHLORPERAZINE EDISYLATE 5 MG/ML
5 INJECTION INTRAMUSCULAR; INTRAVENOUS EVERY 6 HOURS PRN
Status: CANCELLED | OUTPATIENT
Start: 2023-11-05

## 2023-11-05 RX ORDER — MEPERIDINE HYDROCHLORIDE 50 MG/ML
12.5 INJECTION INTRAMUSCULAR; INTRAVENOUS; SUBCUTANEOUS ONCE AS NEEDED
Status: CANCELLED | OUTPATIENT
Start: 2023-11-05 | End: 2023-11-06

## 2023-11-05 RX ORDER — IBUPROFEN 400 MG/1
800 TABLET ORAL
Status: CANCELLED | OUTPATIENT
Start: 2023-11-06

## 2023-11-05 RX ORDER — HYDROCODONE BITARTRATE AND ACETAMINOPHEN 5; 325 MG/1; MG/1
1 TABLET ORAL EVERY 4 HOURS PRN
Status: CANCELLED | OUTPATIENT
Start: 2023-11-05

## 2023-11-05 RX ORDER — AMOXICILLIN 250 MG
1 CAPSULE ORAL 2 TIMES DAILY
Status: CANCELLED | OUTPATIENT
Start: 2023-11-05

## 2023-11-05 RX ORDER — PROCHLORPERAZINE EDISYLATE 5 MG/ML
5 INJECTION INTRAMUSCULAR; INTRAVENOUS EVERY 10 MIN PRN
Status: CANCELLED | OUTPATIENT
Start: 2023-11-05

## 2023-11-05 RX ORDER — HYDROCODONE BITARTRATE AND ACETAMINOPHEN 10; 325 MG/1; MG/1
1 TABLET ORAL EVERY 4 HOURS PRN
Status: CANCELLED | OUTPATIENT
Start: 2023-11-05

## 2023-11-05 RX ORDER — LIDOCAINE HYDROCHLORIDE 10 MG/ML
0.5 INJECTION, SOLUTION EPIDURAL; INFILTRATION; INTRACAUDAL; PERINEURAL
Status: CANCELLED | OUTPATIENT
Start: 2023-11-05

## 2023-11-05 RX ORDER — HYDROMORPHONE HYDROCHLORIDE 1 MG/ML
1 INJECTION, SOLUTION INTRAMUSCULAR; INTRAVENOUS; SUBCUTANEOUS EVERY 4 HOURS PRN
Status: CANCELLED | OUTPATIENT
Start: 2023-11-05

## 2023-11-05 RX ORDER — ACETAMINOPHEN 325 MG/1
650 TABLET ORAL EVERY 4 HOURS PRN
Status: CANCELLED | OUTPATIENT
Start: 2023-11-05

## 2023-11-05 RX ORDER — ONDANSETRON 8 MG/1
8 TABLET, ORALLY DISINTEGRATING ORAL EVERY 8 HOURS PRN
Status: CANCELLED | OUTPATIENT
Start: 2023-11-05

## 2023-11-05 RX ORDER — POLYETHYLENE GLYCOL 3350 17 G/17G
17 POWDER, FOR SOLUTION ORAL DAILY
Status: CANCELLED | OUTPATIENT
Start: 2023-11-06

## 2023-11-05 RX ORDER — PREGABALIN 50 MG/1
50 CAPSULE ORAL
Status: CANCELLED | OUTPATIENT
Start: 2023-11-05

## 2023-11-05 RX ORDER — SODIUM CHLORIDE 0.9 % (FLUSH) 0.9 %
3 SYRINGE (ML) INJECTION
Status: CANCELLED | OUTPATIENT
Start: 2023-11-05

## 2023-11-05 RX ORDER — ACETAMINOPHEN 500 MG
1000 TABLET ORAL
Status: CANCELLED | OUTPATIENT
Start: 2023-11-05

## 2023-11-05 RX ORDER — FAMOTIDINE 20 MG/1
20 TABLET, FILM COATED ORAL
Status: CANCELLED | OUTPATIENT
Start: 2023-11-05

## 2023-11-05 RX ORDER — ONDANSETRON 2 MG/ML
4 INJECTION INTRAMUSCULAR; INTRAVENOUS DAILY PRN
Status: CANCELLED | OUTPATIENT
Start: 2023-11-05

## 2023-11-05 RX ORDER — MUPIROCIN 20 MG/G
OINTMENT TOPICAL
Status: CANCELLED | OUTPATIENT
Start: 2023-11-05

## 2023-11-05 RX ORDER — CELECOXIB 100 MG/1
400 CAPSULE ORAL
Status: CANCELLED | OUTPATIENT
Start: 2023-11-05

## 2023-11-05 RX ORDER — HYDROMORPHONE HYDROCHLORIDE 1 MG/ML
0.2 INJECTION, SOLUTION INTRAMUSCULAR; INTRAVENOUS; SUBCUTANEOUS EVERY 5 MIN PRN
Status: CANCELLED | OUTPATIENT
Start: 2023-11-05

## 2023-11-05 RX ORDER — DIPHENHYDRAMINE HYDROCHLORIDE 50 MG/ML
25 INJECTION INTRAMUSCULAR; INTRAVENOUS EVERY 4 HOURS PRN
Status: CANCELLED | OUTPATIENT
Start: 2023-11-05

## 2023-11-05 RX ORDER — DIPHENHYDRAMINE HCL 25 MG
25 CAPSULE ORAL EVERY 4 HOURS PRN
Status: CANCELLED | OUTPATIENT
Start: 2023-11-05

## 2023-11-06 NOTE — TELEPHONE ENCOUNTER
Refill Routing Note   Medication(s) are not appropriate for processing by [facility] for the following reason(s):      Medication outside of protocol: non-delegated    [facility] action(s):  Route Care Due:  None identified   Medication Therapy Plan:         Appointments  past 12m or future 3m with PCP    Date Provider   Last Visit   10/26/2023 Juanis Chauhan MD   Next Visit   Visit date not found Juanis Chauhan MD   ED visits in past 90 days: 1        Note composed:8:19 AM 11/06/2023

## 2023-11-07 RX ORDER — IBUPROFEN 800 MG/1
800 TABLET ORAL EVERY 8 HOURS PRN
Qty: 30 TABLET | Refills: 2 | Status: ON HOLD | OUTPATIENT
Start: 2023-11-07 | End: 2024-02-27 | Stop reason: HOSPADM

## 2024-01-04 ENCOUNTER — HOSPITAL ENCOUNTER (EMERGENCY)
Facility: HOSPITAL | Age: 34
Discharge: HOME | End: 2024-01-04
Attending: EMERGENCY MEDICINE
Payer: MEDICAID

## 2024-01-04 VITALS
TEMPERATURE: 98 F | RESPIRATION RATE: 18 BRPM | WEIGHT: 293 LBS | SYSTOLIC BLOOD PRESSURE: 139 MMHG | HEIGHT: 64 IN | HEART RATE: 81 BPM | OXYGEN SATURATION: 100 % | DIASTOLIC BLOOD PRESSURE: 77 MMHG | BODY MASS INDEX: 50.02 KG/M2

## 2024-01-04 DIAGNOSIS — J06.9 VIRAL URI WITH COUGH: Primary | ICD-10-CM

## 2024-01-04 LAB
B-HCG UR QL: NEGATIVE
CTP QC/QA: YES
GROUP A STREP, MOLECULAR: NEGATIVE
INFLUENZA A, MOLECULAR: NEGATIVE
INFLUENZA B, MOLECULAR: NEGATIVE
SARS-COV-2 RDRP RESP QL NAA+PROBE: NEGATIVE
SPECIMEN SOURCE: NORMAL

## 2024-01-04 PROCEDURE — 81025 URINE PREGNANCY TEST: CPT | Mod: ER | Performed by: EMERGENCY MEDICINE

## 2024-01-04 PROCEDURE — U0002 COVID-19 LAB TEST NON-CDC: HCPCS | Mod: ER | Performed by: EMERGENCY MEDICINE

## 2024-01-04 PROCEDURE — 87502 INFLUENZA DNA AMP PROBE: CPT | Mod: ER | Performed by: EMERGENCY MEDICINE

## 2024-01-04 PROCEDURE — 99283 EMERGENCY DEPT VISIT LOW MDM: CPT | Mod: ER

## 2024-01-04 PROCEDURE — 87651 STREP A DNA AMP PROBE: CPT | Mod: ER | Performed by: EMERGENCY MEDICINE

## 2024-01-04 RX ORDER — FLUTICASONE PROPIONATE 50 MCG
1 SPRAY, SUSPENSION (ML) NASAL 2 TIMES DAILY PRN
Qty: 15 G | Refills: 0 | Status: SHIPPED | OUTPATIENT
Start: 2024-01-04 | End: 2024-01-14

## 2024-01-04 RX ORDER — BENZONATATE 100 MG/1
200 CAPSULE ORAL 3 TIMES DAILY PRN
Qty: 20 CAPSULE | Refills: 0 | Status: SHIPPED | OUTPATIENT
Start: 2024-01-04 | End: 2024-01-14

## 2024-01-04 RX ORDER — CETIRIZINE HYDROCHLORIDE 10 MG/1
10 TABLET ORAL DAILY
Qty: 10 TABLET | Refills: 0 | Status: SHIPPED | OUTPATIENT
Start: 2024-01-04 | End: 2024-01-14

## 2024-01-05 NOTE — ED PROVIDER NOTES
Patient:   ABHAY KELLER            MRN: [id number]            FIN: [id number]              Age:   42 years     Sex:  FEMALE     :  76   Associated Diagnoses:   None   Author:   CHERYLE CARRANZA     Hospitalist Discharge Summary  DISCHARGE DIAGNOSIS: Acute pyelonephritis with sepsis  HOSPITAL COURSE:   42-year-old female with history of diabetes, hypertension, obesity, LATANYA, GERD, endometriosis, chronic back pain who presented on 3/19 for suspected pyelonephritis.  #Acute pyelonephritis  #Sepsis on admission  Left flank pain, UA +, leukocytosis, fever, tachycardia. Suprapubic abdominal pain. CT A/P otherwise unremarkable. Spiked fevers initially but was afebrile for 24 hours prior to discharge.  - will continue IV ceftriaxone --> discharged on cephalexin to complete total of 14 day course  - f/u urine cultures - pan-sensitive >100K E coli (R to ampicillin)  - f/u blood cultures 3/19 - NGTD  - toradol 30 q6h x 5 days maximum  - tramadol 50 q6h prn for severe pain  - heat pad to low back  - s/p  cc/hr  #Leukocytosis, resolved  WBC 14.1. 2/2 above. Up-trended but now normalized.   - treatment as above  #Acute on chronic back pain  - flexeril 5 TID prn  - toradol 30 q6h x 5 days maximum  - tramadol 50 q6h prn for severe pain  - encouraged ambulation  #LATANYA  Hgb 11.4, at baseline.  - iron supplements BID  #Tachycardia, resolved  Likely 2/2 pain.  - monitor  #HTN  - held losartan and HCTZ in hospital  - continue amlodipine 5  #GERD  - PPI  #DM II  - held home metformin in house  - ISS, accuchecks  #Obesity  - encouraged healthy diet and exercise  Patient discharged in stable condition on 3/22/2020. PCP follow up in one week. New meds and potential side effects explained to patient.  Vitals:   Vitals between:   21-MAR-2020 10:22:49   TO   22-MAR-2020 10:22:49                   LAST RESULT MINIMUM MAXIMUM  Temperature 37 36.8 37.3  Heart Rate 99 99 112  Respiratory Rate 14 14 14  NISBP           116 116  Encounter Date: 1/4/2024       History     Chief Complaint   Patient presents with    Nasal Congestion     Pt to the ER with nasal congestion, pt reports nose bleeds sometimes because it gets dry. Onset nasal congestion & sore throat 2 days. PT used OTC throat spray.      HPI  Review of patient's allergies indicates:  No Known Allergies  Past Medical History:   Diagnosis Date    Allergy      Past Surgical History:   Procedure Laterality Date    APPENDECTOMY  2009     Family History   Problem Relation Age of Onset    No Known Problems Father     No Known Problems Mother     Breast cancer Neg Hx     Colon cancer Neg Hx     Ovarian cancer Neg Hx      Social History     Tobacco Use    Smoking status: Every Day     Types: Cigarettes, Vaping with nicotine     Start date: 3/9/2022    Smokeless tobacco: Never   Substance Use Topics    Alcohol use: Not Currently     Alcohol/week: 14.0 standard drinks of alcohol     Types: 14 Cans of beer per week    Drug use: Never     Review of Systems    Physical Exam     Initial Vitals [01/04/24 2114]   BP Pulse Resp Temp SpO2   139/77 81 18 98.4 °F (36.9 °C) 100 %      MAP       --         Physical Exam    ED Course   Procedures  Labs Reviewed   INFLUENZA A & B BY MOLECULAR   GROUP A STREP, MOLECULAR   SARS-COV-2 RNA AMPLIFICATION, QUAL    Narrative:     Is the patient symptomatic?->Yes   POCT URINE PREGNANCY          Imaging Results    None          Medications - No data to display  Medical Decision Making  Amount and/or Complexity of Data Reviewed  Labs: ordered. Decision-making details documented in ED Course.               ED Course as of 01/04/24 2226 Thu Jan 04, 2024 2212 Preg Test, Ur: Negative [CS]   2212 Group A Strep, Molecular: Negative [CS]   2226 SARS-CoV-2 RNA, Amplification, Qual: Negative [CS]   2226 Influenza A, Molecular: Negative [CS]   2226 Influenza B, Molecular: Negative [CS]      ED Course User Index  [CS] Coco Rodas PA-C                    "        Clinical Impression:   ***Please document a Clinical Impression and click the "Refresh" button to refresh your note and automatically pull in before signing.***           " 139  NIDBP           80 80 97  SpO2                    97 96 97  General: A&O x 3, in NAD  Eye: PERRLA, EOMI  HENT: Normocephalic, atraumatic. Sinuses non-tender.  Neck: Supple, non-tender.  Respiratory: CTAB, no crackles or wheezes  Cardiovascular: RRR, no murmurs, normal S1 and S2  GI: Soft, suprapubic abdominal pain, non-distended, normoactive bowel sounds. No masses.  MSK: Normal ROM, no swelling. TTP along paraspinal regions cervical - lumbar, as well as bilateral flank tenderness.  Skin: Warm, dry, pink. No lesions or rashes.  Neuro: A&O x 3, CN II - XII grossly intact, normal strength  Cognition and speech: Speech clear and coherent.  Psychiatric: Cooperative, normal affect   Labs:   Labs between:  21-MAR-2020 10:22 to 22-MAR-2020 10:22  POC GLU:                 Latest Result  Latest Date  Minimum  Min Date  Maximum  Max Date                             95 22-MAR-2020 (L) 63  21-MAR-2020 88 21-MAR-2020                 Radiology results:  Result title:  CT ABDOMEN AND PELVIS WO CON  Result status:  Final  Verified by:  NELLIE ARCE on 03/19/2020 4:37  IMPRESSION:1.   No acute abnormality within the abdomen or pelvis.2.  Mild sigmoid diverticulosis.  Echocardiogram Results  No Results Have Been Found  PROCEDURE HISTORY:  No qualifying data available.  Pending results: _    DISCHARGE MEDICATION LIST   Allergies:    There are unreconciled medications on this patient. Discharge medication reconciliation must be completed  before the Discharge medication list will print for this patient. Unreconciled medications listed below:  Order Id  ,  Order Name  ,  Medication Type   [id number]  Lactated Ringers 1,000 mL  Inpatient Order   Primary Care Physician    Physician Name:  Clinton notified  No Consulting Physicians.  Follow-up instructions:  I spent 35 minutes completing this patient's discharge.

## 2024-01-05 NOTE — ED PROVIDER NOTES
ED Provider Note - 1/4/2024    History     Chief Complaint   Patient presents with    Nasal Congestion     Pt to the ER with nasal congestion, pt reports nose bleeds sometimes because it gets dry. Onset nasal congestion & sore throat 2 days. PT used OTC throat spray.      Patient currently presents with concern regarding viral symptoms.  Onset noted 2 days ago.  Symptoms include congestion, cough, rhinorrhea, and sore throat.  Patient/family denies associated SOB.  Patient/family reports no GI symptoms associated with this process.  Denies nausea, vomiting, and diarrhea  Patient/family is aware of recent ill contacts with similar symptoms at work.        Review of patient's allergies indicates:  No Known Allergies  Past Medical History:   Diagnosis Date    Allergy      Past Surgical History:   Procedure Laterality Date    APPENDECTOMY  2009     Family History   Problem Relation Age of Onset    No Known Problems Father     No Known Problems Mother     Breast cancer Neg Hx     Colon cancer Neg Hx     Ovarian cancer Neg Hx      Social History     Tobacco Use    Smoking status: Every Day     Types: Cigarettes, Vaping with nicotine     Start date: 3/9/2022    Smokeless tobacco: Never   Substance Use Topics    Alcohol use: Not Currently     Alcohol/week: 14.0 standard drinks of alcohol     Types: 14 Cans of beer per week    Drug use: Never     Review of Systems   Constitutional:  Positive for fatigue. Negative for chills and fever.   HENT:  Positive for congestion, postnasal drip, rhinorrhea and sore throat.    Respiratory:  Positive for cough. Negative for shortness of breath.    Cardiovascular:  Negative for chest pain and palpitations.   Gastrointestinal:  Negative for abdominal pain, diarrhea and vomiting.   Genitourinary:  Negative for difficulty urinating and dysuria.   Skin:  Negative for color change and rash.   Neurological:  Negative for dizziness and light-headedness.   Hematological:  Negative for adenopathy.  Does not bruise/bleed easily.       Physical Exam     Initial Vitals [01/04/24 2114]   BP Pulse Resp Temp SpO2   139/77 81 18 98.4 °F (36.9 °C) 100 %      MAP       --           Physical Exam    Nursing note and vitals reviewed.  Constitutional: She appears well-developed and well-nourished. She is not diaphoretic. No distress.   HENT:   Head: Normocephalic and atraumatic.   Right Ear: External ear normal.   Left Ear: External ear normal.   Nose: Rhinorrhea present.   Mouth/Throat: Uvula is midline. Posterior oropharyngeal erythema present. No oropharyngeal exudate or posterior oropharyngeal edema.   Eyes: Conjunctivae are normal. No scleral icterus.   Cardiovascular:  Normal rate, regular rhythm, normal heart sounds and intact distal pulses.           Pulmonary/Chest: Breath sounds normal. No respiratory distress.   Abdominal: Abdomen is soft. She exhibits no distension. There is no abdominal tenderness.   Musculoskeletal:         General: No edema. Normal range of motion.     Neurological: She is alert and oriented to person, place, and time. She has normal strength.   Skin: Skin is warm and dry.         ED Course   Procedures                   MDM  Differential Diagnoses   Based on available history, the working differential diagnoses considered during this evaluation include but are not limited to viral syndrome including influenza and Covid 19, bronchitis, pneumonia, and sinusitis.      LABS     Labs Reviewed   INFLUENZA A & B BY MOLECULAR   GROUP A STREP, MOLECULAR   SARS-COV-2 RNA AMPLIFICATION, QUAL    Narrative:     Is the patient symptomatic?->Yes   POCT URINE PREGNANCY           All available results from the labs ordered were independently reviewed. with findings as follows: Flu screen, Covid screen, and strep screen negative     Imaging     Imaging Results    None                EKG        ED Management/Discussion   Medications - No data to display               The patient's list of active medical  problems, social history, medications, and allergies as documented per RN staff has been reviewed.             All findings were reviewed in detail along with the diagnosis of a respiratory virus.  Patient/family has been counseled regarding use of an appropriate antihistamine and expectorant/antitussive agents for symptomatic relief along with nasal steroid formulations pending resolution and PCP follow-up.    On final assessment, the patient appears suitable for discharge.  I see no indication of an emergent process beyond that addressed during our encounter but have duly counseled the patient/family regarding the need for prompt follow-up as well as the indications that should prompt immediate return to the emergency room.  The patient/family has been provided with language -specific verbal and printed direction regarding our final diagnosis(es) as well as instructions regarding use of OTC and/or Rx medications intended to manage the patient's aforementioned conditions including:  ED Prescriptions       Medication Sig Dispense Start Date End Date Auth. Provider    cetirizine (ZYRTEC) 10 MG tablet Take 1 tablet (10 mg total) by mouth once daily. for 10 days 10 tablet 1/4/2024 1/14/2024 Gregory Lizarraga MD    benzonatate (TESSALON) 100 MG capsule Take 2 capsules (200 mg total) by mouth 3 (three) times daily as needed for Cough. 20 capsule 1/4/2024 1/14/2024 Gregory Lizarraga MD    fluticasone propionate (FLONASE) 50 mcg/actuation nasal spray 1 spray (50 mcg total) by Each Nostril route 2 (two) times daily as needed for Rhinitis. 15 g 1/4/2024 1/14/2024 Gregory Lizarraga MD              Patient has been advised of the following recommended follow-up instructions:  Follow-up Information       Follow up With Specialties Details Why Contact Info    Lyn Neville MD Family Medicine Schedule an appointment as soon as possible for a visit  for reassessment [address]  [id number] [phone]      [facility] - Emergency Dept Emergency Medicine Go to  As needed, If symptoms worsen [address] [facility] [id number] [phone]          The patient/family communicates understanding of all this information and all remaining questions and concerns were addressed at this time.      Referrals:  No orders of the defined types were placed in this encounter.      CLINICAL IMPRESSION    ICD-10-CM ICD-9-CM   1. Viral URI with cough  J06.9 465.9          ED Disposition Condition    Discharge Stable                 Gregory Lizarraga MD  01/05/24 0233

## 2024-01-05 NOTE — ED NOTES
CC:  Nasal Congestion (Pt to the ER with nasal congestion, pt reports nose bleeds sometimes because it gets dry. Onset nasal congestion & sore throat 2 days. PT used OTC throat spray. )     Physical Assessment    LOC: The patient is awake, alert and aware of environment with an appropriate affect, the patient is oriented x 3 and speaking appropriately.     Psych: Patient is calm and cooperative with good eye contact.    APPEARANCE: Patient is clean and non toxic appearing    NEUROLOGIC:  Follows commands without difficulty. Speech is clear. No neuro deficits observed.    HEENT: Moist mucus membranes, ENT complaints - sore throat and nasal congestion    RESPIRATORY: Airway is open and patent, respirations are spontaneous; patient has a normal effort and rate.     CARDIAC: Patient has a normal rate and rhythm.    GI/ : Soft and non tender.     MUSCULOSKELETAL:  Normal range of motion noted. Moves all extremities well, no c/o pain, no deformity noted.    SKIN: The skin is warm, dry and intact. Patient has normal skin turgor. No rashes or lesions. No Breakdown noted.

## 2024-02-23 ENCOUNTER — TELEPHONE (OUTPATIENT)
Dept: OBSTETRICS AND GYNECOLOGY | Facility: CLINIC | Age: 34
End: 2024-02-23
Payer: MEDICAID

## 2024-02-26 ENCOUNTER — LAB VISIT (OUTPATIENT)
Dept: LAB | Facility: HOSPITAL | Age: 34
End: 2024-02-26
Attending: OBSTETRICS & GYNECOLOGY
Payer: MEDICAID

## 2024-02-26 ENCOUNTER — OFFICE VISIT (OUTPATIENT)
Dept: OBSTETRICS AND GYNECOLOGY | Facility: CLINIC | Age: 34
End: 2024-02-26
Payer: MEDICAID

## 2024-02-26 VITALS — WEIGHT: 293 LBS | BODY MASS INDEX: 50.78 KG/M2 | DIASTOLIC BLOOD PRESSURE: 98 MMHG | SYSTOLIC BLOOD PRESSURE: 145 MMHG

## 2024-02-26 DIAGNOSIS — Z01.818 PREOPERATIVE EXAM FOR GYNECOLOGIC SURGERY: ICD-10-CM

## 2024-02-26 DIAGNOSIS — Z01.818 PREOPERATIVE EXAM FOR GYNECOLOGIC SURGERY: Primary | ICD-10-CM

## 2024-02-26 LAB
ABO + RH BLD: NORMAL
BASOPHILS # BLD AUTO: 0.03 K/UL (ref 0–0.2)
BASOPHILS NFR BLD: 0.5 % (ref 0–1.9)
BLD GP AB SCN CELLS X3 SERPL QL: NORMAL
DIFFERENTIAL METHOD BLD: ABNORMAL
EOSINOPHIL # BLD AUTO: 0 K/UL (ref 0–0.5)
EOSINOPHIL NFR BLD: 0.2 % (ref 0–8)
ERYTHROCYTE [DISTWIDTH] IN BLOOD BY AUTOMATED COUNT: 17.4 % (ref 11.5–14.5)
HCT VFR BLD AUTO: 30.3 % (ref 37–48.5)
HGB BLD-MCNC: 9.2 G/DL (ref 12–16)
IMM GRANULOCYTES # BLD AUTO: 0.01 K/UL (ref 0–0.04)
IMM GRANULOCYTES NFR BLD AUTO: 0.2 % (ref 0–0.5)
LYMPHOCYTES # BLD AUTO: 2.1 K/UL (ref 1–4.8)
LYMPHOCYTES NFR BLD: 34.7 % (ref 18–48)
MCH RBC QN AUTO: 22.1 PG (ref 27–31)
MCHC RBC AUTO-ENTMCNC: 30.4 G/DL (ref 32–36)
MCV RBC AUTO: 73 FL (ref 82–98)
MONOCYTES # BLD AUTO: 0.7 K/UL (ref 0.3–1)
MONOCYTES NFR BLD: 11.6 % (ref 4–15)
NEUTROPHILS # BLD AUTO: 3.1 K/UL (ref 1.8–7.7)
NEUTROPHILS NFR BLD: 52.8 % (ref 38–73)
NRBC BLD-RTO: 0 /100 WBC
PLATELET # BLD AUTO: 483 K/UL (ref 150–450)
PMV BLD AUTO: 9.9 FL (ref 9.2–12.9)
RBC # BLD AUTO: 4.17 M/UL (ref 4–5.4)
WBC # BLD AUTO: 5.93 K/UL (ref 3.9–12.7)

## 2024-02-26 PROCEDURE — 99999 PR PBB SHADOW E&M-EST. PATIENT-LVL III: CPT | Mod: PBBFAC,,, | Performed by: OBSTETRICS & GYNECOLOGY

## 2024-02-26 PROCEDURE — 36415 COLL VENOUS BLD VENIPUNCTURE: CPT | Performed by: OBSTETRICS & GYNECOLOGY

## 2024-02-26 PROCEDURE — 85025 COMPLETE CBC W/AUTO DIFF WBC: CPT | Performed by: OBSTETRICS & GYNECOLOGY

## 2024-02-26 PROCEDURE — 99213 OFFICE O/P EST LOW 20 MIN: CPT | Mod: PBBFAC,PO | Performed by: OBSTETRICS & GYNECOLOGY

## 2024-02-26 PROCEDURE — 99499 UNLISTED E&M SERVICE: CPT | Mod: S$PBB,,, | Performed by: OBSTETRICS & GYNECOLOGY

## 2024-02-26 PROCEDURE — 86901 BLOOD TYPING SEROLOGIC RH(D): CPT | Performed by: OBSTETRICS & GYNECOLOGY

## 2024-02-26 NOTE — H&P (VIEW-ONLY)
CC: Preop exam    Ace Sheffield is a 33 y.o. female  presents for a pre-op exam for a DVH scheduled on 2024.      She reports pelvic pain a few months ago. She saw her PCP who ordered an ultrasound which showed the fibroid.      She reports cycles are regular. She uses overnight pads that she has to change twice a day. She denies heavy clots. She is anemic and is taking iron.      She is not interested in future fertility.      MRI shows:     FINDINGS:  Patient was administered 8 cc of Gadavist.     There is a 6 cm posterior uterine leiomyoma.  Endometrium is unremarkable.  There is a 2nd smaller fibroid measuring 1.8 cm.  Ovaries are unremarkable.  No adenopathy is seen.  No pedunculated fibroids are seen.  No complication seen.  There is a cervical nabothian cyst.     Impression:     6 cm and 1.8 cm uterine leiomyomas.  No complication seen.  No pedunculated fibroid seen       Past Medical History:   Diagnosis Date    Allergy        Past Surgical History:   Procedure Laterality Date    APPENDECTOMY         OB History    Para Term  AB Living   0 0 0 0 0 0   SAB IAB Ectopic Multiple Live Births   0 0 0 0 0       Family History   Problem Relation Age of Onset    No Known Problems Father     No Known Problems Mother     Breast cancer Neg Hx     Colon cancer Neg Hx     Ovarian cancer Neg Hx        Social History     Tobacco Use    Smoking status: Every Day     Types: Cigarettes, Vaping with nicotine     Start date: 3/9/2022    Smokeless tobacco: Never   Substance Use Topics    Alcohol use: Not Currently     Alcohol/week: 14.0 standard drinks of alcohol     Types: 14 Cans of beer per week    Drug use: Never       BP (!) 145/98   Wt 134.2 kg (295 lb 13.7 oz)   LMP 2024 (Exact Date)   BMI 50.78 kg/m²     Current Outpatient Medications on File Prior to Visit   Medication Sig Dispense Refill    ibuprofen (ADVIL,MOTRIN) 800 MG tablet Take 1 tablet (800 mg total) by mouth every 8  (eight) hours as needed for Pain. 30 tablet 2    lurasidone (LATUDA) 60 mg Tab tablet       minocycline (MINOCIN,DYNACIN) 100 MG capsule Take 100 mg by mouth 2 (two) times daily.      ondansetron (ZOFRAN-ODT) 4 MG TbDL Take 1 tablet (4 mg total) by mouth every 6 (six) hours as needed (vomiting). 30 tablet 0    ABILIFY MAINTENA 400 mg SSRR injection Inject 400 mg into the muscle every 28 days.      cetirizine (ZYRTEC) 10 MG tablet Take 1 tablet (10 mg total) by mouth once daily. for 10 days 10 tablet 0    spironolactone (ALDACTONE) 25 MG tablet Take by mouth.      traZODone (DESYREL) 50 MG tablet Take 50 mg by mouth every evening.       No current facility-administered medications on file prior to visit.        Review of patient's allergies indicates:  No Known Allergies     ROS:  GENERAL: Denies weight gain or weight loss. Feeling well overall.   SKIN: Denies rash or lesions.   HEAD: Denies head injury or headache.   NODES: Denies enlarged lymph nodes.   CHEST: Denies chest pain or shortness of breath.   CARDIOVASCULAR: Denies palpitations or left sided chest pain.   ABDOMEN: No abdominal pain, constipation, diarrhea, nausea, vomiting or rectal bleeding.   URINARY: No frequency, dysuria, hematuria, or burning on urination.  REPRODUCTIVE: See HPI.   HEMATOLOGIC: No easy bruisability or excessive bleeding with the exception of menstrual cycles.  MUSCULOSKELETAL: Denies joint pain or swelling.   NEUROLOGIC: Denies syncope or weakness.   PSYCHIATRIC: Denies depression, anxiety or mood swings.    PHYSICAL EXAM:  APPEARANCE: Well nourished, well developed, in no acute distress.  AFFECT: WNL, alert and oriented x 3  SKIN: No acne or hirsutism  NECK: Neck symmetric without masses or thyromegaly  NODES: No inguinal, cervical, axillary, or femoral lymph node enlargement  CHEST: Good respiratory effect  ABDOMEN: Soft.  No tenderness or masses.  No hepatosplenomegaly.  No hernias.  PELVIC: Deferred  EXTREMITIES: No edema.       ICD-10-CM ICD-9-CM    1. Preoperative exam for gynecologic surgery  Z01.818 V72.83 TYPE AND SCREEN PREOP      CBC auto differential        Patient was counseled today on treatment options for fibroids including Orhiann/Myfembree, UFE, lap RFA, myomectomy, and hysterectomy.  Patient desires a hysterectomy. Recommended ovarian conservation due to her age. Due to her body habitus, recommended robotic approach to complete the surgery quicker for less exposure to Trendelenburg position.        Surgical risks discussed including but not limited to risk of bleeding, infection, injury to adjacent organs, and laparotomy all of which are increased due to body habitus.      I have discussed the risks, benefits, indications, and alternatives of the procedure in detail.  The patient verbalizes her understanding.  All questions answered.  Consents signed.  The patient agrees to proceed to proceed as planned.

## 2024-02-27 ENCOUNTER — HOSPITAL ENCOUNTER (OUTPATIENT)
Facility: HOSPITAL | Age: 34
Discharge: HOME | End: 2024-02-27
Attending: OBSTETRICS & GYNECOLOGY | Admitting: OBSTETRICS & GYNECOLOGY
Payer: MEDICAID

## 2024-02-27 ENCOUNTER — ANESTHESIA (OUTPATIENT)
Dept: SURGERY | Facility: HOSPITAL | Age: 34
End: 2024-02-27
Payer: MEDICAID

## 2024-02-27 ENCOUNTER — ANESTHESIA EVENT (OUTPATIENT)
Dept: SURGERY | Facility: HOSPITAL | Age: 34
End: 2024-02-27
Payer: MEDICAID

## 2024-02-27 VITALS
DIASTOLIC BLOOD PRESSURE: 74 MMHG | OXYGEN SATURATION: 98 % | HEIGHT: 64 IN | SYSTOLIC BLOOD PRESSURE: 145 MMHG | RESPIRATION RATE: 18 BRPM | BODY MASS INDEX: 50.02 KG/M2 | TEMPERATURE: 98 F | HEART RATE: 79 BPM | WEIGHT: 293 LBS

## 2024-02-27 DIAGNOSIS — Z90.710 S/P LAPAROSCOPIC HYSTERECTOMY: Primary | ICD-10-CM

## 2024-02-27 DIAGNOSIS — D21.9 FIBROIDS: ICD-10-CM

## 2024-02-27 PROBLEM — E66.01 MORBID OBESITY WITH BMI OF 50.0-59.9, ADULT: Chronic | Status: ACTIVE | Noted: 2024-02-27

## 2024-02-27 LAB
B-HCG UR QL: NEGATIVE
CTP QC/QA: YES
POCT GLUCOSE: 110 MG/DL (ref 70–110)

## 2024-02-27 PROCEDURE — 71000039 HC RECOVERY, EACH ADD'L HOUR: Performed by: OBSTETRICS & GYNECOLOGY

## 2024-02-27 PROCEDURE — 71000016 HC POSTOP RECOV ADDL HR: Performed by: OBSTETRICS & GYNECOLOGY

## 2024-02-27 PROCEDURE — 25000003 PHARM REV CODE 250: Performed by: NURSE ANESTHETIST, CERTIFIED REGISTERED

## 2024-02-27 PROCEDURE — 37000009 HC ANESTHESIA EA ADD 15 MINS: Performed by: OBSTETRICS & GYNECOLOGY

## 2024-02-27 PROCEDURE — 27201423 OPTIME MED/SURG SUP & DEVICES STERILE SUPPLY: Performed by: OBSTETRICS & GYNECOLOGY

## 2024-02-27 PROCEDURE — 36415 COLL VENOUS BLD VENIPUNCTURE: CPT | Performed by: OBSTETRICS & GYNECOLOGY

## 2024-02-27 PROCEDURE — 71000015 HC POSTOP RECOV 1ST HR: Performed by: OBSTETRICS & GYNECOLOGY

## 2024-02-27 PROCEDURE — D9220A PRA ANESTHESIA: Mod: CRNA,,, | Performed by: NURSE ANESTHETIST, CERTIFIED REGISTERED

## 2024-02-27 PROCEDURE — 36000713 HC OR TIME LEV V EA ADD 15 MIN: Performed by: OBSTETRICS & GYNECOLOGY

## 2024-02-27 PROCEDURE — 25000003 PHARM REV CODE 250: Performed by: OBSTETRICS & GYNECOLOGY

## 2024-02-27 PROCEDURE — 63600175 PHARM REV CODE 636 W HCPCS: Performed by: NURSE ANESTHETIST, CERTIFIED REGISTERED

## 2024-02-27 PROCEDURE — 36000712 HC OR TIME LEV V 1ST 15 MIN: Performed by: OBSTETRICS & GYNECOLOGY

## 2024-02-27 PROCEDURE — 88307 TISSUE EXAM BY PATHOLOGIST: CPT | Mod: 26,,, | Performed by: PATHOLOGY

## 2024-02-27 PROCEDURE — 63600175 PHARM REV CODE 636 W HCPCS: Performed by: OBSTETRICS & GYNECOLOGY

## 2024-02-27 PROCEDURE — 58573 TLH W/T/O UTERUS OVER 250 G: CPT | Mod: ,,, | Performed by: OBSTETRICS & GYNECOLOGY

## 2024-02-27 PROCEDURE — D9220A PRA ANESTHESIA: Mod: ANES,,, | Performed by: UROLOGY

## 2024-02-27 PROCEDURE — 58573 TLH W/T/O UTERUS OVER 250 G: CPT | Mod: AS,,,

## 2024-02-27 PROCEDURE — 71000033 HC RECOVERY, INTIAL HOUR: Performed by: OBSTETRICS & GYNECOLOGY

## 2024-02-27 PROCEDURE — 37000008 HC ANESTHESIA 1ST 15 MINUTES: Performed by: OBSTETRICS & GYNECOLOGY

## 2024-02-27 PROCEDURE — 88307 TISSUE EXAM BY PATHOLOGIST: CPT | Performed by: PATHOLOGY

## 2024-02-27 RX ORDER — LIDOCAINE HYDROCHLORIDE 20 MG/ML
INJECTION, SOLUTION EPIDURAL; INFILTRATION; INTRACAUDAL; PERINEURAL
Status: DISCONTINUED | OUTPATIENT
Start: 2024-02-27 | End: 2024-02-27

## 2024-02-27 RX ORDER — MEPERIDINE HYDROCHLORIDE 50 MG/ML
12.5 INJECTION INTRAMUSCULAR; INTRAVENOUS; SUBCUTANEOUS ONCE AS NEEDED
Status: DISCONTINUED | OUTPATIENT
Start: 2024-02-27 | End: 2024-02-27 | Stop reason: HOSPADM

## 2024-02-27 RX ORDER — ONDANSETRON HYDROCHLORIDE 2 MG/ML
INJECTION, SOLUTION INTRAVENOUS
Status: DISCONTINUED | OUTPATIENT
Start: 2024-02-27 | End: 2024-02-27

## 2024-02-27 RX ORDER — ROCURONIUM BROMIDE 10 MG/ML
INJECTION, SOLUTION INTRAVENOUS
Status: DISCONTINUED | OUTPATIENT
Start: 2024-02-27 | End: 2024-02-27

## 2024-02-27 RX ORDER — DIPHENHYDRAMINE HYDROCHLORIDE 50 MG/ML
INJECTION INTRAMUSCULAR; INTRAVENOUS
Status: DISCONTINUED | OUTPATIENT
Start: 2024-02-27 | End: 2024-02-27

## 2024-02-27 RX ORDER — SODIUM CHLORIDE 0.9 % (FLUSH) 0.9 %
3 SYRINGE (ML) INJECTION
Status: DISCONTINUED | OUTPATIENT
Start: 2024-02-27 | End: 2024-02-27 | Stop reason: HOSPADM

## 2024-02-27 RX ORDER — SCOLOPAMINE TRANSDERMAL SYSTEM 1 MG/1
1 PATCH, EXTENDED RELEASE TRANSDERMAL ONCE
Status: DISCONTINUED | OUTPATIENT
Start: 2024-02-27 | End: 2024-02-27 | Stop reason: HOSPADM

## 2024-02-27 RX ORDER — HYDROMORPHONE HYDROCHLORIDE 2 MG/ML
INJECTION, SOLUTION INTRAMUSCULAR; INTRAVENOUS; SUBCUTANEOUS
Status: DISCONTINUED | OUTPATIENT
Start: 2024-02-27 | End: 2024-02-27

## 2024-02-27 RX ORDER — IBUPROFEN 800 MG/1
800 TABLET ORAL EVERY 8 HOURS PRN
Qty: 30 TABLET | Refills: 0 | Status: SHIPPED | OUTPATIENT
Start: 2024-02-27 | End: 2024-04-12 | Stop reason: SDUPTHER

## 2024-02-27 RX ORDER — KETAMINE HCL IN 0.9 % NACL 50 MG/5 ML
SYRINGE (ML) INTRAVENOUS
Status: DISCONTINUED | OUTPATIENT
Start: 2024-02-27 | End: 2024-02-27

## 2024-02-27 RX ORDER — PREGABALIN 50 MG/1
50 CAPSULE ORAL
Status: COMPLETED | OUTPATIENT
Start: 2024-02-27 | End: 2024-02-27

## 2024-02-27 RX ORDER — HYDROCODONE BITARTRATE AND ACETAMINOPHEN 5; 325 MG/1; MG/1
1 TABLET ORAL EVERY 6 HOURS PRN
Qty: 12 TABLET | Refills: 0 | Status: SHIPPED | OUTPATIENT
Start: 2024-02-27

## 2024-02-27 RX ORDER — ACETAMINOPHEN 500 MG
1000 TABLET ORAL
Status: COMPLETED | OUTPATIENT
Start: 2024-02-27 | End: 2024-02-27

## 2024-02-27 RX ORDER — PROCHLORPERAZINE EDISYLATE 5 MG/ML
5 INJECTION INTRAMUSCULAR; INTRAVENOUS EVERY 10 MIN PRN
Status: DISCONTINUED | OUTPATIENT
Start: 2024-02-27 | End: 2024-02-27 | Stop reason: HOSPADM

## 2024-02-27 RX ORDER — FENTANYL CITRATE 50 UG/ML
INJECTION, SOLUTION INTRAMUSCULAR; INTRAVENOUS
Status: DISCONTINUED | OUTPATIENT
Start: 2024-02-27 | End: 2024-02-27

## 2024-02-27 RX ORDER — BUPIVACAINE HYDROCHLORIDE 2.5 MG/ML
INJECTION, SOLUTION INFILTRATION; PERINEURAL
Status: DISCONTINUED | OUTPATIENT
Start: 2024-02-27 | End: 2024-02-27 | Stop reason: HOSPADM

## 2024-02-27 RX ORDER — SODIUM CHLORIDE, SODIUM LACTATE, POTASSIUM CHLORIDE, CALCIUM CHLORIDE 600; 310; 30; 20 MG/100ML; MG/100ML; MG/100ML; MG/100ML
INJECTION, SOLUTION INTRAVENOUS CONTINUOUS
Status: DISCONTINUED | OUTPATIENT
Start: 2024-02-27 | End: 2024-02-27 | Stop reason: HOSPADM

## 2024-02-27 RX ORDER — FAMOTIDINE 20 MG/1
20 TABLET, FILM COATED ORAL
Status: COMPLETED | OUTPATIENT
Start: 2024-02-27 | End: 2024-02-27

## 2024-02-27 RX ORDER — PROPOFOL 10 MG/ML
VIAL (ML) INTRAVENOUS
Status: DISCONTINUED | OUTPATIENT
Start: 2024-02-27 | End: 2024-02-27

## 2024-02-27 RX ORDER — MIDAZOLAM HYDROCHLORIDE 1 MG/ML
INJECTION INTRAMUSCULAR; INTRAVENOUS
Status: DISCONTINUED | OUTPATIENT
Start: 2024-02-27 | End: 2024-02-27

## 2024-02-27 RX ORDER — CELECOXIB 100 MG/1
400 CAPSULE ORAL
Status: COMPLETED | OUTPATIENT
Start: 2024-02-27 | End: 2024-02-27

## 2024-02-27 RX ORDER — DEXAMETHASONE SODIUM PHOSPHATE 4 MG/ML
INJECTION, SOLUTION INTRA-ARTICULAR; INTRALESIONAL; INTRAMUSCULAR; INTRAVENOUS; SOFT TISSUE
Status: DISCONTINUED | OUTPATIENT
Start: 2024-02-27 | End: 2024-02-27

## 2024-02-27 RX ORDER — ONDANSETRON HYDROCHLORIDE 2 MG/ML
4 INJECTION, SOLUTION INTRAVENOUS DAILY PRN
Status: DISCONTINUED | OUTPATIENT
Start: 2024-02-27 | End: 2024-02-27 | Stop reason: HOSPADM

## 2024-02-27 RX ORDER — SUCCINYLCHOLINE CHLORIDE 20 MG/ML
INJECTION INTRAMUSCULAR; INTRAVENOUS
Status: DISCONTINUED | OUTPATIENT
Start: 2024-02-27 | End: 2024-02-27

## 2024-02-27 RX ORDER — HYDROMORPHONE HYDROCHLORIDE 2 MG/ML
0.2 INJECTION, SOLUTION INTRAMUSCULAR; INTRAVENOUS; SUBCUTANEOUS EVERY 5 MIN PRN
Status: DISCONTINUED | OUTPATIENT
Start: 2024-02-27 | End: 2024-02-27 | Stop reason: HOSPADM

## 2024-02-27 RX ORDER — MUPIROCIN 20 MG/G
OINTMENT TOPICAL
Status: COMPLETED | OUTPATIENT
Start: 2024-02-27 | End: 2024-02-27

## 2024-02-27 RX ORDER — LIDOCAINE HYDROCHLORIDE 10 MG/ML
0.5 INJECTION, SOLUTION EPIDURAL; INFILTRATION; INTRACAUDAL; PERINEURAL
Status: DISCONTINUED | OUTPATIENT
Start: 2024-02-27 | End: 2024-02-27 | Stop reason: HOSPADM

## 2024-02-27 RX ORDER — HYDROCODONE BITARTRATE AND ACETAMINOPHEN 5; 325 MG/1; MG/1
1 TABLET ORAL EVERY 4 HOURS PRN
Status: DISCONTINUED | OUTPATIENT
Start: 2024-02-27 | End: 2024-02-27 | Stop reason: HOSPADM

## 2024-02-27 RX ADMIN — SUCCINYLCHOLINE CHLORIDE 180 MG: 20 INJECTION, SOLUTION INTRAMUSCULAR; INTRAVENOUS at 07:02

## 2024-02-27 RX ADMIN — ROCURONIUM BROMIDE 10 MG: 10 INJECTION, SOLUTION INTRAVENOUS at 08:02

## 2024-02-27 RX ADMIN — SUGAMMADEX 400 MG: 100 INJECTION, SOLUTION INTRAVENOUS at 09:02

## 2024-02-27 RX ADMIN — Medication 10 MG: at 08:02

## 2024-02-27 RX ADMIN — FAMOTIDINE 20 MG: 20 TABLET ORAL at 06:02

## 2024-02-27 RX ADMIN — HYDROMORPHONE HYDROCHLORIDE 0.2 MG: 2 INJECTION, SOLUTION INTRAMUSCULAR; INTRAVENOUS; SUBCUTANEOUS at 09:02

## 2024-02-27 RX ADMIN — CELECOXIB 400 MG: 100 CAPSULE ORAL at 06:02

## 2024-02-27 RX ADMIN — DEXTROSE 3 G: 50 INJECTION, SOLUTION INTRAVENOUS at 07:02

## 2024-02-27 RX ADMIN — GLYCOPYRROLATE 0.2 MG: 0.2 INJECTION, SOLUTION INTRAMUSCULAR; INTRAVITREAL at 08:02

## 2024-02-27 RX ADMIN — Medication 20 MG: at 07:02

## 2024-02-27 RX ADMIN — HYDROCODONE BITARTRATE AND ACETAMINOPHEN 1 TABLET: 5; 325 TABLET ORAL at 09:02

## 2024-02-27 RX ADMIN — HYDROMORPHONE HYDROCHLORIDE 0.2 MG: 2 INJECTION INTRAMUSCULAR; INTRAVENOUS; SUBCUTANEOUS at 08:02

## 2024-02-27 RX ADMIN — SODIUM CHLORIDE, POTASSIUM CHLORIDE, SODIUM LACTATE AND CALCIUM CHLORIDE: 600; 310; 30; 20 INJECTION, SOLUTION INTRAVENOUS at 06:02

## 2024-02-27 RX ADMIN — ROCURONIUM BROMIDE 50 MG: 10 INJECTION, SOLUTION INTRAVENOUS at 07:02

## 2024-02-27 RX ADMIN — PREGABALIN 50 MG: 50 CAPSULE ORAL at 06:02

## 2024-02-27 RX ADMIN — MUPIROCIN: 20 OINTMENT TOPICAL at 06:02

## 2024-02-27 RX ADMIN — FENTANYL CITRATE 100 MCG: 50 INJECTION INTRAMUSCULAR; INTRAVENOUS at 07:02

## 2024-02-27 RX ADMIN — DIPHENHYDRAMINE HYDROCHLORIDE 25 MG: 50 INJECTION INTRAMUSCULAR; INTRAVENOUS at 08:02

## 2024-02-27 RX ADMIN — ACETAMINOPHEN 1000 MG: 500 TABLET ORAL at 06:02

## 2024-02-27 RX ADMIN — DEXAMETHASONE SODIUM PHOSPHATE 8 MG: 4 INJECTION, SOLUTION INTRA-ARTICULAR; INTRALESIONAL; INTRAMUSCULAR; INTRAVENOUS; SOFT TISSUE at 08:02

## 2024-02-27 RX ADMIN — MIDAZOLAM HYDROCHLORIDE 2 MG: 1 INJECTION INTRAMUSCULAR; INTRAVENOUS at 07:02

## 2024-02-27 RX ADMIN — ONDANSETRON 8 MG: 2 INJECTION, SOLUTION INTRAMUSCULAR; INTRAVENOUS at 08:02

## 2024-02-27 RX ADMIN — PROPOFOL 200 MG: 10 INJECTION, EMULSION INTRAVENOUS at 07:02

## 2024-02-27 RX ADMIN — SCOPALAMINE 1 PATCH: 1 PATCH, EXTENDED RELEASE TRANSDERMAL at 06:02

## 2024-02-27 RX ADMIN — HYDROMORPHONE HYDROCHLORIDE 0.4 MG: 2 INJECTION INTRAMUSCULAR; INTRAVENOUS; SUBCUTANEOUS at 09:02

## 2024-02-27 RX ADMIN — ROCURONIUM BROMIDE 20 MG: 10 INJECTION, SOLUTION INTRAVENOUS at 07:02

## 2024-02-27 RX ADMIN — LIDOCAINE HYDROCHLORIDE 100 MG: 20 INJECTION, SOLUTION EPIDURAL; INFILTRATION; INTRACAUDAL; PERINEURAL at 07:02

## 2024-02-27 RX ADMIN — HYDROMORPHONE HYDROCHLORIDE 0.4 MG: 2 INJECTION INTRAMUSCULAR; INTRAVENOUS; SUBCUTANEOUS at 08:02

## 2024-02-27 RX ADMIN — Medication 10 MG: at 07:02

## 2024-02-27 NOTE — DISCHARGE INSTRUCTIONS
BATHING:                   You may shower after your dressing is removed, but no tub baths, hot tubs, saunas or swimming until you see the doctor.    DRESSING:  Remove your bandage _____48 hours___________. If there are skin tapes over the incision, leave them in place. They will start to come off in 5-7 days.  ACTIVITY LEVEL: If you have received sedation or an anesthetic, you may feel sleepy for   several hours. Rest until you are more awake. Gradually resume your normal activities  No heavy lifting or straining, nothing over 10 lbs., like a gallon of milk.  Pelvic rest- no sex, tampons or douching until follow up or instructed by doctor.  DIET:  You may resume your home diet. If nausea is present, increase your diet gradually with fluids and bland foods.    Medications:  Pain medication should be taken only if needed and as directed. If antibiotics are prescribed, the medication should be taken until completed. You will be given an updated list of you medications.  No driving, alcoholic beverages or signing legal documents for next 24 hours or while taking pain medication    CALL THE DOCTOR:   For any obvious bleeding (some dried blood over the incision is normal).     Redness, swelling, foul smell around incision or fever over 101.  Shortness of breath, Coughing up Bloody Sputum or Pains or Swelling in your calves.  Persistent pain or nausea not relieved by medication.  If vaginal bleeding is in excess of a normal period.  Problems urinating    If any unusual problems or difficulties occur contact your doctor. If you cannot contact your doctor but feel your signs and symptoms warrant a physicians attention return to the emergency room.BATHING:                   You may shower after your dressing is removed, but no tub baths, hot tubs, saunas or swimming until you see the doctor.    DRESSING:  Remove your bandage ______48 hours__________. If there are skin tapes over the incision, leave them in place. They will  start to come off in 5-7 days.  ACTIVITY LEVEL: If you have received sedation or an anesthetic, you may feel sleepy for   several hours. Rest until you are more awake. Gradually resume your normal activities  No heavy lifting or straining, nothing over 10 lbs., like a gallon of milk.  Pelvic rest- no sex, tampons or douching until follow up or instructed by doctor.  DIET:  You may resume your home diet. If nausea is present, increase your diet gradually with fluids and bland foods.    Medications:  Pain medication should be taken only if needed and as directed. If antibiotics are prescribed, the medication should be taken until completed. You will be given an updated list of you medications.  No driving, alcoholic beverages or signing legal documents for next 24 hours or while taking pain medication    CALL THE DOCTOR:   For any obvious bleeding (some dried blood over the incision is normal).     Redness, swelling, foul smell around incision or fever over 101.  Shortness of breath, Coughing up Bloody Sputum or Pains or Swelling in your calves.  Persistent pain or nausea not relieved by medication.  If vaginal bleeding is in excess of a normal period.  Problems urinating    If any unusual problems or difficulties occur contact your doctor. If you cannot contact your doctor but feel your signs and symptoms warrant a physicians attention return to the emergency room.

## 2024-02-27 NOTE — ANESTHESIA POSTPROCEDURE EVALUATION
Anesthesia Post Evaluation    Patient: Ace Sheffield    Procedure(s) Performed: Procedure(s) (LRB):  ROBOTIC HYSTERECTOMY (N/A)  CYSTOSCOPY (N/A)    Final Anesthesia Type: general      Patient location during evaluation: PACU  Patient participation: Yes- Able to Participate  Level of consciousness: awake and alert and oriented  Post-procedure vital signs: reviewed and stable  Pain management: adequate  Airway patency: patent    PONV status at discharge: No PONV  Anesthetic complications: no      Cardiovascular status: hemodynamically stable  Respiratory status: unassisted  Hydration status: euvolemic  Follow-up not needed.              Vitals Value Taken Time   /90 02/27/24 1014   Temp 36.2 °C (97.2 °F) 02/27/24 0915   Pulse 70 02/27/24 1016   Resp 19 02/27/24 1016   SpO2 96 % 02/27/24 1016   Vitals shown include unvalidated device data.      No case tracking events are documented in the log.      Pain/Michaela Score: Pain Rating Prior to Med Admin: 5 (2/27/2024  9:57 AM)  Michaela Score: 10 (2/27/2024 10:00 AM)

## 2024-02-27 NOTE — ANESTHESIA PREPROCEDURE EVALUATION
02/27/2024  Ace Sheffield is a 33 y.o., female  with AUB from uterine fibroids for robotic hysterectomy.    Extensive psych hx on multiple medications  Hx of anesthetics in past without complications  NPO>8 hours  No food or drug allergies  Amenable to proceed with scheduled procedure    Patient Active Problem List   Diagnosis    Acne vulgaris    Tinea versicolor    Chronic left shoulder pain    Plantar fasciitis       Past Medical History:   Diagnosis Date    Allergy        ECHO: No results found for this or any previous visit.      Body mass index is 51.32 kg/m².    Tobacco Use: High Risk (2/27/2024)    Patient History     Smoking Tobacco Use: Every Day     Smokeless Tobacco Use: Never     Passive Exposure: Not on file       Social History     Substance and Sexual Activity   Drug Use Never        Alcohol Use: Not on file       Review of patient's allergies indicates:  No Known Allergies      Airway:  easy with oral airway         Pre-op Assessment    I have reviewed the Patient Summary Reports.     I have reviewed the Nursing Notes. I have reviewed the NPO Status.   I have reviewed the Medications.     Review of Systems  Anesthesia Hx:  No problems with previous Anesthesia   History of prior surgery of interest to airway management or planning:          Denies Family Hx of Anesthesia complications.    Denies Personal Hx of Anesthesia complications.                    Hematology/Oncology:       -- Anemia: Iron Deficiency Anemia Blood Loss Anemia                                    Pulmonary:      Shortness of breath  Sleep Apnea                Endocrine:        Obesity / BMI > 30, Morbid Obesity / BMI > 40  Psych:  Psychiatric History anxiety depression  Anxiety Disorder.   Depression.    Psychotic Disorder.          Physical Exam  General: Well nourished, Cooperative, Alert, Oriented and  Anxious    Airway:  Mallampati: II / I  Mouth Opening: Normal  TM Distance: Normal  Tongue: Normal  Neck ROM: Normal ROM    Dental:  Intact        Anesthesia Plan  Type of Anesthesia, risks & benefits discussed:    Anesthesia Type: Gen ETT  Intra-op Monitoring Plan: Standard ASA Monitors  Post Op Pain Control Plan: multimodal analgesia and IV/PO Opioids PRN  Induction:  IV  Airway Plan: Direct and Video, Post-Induction  Informed Consent: Informed consent signed with the Patient and all parties understand the risks and agree with anesthesia plan.  All questions answered. Patient consented to blood products? Yes  ASA Score: 3    Ready For Surgery From Anesthesia Perspective.     .

## 2024-02-27 NOTE — ANESTHESIA PROCEDURE NOTES
Intubation    Date/Time: 2/27/2024 7:17 AM    Performed by: Halima Tapia CRNA  Authorized by: Bao Umaña MD    Intubation:     Induction:  Rapid sequence induction    Intubated:  Postinduction    Mask Ventilation:  Easy with oral airway    Attempts:  1    Attempted By:  CRNA    Method of Intubation:  Video laryngoscopy    Blade:  Paul 3    Laryngeal View Grade: Grade I - full view of cords      Difficult Airway Encountered?: No      Complications:  None    Airway Device:  Oral endotracheal tube    Airway Device Size:  7.0    Style/Cuff Inflation:  Cuffed (inflated to minimal occlusive pressure)    Inflation Amount (mL):  8    Tube secured:  23    Secured at:  The teeth    Placement Verified By:  Capnometry    Complicating Factors:  None    Findings Post-Intubation:  BS equal bilateral

## 2024-02-27 NOTE — TRANSFER OF CARE
"Anesthesia Transfer of Care Note    Patient: Ace Sheffield    Procedure(s) Performed: Procedure(s) (LRB):  ROBOTIC HYSTERECTOMY (N/A)  CYSTOSCOPY (N/A)    Patient location: PACU    Anesthesia Type: general    Transport from OR: Transported from OR on 6-10 L/min O2 by face mask with adequate spontaneous ventilation    Post pain: adequate analgesia    Post assessment: no apparent anesthetic complications    Post vital signs: stable    Level of consciousness: awake    Nausea/Vomiting: no nausea/vomiting    Complications: none    Transfer of care protocol was followed      Last vitals: Visit Vitals  BP (!) 141/74   Pulse 65   Temp 36.7 °C (98.1 °F) (Skin)   Resp 16   Ht 5' 4" (1.626 m)   Wt 135.6 kg (299 lb)   LMP 02/15/2024   SpO2 100%   Breastfeeding No   BMI 51.32 kg/m²     "

## 2024-02-27 NOTE — DISCHARGE SUMMARY
[facility] ([facility])  Discharge Note  Short Stay    Procedure(s) (LRB):  ROBOTIC HYSTERECTOMY (N/A)  CYSTOSCOPY (N/A)      OUTCOME: Patient tolerated treatment/procedure well without complication and is now ready for discharge.    DISPOSITION: Home or Self Care    FINAL DIAGNOSIS:  S/P laparoscopic hysterectomy    FOLLOWUP: In clinic    DISCHARGE INSTRUCTIONS:    Discharge Procedure Orders   Notify your health care provider if you experience any of the following:  temperature >100.4     Notify your health care provider if you experience any of the following:  persistent nausea and vomiting or diarrhea     Notify your health care provider if you experience any of the following:  severe uncontrolled pain     Notify your health care provider if you experience any of the following:  redness, tenderness, or signs of infection (pain, swelling, redness, odor or green/yellow discharge around incision site)     Notify your health care provider if you experience any of the following:  difficulty breathing or increased cough     Notify your health care provider if you experience any of the following:  severe persistent headache     Notify your health care provider if you experience any of the following:  worsening rash     Notify your health care provider if you experience any of the following:  persistent dizziness, light-headedness, or visual disturbances     Notify your health care provider if you experience any of the following:  increased confusion or weakness      Juanis Blackwell MD, MAS, FACOG  Obstetrics and Gynecology

## 2024-02-27 NOTE — OP NOTE
Surgery Date: 02/27/2024     SURGEON: Juanis Chauhan    ASSISTANT: Melony Benavides PA    PREOP DIAGNOSIS:   Fibroids  Menorrhagia  Morbid obesity (BMI 51)    POSTOP DIAGNOSIS:    1. Fibroids  2. Menorrhagia  3. Morbid obesity (BMI 51)    PROCEDURES:   Robotic assisted laparoscopic hysterectomy with bilateral salpingectomy  Cystoscopy    ANESTHESIA: general    FINDINGS/KEY COMPONENTS: Uterus sounded to 8 cm. Large posterior fibroid. Normal tubes and ovaries bilaterally. Bladder intact on cystoscopy. Both ureters briskly effluxing urine    ESTIMATED BLOOD LOSS: 30 cc    COMPLICATIONS: None    IV FLUIDS: 1500 cc    URINE OUTPUT: 200 cc    PROCEDURE: Patient was taking to the operating room where general anesthesia was administered and found to be adequate.  She was prepped and draped in the dorsal lithotomy position.  A weighted sterile speculum was placed in the vagina.  The anterior lip of the cervix was grasped with a single tooth tenaculum.  The uterus was sounded to approximately 8 cm.  A stay suture of 0-Vicryl was placed at 12 o'clock and 6 o'clock on the cervix.  A BRIT manipulator was placed inside the endometrial cavity.      Gloves were changed.  A Veress needle was inserted into the umbilicus under tenting of the anterior abdominal wall.  Placement into the peritoneal cavity was confirmed via saline drop test.  The abdomen was insufflated to 15mm Hg using Carbon dioxide.  A 8 mm supraumbilical skin incision was made with the scalpel.  A 8 mm trocar was advanced through this incision.  The camera was placed through the trocar.  Excellent hemostasis was noted.  The patient was placed in deep Trendelenburg.  An 8 mm left lateral skin incision was made with a scalpel and an 8 mm trocar was advanced through this incision under visualization of the camera.  A 8 mm left lateral incision was made with the scalpel.  A 8 mm AirSeal trocar was advanced through the incision under visualization  of the camera.  An 8 mm right lateral skin incision was made with the scalpel.  An 8 mm trocar was advanced through this incision under visualization of the camera.  Excellent hemostasis was noted.  The robot was docked into place.  Instruments were placed.  The surgeon moved to the console for the procedure.      Attention was turned to the left fallopian tube. The fimbria was grasped and the tube was elevated. The mesosalpinx was cauterized to the level of the cornua. Attention was turned to the left utero-ovarian ligament.  This was cauterized and transected and carried through to the round ligament which was cauterized and transected. This was continued anteriorly to begin creating the bladder flap to the midline. The uterine vessels were skeletonized, cauterized but not transected.  Attention was turned to the right fallopian tube. The fimbria was grasped and the tube was elevated. The mesosalpinx was cauterized to the level of the cornua. Attention was turned to the right utero-ovarian ligament. This was continued anteriorly through the round ligament to continue creating the bladder flap to the midline. The bladder was dissected from the cervix via gently traction. This was cauterized and transected and carried through to the round ligament which was cauterized and transected. The uterine vessels were skeletonized, cauterized and transected. The anterior colpotomy was created.     Attention was turned to the posterior uterus. The posterior colpotomy was created and carried around to the level of the uterine vessels bilaterally.  The left uterine vessels were again cauterized and transected.  The right uterine vessels were cauterized and transected. The posterior and anterior colpotomies were connected.    Attention was turned to the vagina. The surgeon returned to the bedside. The robot was undocked and removed from the surgical field. The uterus was removed vaginally by serially grasping the uterus until it  "was small enough to be delivered through the vagina. A posterior vaginal laceration was noted. This was repaired with 2-0 Vicryl in a running, locked fashion. A moist laparotomy sponge was placed in a glove and this was placed vaginally to maintain intraperitoneal pressure.     Attention was returned to the abdomen. The robot was docked in place. The surgeon returned to the console. The vaginal cuff was reapproximated in a running fashion with 180 V-lock. A 9" V-lock was placed at each angle and sewn to the middle of the cuff. The pelvis was copiously irrigated and suctioned.  Excellent hemostasis was noted. The abdomen was inspected and found to be free of injury. The abdomen was deflated and all trocars were removed.  The skin was closed with 4-0 Monocryl in a subcuticular fashion. The incisions were injected with .25% Marcaine.    Attention was returned to the vagina. The lap in glove was removed. The vaginal cuff was palpated and found to be intact. The denson catheter was removed.  A cystoscope was advanced through the urethra.  The bladder was inspected and found to be intact.  Both ureters were seen effluxing urine briskly.  The cystoscope was removed.    Sponge, lap, and needle counts were correct x 2.  The patient was taken to the recovery room in stable condition.        "

## 2024-02-29 LAB
FINAL PATHOLOGIC DIAGNOSIS: NORMAL
GROSS: NORMAL
Lab: NORMAL

## 2024-03-07 ENCOUNTER — TELEPHONE (OUTPATIENT)
Dept: OBSTETRICS AND GYNECOLOGY | Facility: CLINIC | Age: 34
End: 2024-03-07

## 2024-03-07 ENCOUNTER — OFFICE VISIT (OUTPATIENT)
Dept: OBSTETRICS AND GYNECOLOGY | Facility: CLINIC | Age: 34
End: 2024-03-07
Payer: MEDICAID

## 2024-03-07 VITALS — SYSTOLIC BLOOD PRESSURE: 100 MMHG | BODY MASS INDEX: 50.48 KG/M2 | DIASTOLIC BLOOD PRESSURE: 68 MMHG | WEIGHT: 293 LBS

## 2024-03-07 DIAGNOSIS — Z98.890 POST-OPERATIVE STATE: Primary | ICD-10-CM

## 2024-03-07 DIAGNOSIS — N89.8 VAGINAL DISCHARGE: ICD-10-CM

## 2024-03-07 PROCEDURE — 99024 POSTOP FOLLOW-UP VISIT: CPT | Mod: ,,,

## 2024-03-07 PROCEDURE — 81514 NFCT DS BV&VAGINITIS DNA ALG: CPT

## 2024-03-07 PROCEDURE — 1160F RVW MEDS BY RX/DR IN RCRD: CPT | Mod: CPTII,,,

## 2024-03-07 PROCEDURE — 1159F MED LIST DOCD IN RCRD: CPT | Mod: CPTII,,,

## 2024-03-07 PROCEDURE — 3078F DIAST BP <80 MM HG: CPT | Mod: CPTII,,,

## 2024-03-07 PROCEDURE — 99999 PR PBB SHADOW E&M-EST. PATIENT-LVL III: CPT | Mod: PBBFAC,,,

## 2024-03-07 PROCEDURE — 99213 OFFICE O/P EST LOW 20 MIN: CPT | Mod: PBBFAC,PO

## 2024-03-07 PROCEDURE — 3074F SYST BP LT 130 MM HG: CPT | Mod: CPTII,,,

## 2024-03-07 NOTE — LETTER
March 7, 2024      [facility]  [address]    [place] [address]  Phone: [phone]       Patient: Ace Sheffield  YOB: 1990  Date of Visit: 03/07/2024    To Whom It May Concern:    Ace Sheffield had a procedure on 02/27/2024 that requires at least 4-6 weeks of recovery. She may not lift any objects over 10 lbs or exert herself until she is medically cleared by her surgeon. If you have any questions or concerns, or if I can be of further assistance, please do not hesitate to contact my office.    Sincerely,    Melony Cabrera PA-C

## 2024-03-07 NOTE — PROGRESS NOTES
CC: Postoperative visit    Ace Sheffield is a 33 y.o. female  presents for a postoperative visit s/p robotic hysterectomy on .  Her postoperative course was uncomplicated.  She is doing well postoperative. Bowel movements daily with miralax and GasX. Urinating without difficulty.    She complains of odorous, yellow discharge and noticed a sharp, deep, vaginal pain last night when shifting in bed.     Pathology showed:  Uterus, cervix and bilateral fallopian tubes weighing 340 g showing:   Secretory endometrium   Negative cervix   Multiple subserosal and intramural leiomyomas measuring up to 7.5 cm   Unremarkable left and right fallopian tubes     /68   Wt 133.4 kg (294 lb 1.5 oz)   LMP 2024 (Exact Date)   BMI 50.48 kg/m²     ROS:  GENERAL: No fever, chills, fatigability or weight loss.  VULVAR: No pain, lesions, itching.  VAGINAL: No itching, discharge, abnormal bleeding, lesions.  ABDOMEN: No abdominal pain. Denies nausea, vomiting. No diarrhea, constipation  URINARY: No incontinence, nocturia, frequency, dysuria.  CARDIOVASCULAR: No chest pain. No shortness of breath. No leg cramps.  NEUROLOGICAL: No headaches. No vision changes.    Physical Exam  APPEARANCE: Well nourished, well developed, in no acute distress.  AFFECT: WNL, alert and oriented x 3  SKIN: No acne or hirsutism  ABDOMEN: Soft.  No tenderness or masses.  No hepatosplenomegaly.  No hernias.  INCISIONS: Clean, dry, intact. Well healed. Left lateral incision shows some superficial separation, new steri strips and bandaid applied. No drainage, odor, or sign of infection.  PELVIC: Normal external genitalia without lesions.  Normal hair distribution.  Adequate perineal body, normal urethral meatus.  Gentle pelvic exam performed with speculum. Vagina moist and well rugated without lesions. Scant yellow discharge present. Cuff closed and healing.  EXTREMITIES: No edema.      1. Post-operative state        2. Vaginal discharge   Vaginosis Screen by DNA Probe          Follow up with surgeon Dr. Chauhan for final post-op and wait for clearance to resume regular activities.    Patient confirms understanding and all medical questions answered.

## 2024-03-07 NOTE — LETTER
March 7, 2024    Ace Sheffield  [address]  [place] [address]              [facility]  [address]    [place] [address]  Phone: [phone]    To Whom It May Concern:    Ms. Ace Sheffield is currently under our care. She had a procedure done on 02/27/2024 that requires on average at least 4-6 weeks for recovery. For this reason she is unable to participate in jury duty at this time.    Sincerely,    Melony Cabrera PA-C

## 2024-03-08 ENCOUNTER — PATIENT MESSAGE (OUTPATIENT)
Dept: OBSTETRICS AND GYNECOLOGY | Facility: CLINIC | Age: 34
End: 2024-03-08
Payer: MEDICAID

## 2024-03-08 ENCOUNTER — TELEPHONE (OUTPATIENT)
Dept: OBSTETRICS AND GYNECOLOGY | Facility: CLINIC | Age: 34
End: 2024-03-08
Payer: MEDICAID

## 2024-03-11 LAB
BACTERIAL VAGINOSIS DNA: NEGATIVE
CANDIDA GLABRATA DNA: NEGATIVE
CANDIDA KRUSEI DNA: NEGATIVE
CANDIDA RRNA VAG QL PROBE: NEGATIVE
T VAGINALIS RRNA GENITAL QL PROBE: NEGATIVE

## 2024-04-11 ENCOUNTER — PATIENT MESSAGE (OUTPATIENT)
Dept: OBSTETRICS AND GYNECOLOGY | Facility: CLINIC | Age: 34
End: 2024-04-11
Payer: MEDICAID

## 2024-04-12 ENCOUNTER — OFFICE VISIT (OUTPATIENT)
Dept: OBSTETRICS AND GYNECOLOGY | Facility: CLINIC | Age: 34
End: 2024-04-12
Payer: MEDICAID

## 2024-04-12 VITALS — DIASTOLIC BLOOD PRESSURE: 92 MMHG | BODY MASS INDEX: 51.12 KG/M2 | WEIGHT: 293 LBS | SYSTOLIC BLOOD PRESSURE: 130 MMHG

## 2024-04-12 DIAGNOSIS — Z09 POSTOP CHECK: Primary | ICD-10-CM

## 2024-04-12 PROBLEM — Z90.710 S/P LAPAROSCOPIC HYSTERECTOMY: Status: RESOLVED | Noted: 2024-02-27 | Resolved: 2024-04-12

## 2024-04-12 PROCEDURE — 1159F MED LIST DOCD IN RCRD: CPT | Mod: CPTII,,, | Performed by: OBSTETRICS & GYNECOLOGY

## 2024-04-12 PROCEDURE — 3075F SYST BP GE 130 - 139MM HG: CPT | Mod: CPTII,,, | Performed by: OBSTETRICS & GYNECOLOGY

## 2024-04-12 PROCEDURE — 99024 POSTOP FOLLOW-UP VISIT: CPT | Mod: ,,, | Performed by: OBSTETRICS & GYNECOLOGY

## 2024-04-12 PROCEDURE — 3080F DIAST BP >= 90 MM HG: CPT | Mod: CPTII,,, | Performed by: OBSTETRICS & GYNECOLOGY

## 2024-04-12 PROCEDURE — 99999 PR PBB SHADOW E&M-EST. PATIENT-LVL III: CPT | Mod: PBBFAC,,, | Performed by: OBSTETRICS & GYNECOLOGY

## 2024-04-12 PROCEDURE — 99213 OFFICE O/P EST LOW 20 MIN: CPT | Mod: PBBFAC,PO | Performed by: OBSTETRICS & GYNECOLOGY

## 2024-04-12 PROCEDURE — 1160F RVW MEDS BY RX/DR IN RCRD: CPT | Mod: CPTII,,, | Performed by: OBSTETRICS & GYNECOLOGY

## 2024-04-12 RX ORDER — IBUPROFEN 800 MG/1
800 TABLET ORAL EVERY 8 HOURS PRN
Qty: 30 TABLET | Refills: 0 | Status: SHIPPED | OUTPATIENT
Start: 2024-04-12

## 2024-04-12 NOTE — LETTER
April 12, 2024    Ace Sheffield  [address]  [place] [address]         [facility]  [address]    [place] [address]  Phone: [phone] April 12, 2024     Patient: Ace Sheffield   YOB: 1990   Date of Visit: 4/12/2024       To Whom It May Concern:    It is my medical opinion that Ace Sheffield may return to work on Friday, April 19 .    If you have any questions or concerns, please don't hesitate to call.    Sincerely,         Juanis Chauhan MD

## 2024-04-12 NOTE — PROGRESS NOTES
CC: Postoperative visit    Ace Sheffield is a 33 y.o. female  presents for a postoperative visit s/p JESSE NORRIS on 2024.  Her postoperative course was uncomplicated.  She is doing well postoperative.    Pathology showed:  Final Pathologic Diagnosis Uterus, cervix and bilateral fallopian tubes weighing 340 g showing:  Secretory endometrium  Negative cervix  Multiple subserosal and intramural leiomyomas measuring up to 7.5 cm  Unremarkable left and right fallopian tubes       BP (!) 130/92   Wt 135.1 kg (297 lb 13.5 oz)   LMP 2024 (Approximate)   BMI 51.12 kg/m²     ROS:  GENERAL: No fever, chills, fatigability or weight loss.  VULVAR: No pain, no lesions and no itching.  VAGINAL: No relaxation, no itching, no discharge, no abnormal bleeding and no lesions.  ABDOMEN: No abdominal pain. Denies nausea. Denies vomiting. No diarrhea. No constipation  BREAST: Denies pain. No lumps. No discharge.  URINARY: No incontinence, no nocturia, no frequency and no dysuria.  CARDIOVASCULAR: No chest pain. No shortness of breath. No leg cramps.  NEUROLOGICAL: No headaches. No vision changes.    Physical Exam  APPEARANCE: Well nourished, well developed, in no acute distress.  AFFECT: WNL, alert and oriented x 3  SKIN: No acne or hirsutism  ABDOMEN: Soft.  No tenderness or masses.  No hepatosplenomegaly.  No hernias.  INCISIONS: Clean, dry, intact. Well healed.   PELVIC: Normal external genitalia without lesions.  Normal hair distribution.  Adequate perineal body, normal urethral meatus.  Vagina moist and well rugated without lesions or discharge.  Vaginal cuff intact. Bimanual exam shows uterus to be surgically absent.  Adnexa without masses or tenderness.    EXTREMITIES: No edema.      1. Postop check            Patient can return to normal activities but refrain from sex for 4-weeks.  Return to clinic in 1 year for well woman exam.

## 2024-06-10 ENCOUNTER — LAB VISIT (OUTPATIENT)
Dept: LAB | Facility: HOSPITAL | Age: 34
End: 2024-06-10
Attending: PSYCHIATRY & NEUROLOGY
Payer: MEDICAID

## 2024-06-10 DIAGNOSIS — F20.0 PARANOID SCHIZOPHRENIA: Primary | ICD-10-CM

## 2024-06-10 DIAGNOSIS — Z79.899 OTHER LONG TERM (CURRENT) DRUG THERAPY: ICD-10-CM

## 2024-06-10 LAB
ALBUMIN SERPL BCP-MCNC: 4.1 G/DL (ref 3.5–5.2)
ALP SERPL-CCNC: 122 U/L (ref 38–126)
ALT SERPL W/O P-5'-P-CCNC: 29 U/L (ref 10–44)
ANION GAP SERPL CALC-SCNC: 11 MMOL/L (ref 8–16)
AST SERPL-CCNC: 36 U/L (ref 15–46)
BASOPHILS # BLD AUTO: 0.02 K/UL (ref 0–0.2)
BASOPHILS NFR BLD: 0.3 % (ref 0–1.9)
BILIRUB SERPL-MCNC: 0.7 MG/DL (ref 0.1–1)
CALCIUM SERPL-MCNC: 9.3 MG/DL (ref 8.7–10.5)
CHLORIDE SERPL-SCNC: 107 MMOL/L (ref 95–110)
CHOLEST SERPL-MCNC: 196 MG/DL (ref 120–199)
CHOLEST/HDLC SERPL: 3.3 {RATIO} (ref 2–5)
CO2 SERPL-SCNC: 20 MMOL/L (ref 23–29)
CREAT SERPL-MCNC: 0.72 MG/DL (ref 0.5–1.4)
DIFFERENTIAL METHOD BLD: ABNORMAL
EOSINOPHIL # BLD AUTO: 0 K/UL (ref 0–0.5)
EOSINOPHIL NFR BLD: 0.3 % (ref 0–8)
ERYTHROCYTE [DISTWIDTH] IN BLOOD BY AUTOMATED COUNT: 17.3 % (ref 11.5–14.5)
EST. GFR  (NO RACE VARIABLE): >60 ML/MIN/1.73 M^2
ESTIMATED AVG GLUCOSE: 108 MG/DL (ref 68–131)
GLUCOSE SERPL-MCNC: 88 MG/DL (ref 70–110)
HBA1C MFR BLD: 5.4 % (ref 4–5.6)
HCT VFR BLD AUTO: 35.3 % (ref 37–48.5)
HDLC SERPL-MCNC: 59 MG/DL (ref 40–75)
HDLC SERPL: 30.1 % (ref 20–50)
HGB BLD-MCNC: 10.6 G/DL (ref 12–16)
IMM GRANULOCYTES # BLD AUTO: 0.01 K/UL (ref 0–0.04)
IMM GRANULOCYTES NFR BLD AUTO: 0.2 % (ref 0–0.5)
LDLC SERPL CALC-MCNC: 119.2 MG/DL (ref 63–159)
LYMPHOCYTES # BLD AUTO: 2.6 K/UL (ref 1–4.8)
LYMPHOCYTES NFR BLD: 40.4 % (ref 18–48)
MCH RBC QN AUTO: 23.2 PG (ref 27–31)
MCHC RBC AUTO-ENTMCNC: 30 G/DL (ref 32–36)
MCV RBC AUTO: 77 FL (ref 82–98)
MONOCYTES # BLD AUTO: 0.6 K/UL (ref 0.3–1)
MONOCYTES NFR BLD: 9.1 % (ref 4–15)
NEUTROPHILS # BLD AUTO: 3.2 K/UL (ref 1.8–7.7)
NEUTROPHILS NFR BLD: 49.7 % (ref 38–73)
NONHDLC SERPL-MCNC: 137 MG/DL
NRBC BLD-RTO: 0 /100 WBC
PLATELET # BLD AUTO: 349 K/UL (ref 150–450)
PMV BLD AUTO: 10.5 FL (ref 9.2–12.9)
POTASSIUM SERPL-SCNC: 4 MMOL/L (ref 3.5–5.1)
PROT SERPL-MCNC: 8 G/DL (ref 6–8.4)
RBC # BLD AUTO: 4.56 M/UL (ref 4–5.4)
SODIUM SERPL-SCNC: 138 MMOL/L (ref 136–145)
TRIGL SERPL-MCNC: 89 MG/DL (ref 30–150)
UUN UR-MCNC: 15 MG/DL (ref 7–17)
WBC # BLD AUTO: 6.51 K/UL (ref 3.9–12.7)

## 2024-06-10 PROCEDURE — 85025 COMPLETE CBC W/AUTO DIFF WBC: CPT | Mod: PN | Performed by: PSYCHIATRY & NEUROLOGY

## 2024-06-10 PROCEDURE — 83036 HEMOGLOBIN GLYCOSYLATED A1C: CPT | Performed by: PSYCHIATRY & NEUROLOGY

## 2024-06-10 PROCEDURE — 80053 COMPREHEN METABOLIC PANEL: CPT | Mod: PN | Performed by: PSYCHIATRY & NEUROLOGY

## 2024-06-10 PROCEDURE — 80061 LIPID PANEL: CPT | Performed by: PSYCHIATRY & NEUROLOGY

## 2024-06-10 PROCEDURE — 36415 COLL VENOUS BLD VENIPUNCTURE: CPT | Mod: PN | Performed by: PSYCHIATRY & NEUROLOGY

## 2024-06-26 ENCOUNTER — HOSPITAL ENCOUNTER (OUTPATIENT)
Dept: RADIOLOGY | Facility: HOSPITAL | Age: 34
Discharge: HOME | End: 2024-06-26
Payer: MEDICAID

## 2024-06-26 ENCOUNTER — OFFICE VISIT (OUTPATIENT)
Dept: FAMILY MEDICINE | Facility: HOSPITAL | Age: 34
End: 2024-06-26
Payer: MEDICAID

## 2024-06-26 VITALS
WEIGHT: 293 LBS | HEART RATE: 87 BPM | BODY MASS INDEX: 50.02 KG/M2 | SYSTOLIC BLOOD PRESSURE: 110 MMHG | HEIGHT: 64 IN | DIASTOLIC BLOOD PRESSURE: 68 MMHG

## 2024-06-26 DIAGNOSIS — M25.512 CHRONIC LEFT SHOULDER PAIN: ICD-10-CM

## 2024-06-26 DIAGNOSIS — G89.29 CHRONIC LEFT SHOULDER PAIN: ICD-10-CM

## 2024-06-26 DIAGNOSIS — Z09 POSTOP CHECK: ICD-10-CM

## 2024-06-26 DIAGNOSIS — G89.29 CHRONIC LEFT SHOULDER PAIN: Primary | ICD-10-CM

## 2024-06-26 DIAGNOSIS — M25.512 CHRONIC LEFT SHOULDER PAIN: Primary | ICD-10-CM

## 2024-06-26 PROCEDURE — 73030 X-RAY EXAM OF SHOULDER: CPT | Mod: 26,LT,, | Performed by: RADIOLOGY

## 2024-06-26 PROCEDURE — 73030 X-RAY EXAM OF SHOULDER: CPT | Mod: TC,FY,LT

## 2024-06-26 PROCEDURE — 99214 OFFICE O/P EST MOD 30 MIN: CPT | Mod: 25

## 2024-06-26 RX ORDER — IBUPROFEN 800 MG/1
800 TABLET ORAL EVERY 8 HOURS PRN
Qty: 30 TABLET | Refills: 0 | Status: SHIPPED | OUTPATIENT
Start: 2024-06-26

## 2024-06-26 RX ORDER — DICLOFENAC SODIUM 10 MG/G
2 GEL TOPICAL 4 TIMES DAILY
Qty: 100 G | Refills: 1 | Status: SHIPPED | OUTPATIENT
Start: 2024-06-26 | End: 2024-09-24

## 2024-06-26 NOTE — PROGRESS NOTES
"[facility]    Subjective:     Ace Sheffield is a 34 y.o. year old female with PMHx of paranoid schizophrenia who presents to clinic for follow up and c/o left shoulder pain.     C/o left shoulder pain. Has been going on since 2022. However, has recently come back and gotten worse. She describes the pain as "being in her bones" when asked where. Points to anterior aspect of shoulder, localized. Does not radiate. No numbness or tingling. Worse w/ overhead movements. Works in a [employer] and has to  heavy objects.Denies any weakness causing her to drop objects. She has been taking Ibuprofen 800 mg once a day at most for severe pain with some relief. She has not tried anything else. She has been to PT in the past but she does not feel like it helped much. She also has hx of injections in that shoulder, but thinks it is maybe her psych medication. She is unsure.    Patient Active Problem List    Diagnosis Date Noted    Morbid obesity with BMI of 50.0-59.9, adult 02/27/2024    Chronic left shoulder pain 12/05/2022    Plantar fasciitis 12/07/2021    Acne vulgaris 09/13/2016    Tinea versicolor 09/13/2016        Review of patient's allergies indicates:  No Known Allergies     Past Medical History:   Diagnosis Date    Allergy       Past Surgical History:   Procedure Laterality Date    APPENDECTOMY  2009    CYSTOSCOPY N/A 2/27/2024    Procedure: CYSTOSCOPY;  Surgeon: Juanis Chauhan MD;  Location: [facility] OR;  Service: OB/GYN;  Laterality: N/A;    ROBOT-ASSISTED LAPAROSCOPIC HYSTERECTOMY N/A 2/27/2024    Procedure: ROBOTIC HYSTERECTOMY;  Surgeon: Juanis Chauhan MD;  Location: [facility] OR;  Service: OB/GYN;  Laterality: N/A;      Family History   Problem Relation Name Age of Onset    No Known Problems Father      No Known Problems Mother      Breast cancer Neg Hx      Colon cancer Neg Hx      Ovarian cancer Neg Hx        Social History     Tobacco Use    Smoking status: Every Day     " "Types: Cigarettes, Vaping with nicotine     Start date: 3/9/2022    Smokeless tobacco: Never   Substance Use Topics    Alcohol use: Not Currently     Alcohol/week: 14.0 standard drinks of alcohol     Types: 14 Cans of beer per week        Objective:     Vitals:    06/26/24 1504   BP: 110/68   Pulse: 87   Weight: (!) 138.1 kg (304 lb 7.3 oz)   Height: 5' 4" (1.626 m)       Body mass index is 52.26 kg/m².    Physical Exam  Vitals reviewed.   Constitutional:       General: She is not in acute distress.     Appearance: She is not toxic-appearing.   HENT:      Mouth/Throat:      Mouth: Mucous membranes are moist.      Pharynx: Oropharynx is clear.   Eyes:      Extraocular Movements: Extraocular movements intact.   Cardiovascular:      Rate and Rhythm: Normal rate and regular rhythm.   Pulmonary:      Effort: Pulmonary effort is normal. No respiratory distress.      Breath sounds: Normal breath sounds.   Musculoskeletal:      Right shoulder: Normal. No swelling, deformity, tenderness or bony tenderness. Normal range of motion. Normal strength.      Left shoulder: No swelling, deformity, tenderness or bony tenderness. Normal range of motion. Normal strength.      Comments: Left shoulder with no bony or muscle tenderness to palpation. Full ROM w/ flexion, extension, abduction, adduction, internal and external rotation. Negative Neer's, Empty Can, Drop arm, Speed's test. Positive Walls Test. Mild pain with overhead.   Skin:     General: Skin is warm.   Neurological:      Mental Status: She is alert. Mental status is at baseline.   Psychiatric:         Mood and Affect: Affect is blunt.         Speech: Speech is delayed.         Behavior: Behavior is cooperative.         Assessment/Plan:     Ace Sheffield is a 34 y.o. year old female who presents to clinic for follow up and c/o left shoulder pain.    1. Acute on chronic left shoulder pain  - Suspect likely 2/2 shoulder impingement vs muscle strain though unclear as pt's " exam not consistent with a specific etiology  - X-Ray Shoulder Trauma 3 view Left to r/o any fracture, dislocation, or early arthritis given worsening   - Started patient on diclofenac sodium (VOLTAREN) 1 % Gel; Apply 2 g topically 4 (four) times daily.  Dispense: 100 g; Refill: 1  - Discussed PT but patient declined. Printed out handout with exercises for patient to do at home.   - Discussed importance of weight loss in helping reduce strain on her joints given BMI of 52. Counseled patient on importance of exercising at least 150-300 minutes per week (i.e. at least 30 minutes, 3 days a week) of moderate physical activity.     Follow-up: 1 month if not improving    A total of 40 minutes was spent on patient care during this encounter which included chart review, examining the patient, formulating a treatment plan and documentation.     Medical decision making straight forward and not complex during this visit.     Case discussed with staff: Dr. Mario Neville MD  [facility], PGY-2  06/26/2024

## 2024-07-08 ENCOUNTER — HOSPITAL ENCOUNTER (EMERGENCY)
Facility: HOSPITAL | Age: 34
Discharge: HOME | End: 2024-07-08
Attending: FAMILY MEDICINE
Payer: MEDICAID

## 2024-07-08 VITALS
RESPIRATION RATE: 19 BRPM | OXYGEN SATURATION: 98 % | SYSTOLIC BLOOD PRESSURE: 133 MMHG | BODY MASS INDEX: 52.7 KG/M2 | HEART RATE: 77 BPM | WEIGHT: 293 LBS | TEMPERATURE: 98 F | DIASTOLIC BLOOD PRESSURE: 81 MMHG

## 2024-07-08 DIAGNOSIS — B34.9 VIRAL SYNDROME: Primary | ICD-10-CM

## 2024-07-08 LAB
GROUP A STREP, MOLECULAR: NEGATIVE
INFLUENZA A, MOLECULAR: NEGATIVE
INFLUENZA B, MOLECULAR: NEGATIVE
SARS-COV-2 RDRP RESP QL NAA+PROBE: NEGATIVE
SPECIMEN SOURCE: NORMAL

## 2024-07-08 PROCEDURE — U0002 COVID-19 LAB TEST NON-CDC: HCPCS | Mod: ER | Performed by: FAMILY MEDICINE

## 2024-07-08 PROCEDURE — 99284 EMERGENCY DEPT VISIT MOD MDM: CPT | Mod: ER

## 2024-07-08 PROCEDURE — 87502 INFLUENZA DNA AMP PROBE: CPT | Mod: ER | Performed by: FAMILY MEDICINE

## 2024-07-08 PROCEDURE — 87651 STREP A DNA AMP PROBE: CPT | Mod: ER | Performed by: FAMILY MEDICINE

## 2024-07-08 RX ORDER — NAPROXEN 500 MG/1
500 TABLET ORAL 2 TIMES DAILY WITH MEALS
Qty: 60 TABLET | Refills: 0 | Status: SHIPPED | OUTPATIENT
Start: 2024-07-08

## 2024-07-08 RX ORDER — CETIRIZINE HYDROCHLORIDE 10 MG/1
10 TABLET ORAL DAILY
Qty: 30 TABLET | Refills: 0 | Status: SHIPPED | OUTPATIENT
Start: 2024-07-08 | End: 2024-08-07

## 2024-07-08 RX ORDER — BENZONATATE 200 MG/1
200 CAPSULE ORAL 3 TIMES DAILY PRN
Qty: 30 CAPSULE | Refills: 0 | Status: SHIPPED | OUTPATIENT
Start: 2024-07-08 | End: 2024-07-18

## 2024-07-08 RX ORDER — ACETAMINOPHEN 500 MG
1000 TABLET ORAL 4 TIMES DAILY
Qty: 112 TABLET | Refills: 0 | Status: SHIPPED | OUTPATIENT
Start: 2024-07-08 | End: 2024-07-22

## 2024-07-08 NOTE — Clinical Note
"Ace Vasquezbuster Sheffield was seen and treated in our emergency department on 7/8/2024.  She may return to work on 07/10/2024.       If you have any questions or concerns, please don't hesitate to call.      Daisy Randall PA-C"

## 2024-07-08 NOTE — ED PROVIDER NOTES
Encounter Date: 7/8/2024       History     Chief Complaint   Patient presents with    cough and nasal congestion     Pt states she woke up 3 days ago with cough, congestion denies fever.      Ace Sheffield is a 34 y.o. female  has a past medical history of Allergy. presenting to the Emergency Department for Subjective fever, nasal congestion, sore throat, cough, body aches for the last 3 days.  No shortness a breath or chest pain.  Reports nausea and 1 episode of vomiting.  Patient is not currently nauseous.  No abdominal pain, diarrhea, constipation.          The history is provided by the patient.     Review of patient's allergies indicates:  No Known Allergies  Past Medical History:   Diagnosis Date    Allergy      Past Surgical History:   Procedure Laterality Date    APPENDECTOMY  2009    CYSTOSCOPY N/A 2/27/2024    Procedure: CYSTOSCOPY;  Surgeon: Juanis Chauhan MD;  Location: [facility] OR;  Service: OB/GYN;  Laterality: N/A;    ROBOT-ASSISTED LAPAROSCOPIC HYSTERECTOMY N/A 2/27/2024    Procedure: ROBOTIC HYSTERECTOMY;  Surgeon: Juanis Chauhan MD;  Location: [facility] OR;  Service: OB/GYN;  Laterality: N/A;     Family History   Problem Relation Name Age of Onset    No Known Problems Father      No Known Problems Mother      Breast cancer Neg Hx      Colon cancer Neg Hx      Ovarian cancer Neg Hx       Social History     Tobacco Use    Smoking status: Every Day     Types: Cigarettes, Vaping with nicotine     Start date: 3/9/2022    Smokeless tobacco: Never   Substance Use Topics    Alcohol use: Not Currently     Alcohol/week: 14.0 standard drinks of alcohol     Types: 14 Cans of beer per week    Drug use: Never     Review of Systems   Constitutional:  Positive for chills. Negative for fever.   HENT:  Positive for congestion, rhinorrhea and sore throat.    Respiratory:  Positive for cough. Negative for shortness of breath.    Cardiovascular:  Negative for chest pain.   Gastrointestinal:  Negative for  abdominal pain, diarrhea, nausea and vomiting.   Genitourinary:  Negative for dysuria.   Musculoskeletal:  Positive for myalgias. Negative for back pain.   Skin:  Negative for rash.   Neurological:  Negative for weakness.   Hematological:  Does not bruise/bleed easily.   All other systems reviewed and are negative.      Physical Exam     Initial Vitals [07/08/24 1053]   BP Pulse Resp Temp SpO2   133/81 77 19 98.1 °F (36.7 °C) 98 %      MAP       --         Physical Exam    Nursing note and vitals reviewed.  Constitutional: She appears well-developed and well-nourished. She is not diaphoretic. No distress.   HENT:   Head: Normocephalic.   Right Ear: Hearing, tympanic membrane and external ear normal.   Left Ear: Hearing, tympanic membrane and external ear normal.   Nose: Nose normal.   Mouth/Throat: Oropharynx is clear and moist.   Eyes: Conjunctivae, EOM and lids are normal. Pupils are equal, round, and reactive to light.   Neck: Neck supple.   Normal range of motion.  Cardiovascular:  Normal rate, regular rhythm and normal heart sounds.           Pulmonary/Chest: Breath sounds normal. No respiratory distress. She has no wheezes. She has no rhonchi.   Abdominal: Abdomen is soft. Bowel sounds are normal. There is no abdominal tenderness. There is no rebound and no guarding.   Musculoskeletal:         General: Normal range of motion.      Cervical back: Normal range of motion and neck supple. No rigidity.     Lymphadenopathy:     She has no cervical adenopathy.   Neurological: She is alert and oriented to person, place, and time. She has normal strength. GCS score is 15. GCS eye subscore is 4. GCS verbal subscore is 5. GCS motor subscore is 6.   Skin: Skin is warm and dry. Capillary refill takes less than 2 seconds. No rash noted.   Psychiatric: She has a normal mood and affect. Her behavior is normal. Judgment and thought content normal.         ED Course   Procedures  Labs Reviewed   GROUP A STREP, MOLECULAR    INFLUENZA A & B BY MOLECULAR   SARS-COV-2 RNA AMPLIFICATION, QUAL          Imaging Results    None          Medications - No data to display  Medical Decision Making  Amount and/or Complexity of Data Reviewed  Labs:  Decision-making details documented in ED Course.    Risk  OTC drugs.  Prescription drug management.      Additional MDM:   Differential Diagnosis:   Including but not limited to Sepsis, meningitis, nasal/aspirated foreign body, OM, OE, nasal polyp, bacterial sinusitis, allergic rhinitis, peritonsillar abscess, retropharyngeal abscess, epiglottitis, bacterial/viral pneumonia, bacterial/viral pharyngitis, croup, bronchiolitis, influenza, viral syndrome                ED Course as of 07/08/24 1210   Mon Jul 08, 2024   1143 Influenza A, Molecular: Negative [LH]   1143 Influenza B, Molecular: Negative [LH]   1143 SARS-CoV-2 RNA, Amplification, Qual: Negative [LH]   1143 Group A Strep, Molecular: Negative [LH]   1157 This is an emergent evaluation of a 34 y.o. female that presents to the Emergency Department for viral symptoms. The patient is a non-toxic and not acutely ill-appearing, afebrile, and well appearing female. Pertinent physical exam findings above. Appears well hydrated with moist mucus membranes. Neck soft and supple with no meningeal signs. Breath sounds are clear and equal bilaterally. No tachypnea or respiratory distress and no evidence of hypoxia or cyanosis. Vital signs are reassuring.      My overall impression is viral syndrome. Differential Diagnosis: Including but not limited to Sepsis, meningitis, nasal/aspirated foreign body, OM, OE, nasal polyp, bacterial sinusitis, allergic rhinitis, peritonsillar abscess, retropharyngeal abscess, epiglottitis, bacterial/viral pneumonia, bacterial/viral pharyngitis, croup, bronchiolitis, influenza, viral syndrome     Discharge Meds/Instructions: Supportive care, Tylenol/Ibuprofen PRN, Hydration.      There does not appear to be any indication for  further emergent testing, observation, or hospitalization at this time. A mutual shared decision making discussion was had with the patient. Patient appears stable for and is comfortable with discharge home. The diagnosis, treatment plan, instructions for follow-up as well as ED return precautions were discussed. Advised to follow-up with PCP for outpatient follow-up in 2-3 days. Signs and symptoms that would warrant immediate return to ED were reviewed prior to discharge. All questions and concerns were asked, answered, and addressed. Patient expressed understanding and agreement with the plan.  [LH]      ED Course User Index  [LH] Daisy Randall PA-C                           Clinical Impression:  Final diagnoses:  [B34.9] Viral syndrome (Primary)          ED Disposition Condition    Discharge Stable          ED Prescriptions       Medication Sig Dispense Start Date End Date Auth. Provider    acetaminophen (TYLENOL) 500 MG tablet Take 2 tablets (1,000 mg total) by mouth 4 (four) times daily. for 14 days 112 tablet 7/8/2024 7/22/2024 Daisy Randall PA-C    naproxen (NAPROSYN) 500 MG tablet Take 1 tablet (500 mg total) by mouth 2 (two) times daily with meals. 60 tablet 7/8/2024 -- Daisy Randall PA-C    cetirizine (ZYRTEC) 10 MG tablet Take 1 tablet (10 mg total) by mouth once daily. 30 tablet 7/8/2024 8/7/2024 Daisy Randall PA-C    benzonatate (TESSALON) 200 MG capsule Take 1 capsule (200 mg total) by mouth 3 (three) times daily as needed for Cough. 30 capsule 7/8/2024 7/18/2024 Daisy Randall PA-C          Follow-up Information    None          Daisy Randall PA-C  07/08/24 1210

## 2024-07-28 ENCOUNTER — TELEPHONE (OUTPATIENT)
Dept: OBSTETRICS AND GYNECOLOGY | Facility: CLINIC | Age: 34
End: 2024-07-28
Payer: MEDICAID

## 2024-07-28 NOTE — TELEPHONE ENCOUNTER
----- Message from Maricel Arnett sent at 7/25/2024 12:31 PM CDT -----  Regarding: Sf[facility]:  Medicaid Hysterectomy 96A form needed  Unable to locate the 96 Medicaid hysterectomy form in patient's chart for dos 02/27/2024.  Please scan to chart so we may re bill the claim and get paid.  I thank you, Maricel Brianisson Denials and   phone fax   / [id number] encounter #  Be Blessed

## 2024-07-29 ENCOUNTER — TELEPHONE (OUTPATIENT)
Dept: OBSTETRICS AND GYNECOLOGY | Facility: CLINIC | Age: 34
End: 2024-07-29
Payer: MEDICAID

## 2024-07-29 ENCOUNTER — PATIENT MESSAGE (OUTPATIENT)
Dept: OBSTETRICS AND GYNECOLOGY | Facility: CLINIC | Age: 34
End: 2024-07-29
Payer: MEDICAID

## 2024-07-29 NOTE — TELEPHONE ENCOUNTER
"----- Message from Aisha Toussaint sent at 7/29/2024  9:14 AM CDT -----  Patient Returning a call    Who is calling?  Patient  Who called patient?  Theodore  Call back or [facility] message?  Call back/ portal message if no answer  Call back number: [phone]  Additional info:  "Of course I can come sign it"  "

## 2024-08-06 ENCOUNTER — PATIENT MESSAGE (OUTPATIENT)
Dept: FAMILY MEDICINE | Facility: HOSPITAL | Age: 34
End: 2024-08-06
Payer: MEDICAID

## 2024-08-12 ENCOUNTER — OFFICE VISIT (OUTPATIENT)
Dept: FAMILY MEDICINE | Facility: HOSPITAL | Age: 34
End: 2024-08-12
Payer: MEDICAID

## 2024-08-12 VITALS
OXYGEN SATURATION: 100 % | SYSTOLIC BLOOD PRESSURE: 134 MMHG | RESPIRATION RATE: 16 BRPM | WEIGHT: 293 LBS | DIASTOLIC BLOOD PRESSURE: 89 MMHG | HEART RATE: 67 BPM | HEIGHT: 64 IN | BODY MASS INDEX: 50.02 KG/M2

## 2024-08-12 DIAGNOSIS — M25.512 CHRONIC LEFT SHOULDER PAIN: ICD-10-CM

## 2024-08-12 DIAGNOSIS — G89.29 CHRONIC LEFT SHOULDER PAIN: ICD-10-CM

## 2024-08-12 DIAGNOSIS — E66.01 CLASS 3 SEVERE OBESITY WITH BODY MASS INDEX (BMI) OF 50.0 TO 59.9 IN ADULT, UNSPECIFIED OBESITY TYPE, UNSPECIFIED WHETHER SERIOUS COMORBIDITY PRESENT: ICD-10-CM

## 2024-08-12 DIAGNOSIS — R09.81 NASAL CONGESTION: Primary | ICD-10-CM

## 2024-08-12 PROBLEM — E66.813 CLASS 3 SEVERE OBESITY WITH BODY MASS INDEX (BMI) OF 50.0 TO 59.9 IN ADULT: Status: ACTIVE | Noted: 2024-02-27

## 2024-08-12 PROCEDURE — 99215 OFFICE O/P EST HI 40 MIN: CPT

## 2024-08-12 RX ORDER — FLUTICASONE PROPIONATE 50 MCG
1 SPRAY, SUSPENSION (ML) NASAL DAILY
Qty: 15.8 ML | Refills: 7 | Status: SHIPPED | OUTPATIENT
Start: 2024-08-12 | End: 2025-08-12

## 2024-08-12 NOTE — PROGRESS NOTES
[facility]    Subjective:     Ace Sheffield is a 34 y.o. year old female with PMHx of paranoid schizophrenia who presents to clinic for nasal congestion.    Nasal congestion x1 month. Went to ER at that time and was COVID, Influenza, and Strep negative. C/o soreness in her left nostril. Drainage green at first, now clear. Was taking tessalon pearls and Zyrtec (but not everyday, more as PRN). Denies any fever/chills, ear pain, sore throat. Has +sick contact, cousin who recently came down with a cold. Has not tried nasal spray before.    L shoulder pain: No changes from prior evaluation (no new numbness, tingling or weakness), see last note. Discussed imaging results. Pt willing to try PT to help w/ increasing her mobility such as overhead movements.    C/o on-going fatigue, inability to sleep for long or feel refreshed. She reports she was falling asleep while waiting for me to come in the room. Has noticed that since her nose has been congested, it has been even harder for her to sleep at night. Does snore.    Patient Active Problem List    Diagnosis Date Noted    Class 3 severe obesity with body mass index (BMI) of 50.0 to 59.9 in adult 02/27/2024    Chronic left shoulder pain 12/05/2022    Plantar fasciitis 12/07/2021    Acne vulgaris 09/13/2016    Tinea versicolor 09/13/2016        Review of patient's allergies indicates:  No Known Allergies     Past Medical History:   Diagnosis Date    Allergy       Past Surgical History:   Procedure Laterality Date    APPENDECTOMY  2009    CYSTOSCOPY N/A 2/27/2024    Procedure: CYSTOSCOPY;  Surgeon: Juanis Chauhan MD;  Location: [facility] OR;  Service: OB/GYN;  Laterality: N/A;    ROBOT-ASSISTED LAPAROSCOPIC HYSTERECTOMY N/A 2/27/2024    Procedure: ROBOTIC HYSTERECTOMY;  Surgeon: Juanis Chauhan MD;  Location: [facility] OR;  Service: OB/GYN;  Laterality: N/A;      Family History   Problem Relation Name Age of Onset    No Known Problems Father      No Known  "Problems Mother      Breast cancer Neg Hx      Colon cancer Neg Hx      Ovarian cancer Neg Hx        Social History     Tobacco Use    Smoking status: Every Day     Types: Cigarettes, Vaping with nicotine     Start date: 3/9/2022    Smokeless tobacco: Never   Substance Use Topics    Alcohol use: Not Currently     Alcohol/week: 14.0 standard drinks of alcohol     Types: 14 Cans of beer per week        Objective:     Vitals:    08/12/24 0909   BP: 134/89   BP Location: Right arm   Patient Position: Sitting   BP Method: Large (Automatic)   Pulse: 67   Resp: 16   SpO2: 100%   Weight: (!) 138.1 kg (304 lb 7.3 oz)   Height: 5' 4" (1.626 m)     Body mass index is 52.26 kg/m².    Physical Exam  Vitals reviewed.   Constitutional:       General: She is not in acute distress.     Appearance: She is obese. She is not toxic-appearing.   HENT:      Right Ear: Tympanic membrane, ear canal and external ear normal.      Left Ear: Tympanic membrane, ear canal and external ear normal.      Nose: Congestion and rhinorrhea present.      Mouth/Throat:      Mouth: Mucous membranes are moist.      Pharynx: Oropharynx is clear. No oropharyngeal exudate or posterior oropharyngeal erythema.      Comments: Mallampati Class IV  Eyes:      Extraocular Movements: Extraocular movements intact.   Cardiovascular:      Rate and Rhythm: Normal rate and regular rhythm.   Pulmonary:      Effort: Pulmonary effort is normal. No respiratory distress.      Breath sounds: Normal breath sounds.   Skin:     General: Skin is warm.   Neurological:      Mental Status: She is alert. Mental status is at baseline.       Assessment/Plan:     Ace Sheffield is a 34 y.o. year old female who presents to clinic for c/o nasal congestion.    1. Nasal congestion  - Advised to continue taking Zyrtec but daily instead of PRN  - Started patient on fluticasone propionate (FLONASE) 50 mcg/actuation nasal spray; 1 spray (50 mcg total) by Each Nostril route once daily.  Dispense: " 15.8 mL; Refill: 7. Discussed risks and benefits of medication with patient. Discussed common side effects of medication. All questions answered.    2. Class 3 severe obesity with body mass index (BMI) of 50.0 to 59.9 in adult, unspecified obesity type, unspecified whether serious comorbidity present  - Given BMI >35, daytime sleepiness, snoring, and not feeling well-rested, will refer to r/o LAURI  - Ambulatory referral/consult to Sleep Disorders; Future    3. Chronic left shoulder pain  - Chronic, controlled  - XR ordered from last visit discussed w/ patient today  - Ambulatory referral/consult to Physical/Occupational Therapy; Future  - If does not improve with 4-6 weeks of PT, advised pt can consider advanced imaging at that time     Follow-up: 4-6 weeks after PT    A total of 35 minutes was spent on patient care during this encounter which included chart review, examining the patient, formulating a treatment plan and documentation.      Case discussed with staff: SADIQ Neville MD  [facility], PGY-3  08/12/2024

## 2024-08-29 ENCOUNTER — CLINICAL SUPPORT (OUTPATIENT)
Dept: REHABILITATION | Facility: HOSPITAL | Age: 34
End: 2024-08-29
Payer: MEDICAID

## 2024-08-29 DIAGNOSIS — R53.1 DECREASED STRENGTH: Primary | ICD-10-CM

## 2024-08-29 DIAGNOSIS — G89.29 CHRONIC LEFT SHOULDER PAIN: ICD-10-CM

## 2024-08-29 DIAGNOSIS — M25.512 CHRONIC LEFT SHOULDER PAIN: ICD-10-CM

## 2024-08-29 PROCEDURE — 97165 OT EVAL LOW COMPLEX 30 MIN: CPT | Mod: PO

## 2024-08-30 PROBLEM — M25.512 CHRONIC LEFT SHOULDER PAIN: Status: ACTIVE | Noted: 2024-08-30

## 2024-08-30 PROBLEM — M25.512 CHRONIC LEFT SHOULDER PAIN: Status: RESOLVED | Noted: 2022-12-05 | Resolved: 2024-08-30

## 2024-08-30 PROBLEM — G89.29 CHRONIC LEFT SHOULDER PAIN: Status: RESOLVED | Noted: 2022-12-05 | Resolved: 2024-08-30

## 2024-08-30 PROBLEM — G89.29 CHRONIC LEFT SHOULDER PAIN: Status: ACTIVE | Noted: 2024-08-30

## 2024-08-30 PROBLEM — R53.1 DECREASED STRENGTH: Status: ACTIVE | Noted: 2024-08-30

## 2024-08-30 NOTE — PLAN OF CARE
[facility]  Occupational Therapy Initial Evaluation      Name: Ace Sheffield  Clinic Number: [id number]    Therapy Diagnosis:   Encounter Diagnoses   Name Primary?    Chronic left shoulder pain     Decreased strength Yes     Physician: Lyn Neville MD    Physician Orders: Eval and Treat  Medical Diagnosis: M25.512,G89.29 (ICD-10-CM) - Chronic left shoulder pain  Surgical Procedure and Date: N/A , N/A   Evaluation Date: 2024  Insurance Authorization Period Expiration: 2025   Plan of Care Certification Period: 2024 - 10/25/2024  Date of Return to MD: none noted in chart for referring provider at this time   Visit # / Visits authorized: 1 / pending   FOTO: 1/ 3 Initial=68 / Goal=74      Precautions:  Standard    Time In: 1030   Time Out: 1115  Total Billable Time: 0 minutes    Subjective      Date of Onset: 2 months ago woke up with shoulder pain.        History of Current Condition/Mechanism of Injury: Ace reports: about 2 months ago, she woke up with shoulder pain.  She reports that it does come and go, has not worsened, but it is not going away.  She can also feel and hear a popping sound at the top of her shoulder. She has pain when laying on her left side, reaching across her body, and when trying to use the arm to push up.  When she lifts something heavier, she mostly has pain afterwards or later as if it is sore. She can not recall any injury or increased activity that may have initiated the shoulder pain.     Falls: none    Involved Side: left   Dominant Side: Right    Mechanism of Injury: no known CORDELL   Surgical Procedure: N/A   Imagin2024XR SHOULDER TRAUMA 3 VIEW LEFT  FINDINGS:  Shoulder trauma three views left.   No fracture dislocation bone destruction seen.  No acute trauma seen.     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     Current Functional Status   Home/Living environment: lives with their family     - 1 story home, thresh hold to enter    - DME: N/A       Limitation of Functional Status as follows:   ADLs/IADLs:     - Feeding: No difficulty      - Bathing: moderate difficulty      - Dressing/Grooming: moderate difficulty     - Home Management: Minimal difficulty     - Driving: does not drive          Patient's Goals for Therapy: "To feel a lot better."     Past Medical History/Physical Systems Review:   Ace Sheffield  has a past medical history of Allergy.    Ace Sheffield  has a past surgical history that includes Appendectomy (2009); Robot-assisted laparoscopic hysterectomy (N/A, 2/27/2024); and Cystoscopy (N/A, 2/27/2024).    Ace has a current medication list which includes the following prescription(s): abilify maintena, cetirizine, diclofenac sodium, fluticasone propionate, hydrocodone-acetaminophen, ibuprofen, lurasidone, minocycline, naproxen, spironolactone, and trazodone.    Review of patient's allergies indicates:  No Known Allergies     Objective      Observation: no guarding postures noted in seated or standing postures.  Guarding is noted with active motion of the shoulder.     Posture: minimally forward rounded shoulders       Active/Passive Range of Motion: Measured in degrees        Shoulder Left Right     AROM  AROM    Flexion WNL   WNL    Abduction WNL  WNL    ER at 0 WNL  WNL    ER at 90 WNL  WNL    IR WNL  WNL    *All " motion is WNL, but movement is slow and guarded.           Upper Extremity Strength   (L) UE (R) UE   Shoulder flexion: 4/5 5/5   Shoulder Abduction: 4-/5* 5/5   Shoulder ER 4-/5 5/5   Shoulder IR 4-/5* 5/5   Lower Trap 4-/5 5/5   Middle Trap 4-/5 5/5   Rhomboids 4-/5 5/5         Special Tests:  AC Joint Left   AC Joint Compression Test Negative     Empty Can Test Positive      Drop Arm test Negative     Subscaputlaris Lift Off Positive     Hawkin's Kenndy Positive     Neer's Test Positive     Speed's test Positive         Joint Mobility: minimal tightness at end ranges of flexion/abduction/ external rotation     Palpation: TTP anterior shoulder/bicipital groove, infraspinatus     Sensation:  Reports occasional numbness/tingling of 4th and 5th digits when leaning on elbow or holding phone.  Light touch, temperature, sharp and dull are grossly intact in left upper arm/shoulder.      Flexibility: normal     Scapular Control/Dyskinesis:    Normal / Subtle / Obvious  Comments    Left  normal N/A     Right  Normal  N/A           Intake Outcome Measure for FOTO shoulder Survey    Therapist reviewed FOTO scores for Ace Sheffield on 8/29/2024.   FOTO report - see Media section or FOTO account episode details.    Intake Score: 68       Treatment      Total Treatment time separate from Evaluation time: 5 minutes     Ace received the treatments listed below:      therapeutic exercises to develop ROM, flexibility, and posture for 5 minutes including:  -Issued/reviewed initial HEP      Patient Education and Home Exercises      Education provided:   -Issued/reviewed initial HEP    -role of OT, goals for OT, scheduling/cancellations, insurance limitations with patient.      Written Home Exercises Provided: Yes.  Exercises were reviewed and Ace was able to demonstrate them prior to the end of the session.    Ace demonstrated good  understanding of the education provided.     Pt was advised to perform these  exercises free of pain, and to stop performing them if pain occurs.    Assessment      Ace Sheffield is a 34 y.o. female referred to outpatient occupational therapy and presents with a medical diagnosis of M25.512,G89.29 (ICD-10-CM) - Chronic left shoulder pain.    Following medical record review it is determined that pt will benefit from occupational therapy services in order to maximize pain free and/or functional use of left shoulder. The following goals were discussed with the patient and patient is in agreement with them as to be addressed in the treatment plan. The patient's rehab potential is Good.     Anticipated barriers to occupational therapy: therapy attendance, compliance with HEP     Plan of care discussed with patient: Yes  Patient's spiritual, cultural and educational needs considered and patient is agreeable to the plan of care and goals as stated below:     Medical Necessity is demonstrated by the following  Occupational Profile/History  Co-morbidities and personal factors that may impact the plan of care [x] LOW: Brief chart review  [] MODERATE: Expanded chart review   [] HIGH: Extensive chart review         Examination  Performance deficits relating to physical, cognitive or psychosocial skills that result in activity limitations and/or participation restrictions  [] LOW: addressing 1-3 Performance deficits  [x] MODERATE: 3-5 Performance deficits  [] HIGH: 5+ Performance deficits (please support below)    Moderate / High Support Documentation:    Physical:  Joint Mobility  Muscle Power/Strength  Muscle Endurance  Postural Control  Pain    Cognitive:  No Deficits    Psychosocial:    No Deficits     Treatment Options [] LOW: Limited options  [] MODERATE: Several options  [] HIGH: Multiple options      Decision Making/ Complexity Score: low       The following goals were discussed with the patient and patient is in agreement with them as to be addressed in the treatment plan.     Goals:     Short  Term Goals: (in 4 weeks)  1) Patient will be independent in HEP in 1-2 visits  2) Decrease pain in left shoulder to no more than 4-5/10 at worst in ADL/IADL's  3) Increase left shoulder strength by 1 muscle grade for increased functional use in ADL/IADL's  4) Patient will perform left shoulder minimally resistive exercises to 90 degrees in sagittal/frontal planes for 2 sets X 10 reps without reports of increased pain for increased functional use in ADL/IADL's.        Long Term Goals: (in 8 weeks)  1) Decrease pain in left shoulder to no more than 2-3/10 at worst in ADL/IADL's  2) Increase strength in left shoulder to WFL for improved functioning in ADL/IADL's  3) Increased functioning in ADL/IADL's, as evidenced by a FOTO impairment rating of no more than 74  4) Patient will perform left upper extremity forward reaching into cabinet with moderate weight for 2 sets X 10 reps for increased functional use in ADL/IADL's.         Plan     Plan of Care Certification: 8/29/2024 to 10/25/2024.     Outpatient Occupational Therapy 2 times weekly for 8 weeks to include the following interventions: Manual therapy/joint mobilizations, Modalities for pain management, US 3 mhz, Therapeutic exercises/activities., and Strengthening.    Shaka Tello OT    Physician's Signature: _________________________________________ Date: ________________

## 2024-08-30 NOTE — PROGRESS NOTES
[facility]  Occupational Therapy Initial Evaluation      Name: Ace Sheffield  Clinic Number: [id number]    Therapy Diagnosis:   Encounter Diagnoses   Name Primary?    Chronic left shoulder pain     Decreased strength Yes     Physician: Lyn Neville MD    Physician Orders: Eval and Treat  Medical Diagnosis: M25.512,G89.29 (ICD-10-CM) - Chronic left shoulder pain  Surgical Procedure and Date: N/A , N/A   Evaluation Date: 2024  Insurance Authorization Period Expiration: 2025   Plan of Care Certification Period: 2024 - 10/25/2024  Date of Return to MD: none noted in chart for referring provider at this time   Visit # / Visits authorized: 1 / pending   FOTO: 1/ 3 Initial=68 / Goal=74      Precautions:  Standard    Time In: 1030   Time Out: 1115  Total Billable Time: 0 minutes    Subjective      Date of Onset: 2 months ago woke up with shoulder pain.        History of Current Condition/Mechanism of Injury: Ace reports: about 2 months ago, she woke up with shoulder pain.  She reports that it does come and go, has not worsened, but it is not going away.  She can also feel and hear a popping sound at the top of her shoulder. She has pain when laying on her left side, reaching across her body, and when trying to use the arm to push up.  When she lifts something heavier, she mostly has pain afterwards or later as if it is sore. She can not recall any injury or increased activity that may have initiated the shoulder pain.     Falls: none    Involved Side: left   Dominant Side: Right    Mechanism of Injury: no known CORDELL   Surgical Procedure: N/A   Imagin2024XR SHOULDER TRAUMA 3 VIEW LEFT  FINDINGS:  Shoulder trauma three views left.   No fracture dislocation bone destruction seen.  No acute trauma seen.     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     Current Functional Status   Home/Living environment: lives with their family     - 1 story home, thresh hold to enter    - DME: N/A       Limitation of Functional Status as follows:   ADLs/IADLs:     - Feeding: No difficulty      - Bathing: moderate difficulty      - Dressing/Grooming: moderate difficulty     - Home Management: Minimal difficulty     - Driving: does not drive          Patient's Goals for Therapy: "To feel a lot better."     Past Medical History/Physical Systems Review:   Ace Sheffield  has a past medical history of Allergy.    Ace Sheffield  has a past surgical history that includes Appendectomy (2009); Robot-assisted laparoscopic hysterectomy (N/A, 2/27/2024); and Cystoscopy (N/A, 2/27/2024).    Ace has a current medication list which includes the following prescription(s): abilify maintena, cetirizine, diclofenac sodium, fluticasone propionate, hydrocodone-acetaminophen, ibuprofen, lurasidone, minocycline, naproxen, spironolactone, and trazodone.    Review of patient's allergies indicates:  No Known Allergies     Objective      Observation: no guarding postures noted in seated or standing postures.  Guarding is noted with active motion of the shoulder.     Posture: minimally forward rounded shoulders       Active/Passive Range of Motion: Measured in degrees        Shoulder Left Right     AROM  AROM    Flexion WNL   WNL    Abduction WNL  WNL    ER at 0 WNL  WNL    ER at 90 WNL  WNL    IR WNL  WNL    *All " motion is WNL, but movement is slow and guarded.           Upper Extremity Strength   (L) UE (R) UE   Shoulder flexion: 4/5 5/5   Shoulder Abduction: 4-/5* 5/5   Shoulder ER 4-/5 5/5   Shoulder IR 4-/5* 5/5   Lower Trap 4-/5 5/5   Middle Trap 4-/5 5/5   Rhomboids 4-/5 5/5         Special Tests:  AC Joint Left   AC Joint Compression Test Negative     Empty Can Test Positive      Drop Arm test Negative     Subscaputlaris Lift Off Positive     Hawkin's Kenndy Positive     Neer's Test Positive     Speed's test Positive         Joint Mobility: minimal tightness at end ranges of flexion/abduction/ external rotation     Palpation: TTP anterior shoulder/bicipital groove, infraspinatus     Sensation:  Reports occasional numbness/tingling of 4th and 5th digits when leaning on elbow or holding phone.  Light touch, temperature, sharp and dull are grossly intact in left upper arm/shoulder.      Flexibility: normal     Scapular Control/Dyskinesis:    Normal / Subtle / Obvious  Comments    Left  normal N/A     Right  Normal  N/A           Intake Outcome Measure for FOTO shoulder Survey    Therapist reviewed FOTO scores for Ace Sheffield on 8/29/2024.   FOTO report - see Media section or FOTO account episode details.    Intake Score: 68       Treatment      Total Treatment time separate from Evaluation time: 5 minutes     Ace received the treatments listed below:      therapeutic exercises to develop ROM, flexibility, and posture for 5 minutes including:  -Issued/reviewed initial HEP      Patient Education and Home Exercises      Education provided:   -Issued/reviewed initial HEP    -role of OT, goals for OT, scheduling/cancellations, insurance limitations with patient.      Written Home Exercises Provided: Yes.  Exercises were reviewed and Ace was able to demonstrate them prior to the end of the session.    Ace demonstrated good  understanding of the education provided.     Pt was advised to perform these  exercises free of pain, and to stop performing them if pain occurs.    Assessment      Ace Sheffield is a 34 y.o. female referred to outpatient occupational therapy and presents with a medical diagnosis of M25.512,G89.29 (ICD-10-CM) - Chronic left shoulder pain.    Following medical record review it is determined that pt will benefit from occupational therapy services in order to maximize pain free and/or functional use of left shoulder. The following goals were discussed with the patient and patient is in agreement with them as to be addressed in the treatment plan. The patient's rehab potential is Good.     Anticipated barriers to occupational therapy: therapy attendance, compliance with HEP     Plan of care discussed with patient: Yes  Patient's spiritual, cultural and educational needs considered and patient is agreeable to the plan of care and goals as stated below:     Medical Necessity is demonstrated by the following  Occupational Profile/History  Co-morbidities and personal factors that may impact the plan of care [x] LOW: Brief chart review  [] MODERATE: Expanded chart review   [] HIGH: Extensive chart review         Examination  Performance deficits relating to physical, cognitive or psychosocial skills that result in activity limitations and/or participation restrictions  [] LOW: addressing 1-3 Performance deficits  [x] MODERATE: 3-5 Performance deficits  [] HIGH: 5+ Performance deficits (please support below)    Moderate / High Support Documentation:    Physical:  Joint Mobility  Muscle Power/Strength  Muscle Endurance  Postural Control  Pain    Cognitive:  No Deficits    Psychosocial:    No Deficits     Treatment Options [] LOW: Limited options  [] MODERATE: Several options  [] HIGH: Multiple options      Decision Making/ Complexity Score: low       The following goals were discussed with the patient and patient is in agreement with them as to be addressed in the treatment plan.     Goals:     Short  Term Goals: (in 4 weeks)  1) Patient will be independent in HEP in 1-2 visits  2) Decrease pain in left shoulder to no more than 4-5/10 at worst in ADL/IADL's  3) Increase left shoulder strength by 1 muscle grade for increased functional use in ADL/IADL's  4) Patient will perform left shoulder minimally resistive exercises to 90 degrees in sagittal/frontal planes for 2 sets X 10 reps without reports of increased pain for increased functional use in ADL/IADL's.        Long Term Goals: (in 8 weeks)  1) Decrease pain in left shoulder to no more than 2-3/10 at worst in ADL/IADL's  2) Increase strength in left shoulder to WFL for improved functioning in ADL/IADL's  3) Increased functioning in ADL/IADL's, as evidenced by a FOTO impairment rating of no more than 74  4) Patient will perform left upper extremity forward reaching into cabinet with moderate weight for 2 sets X 10 reps for increased functional use in ADL/IADL's.         Plan     Plan of Care Certification: 8/29/2024 to 10/25/2024.     Outpatient Occupational Therapy 2 times weekly for 8 weeks to include the following interventions: Manual therapy/joint mobilizations, Modalities for pain management, US 3 mhz, Therapeutic exercises/activities., and Strengthening.    Shaka Tello OT    Physician's Signature: _________________________________________ Date: ________________

## 2024-09-25 NOTE — PROGRESS NOTES
[facility]  Occupational Therapy Treatment Note     Date: 9/26/2024  Name: Ace Sheffield  Clinic Number: [id number]    Therapy Diagnosis:   Encounter Diagnoses   Name Primary?    Chronic left shoulder pain Yes    Decreased strength      Physician: Lyn Neville MD    Physician Orders: Eval and Treat  Medical Diagnosis: M25.512,G89.29 (ICD-10-CM) - Chronic left shoulder pain  Surgical Procedure and Date: N/A , N/A   Evaluation Date: 8/29/2024  Insurance Authorization Period Expiration: 8/12/2025   Plan of Care Certification Period: 8/29/2024 - 10/25/2024  Date of Return to MD: none noted in chart for referring provider at this time   Visit # / Visits authorized: 1 / 10    FOTO: 1/ 3 Initial=68 / Goal=74       Precautions:  Standard     Time In: 1250   Time Out: 1335  Total Billable Time: 45 minutes (3TE)       Subjective     Patient reports: that now both shoulders are hurting.  She feels pain when she squeezes her shoulders backwards and when her arms hang at her side.      She was compliant with home exercise program given last session.   Response to previous treatment:first treatment following evaluation   Functional change: first treatment following evaluation     Pain: 8/10  Location: left shoulder      Objective     Objective Measures updated at progress report unless specified.    Observation: no guarding postures noted in seated or standing postures.  Guarding is noted with active motion of the shoulder.      Posture: minimally forward rounded shoulders         Active/Passive Range of Motion: Measured in degrees           Shoulder Left Right      AROM  AROM    Flexion WNL   WNL    Abduction WNL  WNL    ER at 0 WNL  WNL    ER at 90 WNL  WNL    IR WNL  WNL    *All motion is WNL, but movement is slow and guarded.            Upper Extremity Strength    (L) UE (R) UE   Shoulder flexion: 4/5 5/5   Shoulder Abduction: 4-/5* 5/5   Shoulder ER 4-/5 5/5   Shoulder IR 4-/5* 5/5   Lower Trap  4-/5 5/5   Middle Trap 4-/5 5/5   Rhomboids 4-/5 5/5            Special Tests:  AC Joint Left   AC Joint Compression Test Negative     Empty Can Test Positive       Drop Arm test Negative     Subscaputlaris Lift Off Positive     Hawkin's Kenndy Positive     Neer's Test Positive     Speed's test Positive           Joint Mobility: minimal tightness at end ranges of flexion/abduction/ external rotation      Palpation: TTP anterior shoulder/bicipital groove, infraspinatus      Sensation:  Reports occasional numbness/tingling of 4th and 5th digits when leaning on elbow or holding phone.  Light touch, temperature, sharp and dull are grossly intact in left upper arm/shoulder.       Flexibility: normal      Scapular Control/Dyskinesis:     Normal / Subtle / Obvious  Comments    Left  normal N/A     Right  Normal  N/A          Treatment     Ace received the treatments listed below:        therapeutic exercises to develop strength, endurance, ROM, flexibility, and posture for 45 minutes including:  - overhead pulleys scaption/ flexion 2 min each   - chest stretches on wall, low, 5 reps X 10 sec hold   Supine:   - shoulder flexion, 1# dowel 2/10   - shoulder abduction, 1# dowel 2/10   - horizontal abduction, 1# 2/10    - serratus punches 1# 2/10   - shoulder rolls, forward and backwards X 20 each   - shoulder shrugs (gentle, short arc) X 20   - scapular retractions (gentle, short arc) X 20         Patient Education and Home Exercises     Education provided:   -application of ice for post session soreness 9/26/2024  -Issued additional HEP stretches 9/26/2024  -Issued/reviewed initial HEP    -role of OT, goals for OT, scheduling/cancellations, insurance limitations with patient.  - Progress towards goals     Written Home Exercises Provided: Yes.  Exercises were reviewed and Ace was able to demonstrate them prior to the end of the session.  Ace demonstrated good  understanding of the home exercise program provided.  See electronic medical record under Patient Instructions for exercises provided during therapy sessions.       Assessment   Ace Sheffield is a 34 y.o. female referred to outpatient occupational therapy and presents with a medical diagnosis of M25.512,G89.29 (ICD-10-CM) - Chronic left shoulder pain.  Patient reports pain at end ranges of flexion/abduction and with horizontal adduction.  She is tender to palpation at anterior shoulder.  Patient tolerated all therapeutic exercises without significant pain or difficulty.   She reported increased comfort at end of session.  She participates well.  Will progress per patient tolerance.      Ace is progressing well towards her goals and there are no updates to goals at this time. Pt prognosis is Good.     Patient will continue to benefit from skilled outpatient occupational therapy to address the deficits listed in the problem list on initial evaluation provide patient/family education and to maximize patient's level of independence in the home and community environment.     Patient's spiritual, cultural and educational needs considered and patient agreeable to plan of care and goals.    Anticipated barriers to occupational therapy: therapy attendance, compliance with HEP    Goals:  Short Term Goals: (in 4 weeks)  1) Patient will be independent in HEP in 1-2 visits -Progressing   2) Decrease pain in left shoulder to no more than 4-5/10 at worst in ADL/IADL's-Progressing   3) Increase left shoulder strength by 1 muscle grade for increased functional use in ADL/IADL's-Progressing   4) Patient will perform left shoulder minimally resistive exercises to 90 degrees in sagittal/frontal planes for 2 sets X 10 reps without reports of increased pain for increased functional use in ADL/IADL's. -Progressing        Long Term Goals: (in 8 weeks)  1) Decrease pain in left shoulder to no more than 2-3/10 at worst in ADL/IADL's  2) Increase strength in left shoulder to WFL for  improved functioning in ADL/IADL's  3) Increased functioning in ADL/IADL's, as evidenced by a FOTO impairment rating of no more than 74  4) Patient will perform left upper extremity forward reaching into cabinet with moderate weight for 2 sets X 10 reps for increased functional use in ADL/IADL's.         Plan   Plan of Care Certification: 8/29/2024 to 10/25/2024.      Outpatient Occupational Therapy 2 times weekly for 8 weeks to include the following interventions: Manual therapy/joint mobilizations, Modalities for pain management, US 3 mhz, Therapeutic exercises/activities., and Strengthening.     Updates/Grading for next session: initiate modalities as needed, initiate soft tissue mobilization as needed, progress strengthening exercises as tolerated     Shaka Tello OT   9/26/2024

## 2024-09-26 ENCOUNTER — CLINICAL SUPPORT (OUTPATIENT)
Dept: REHABILITATION | Facility: HOSPITAL | Age: 34
End: 2024-09-26
Payer: MEDICAID

## 2024-09-26 DIAGNOSIS — G89.29 CHRONIC LEFT SHOULDER PAIN: Primary | ICD-10-CM

## 2024-09-26 DIAGNOSIS — R53.1 DECREASED STRENGTH: ICD-10-CM

## 2024-09-26 DIAGNOSIS — M25.512 CHRONIC LEFT SHOULDER PAIN: Primary | ICD-10-CM

## 2024-09-26 PROCEDURE — 97110 THERAPEUTIC EXERCISES: CPT | Mod: PO

## 2024-10-03 ENCOUNTER — CLINICAL SUPPORT (OUTPATIENT)
Dept: REHABILITATION | Facility: HOSPITAL | Age: 34
End: 2024-10-03
Payer: MEDICAID

## 2024-10-03 DIAGNOSIS — G89.29 CHRONIC LEFT SHOULDER PAIN: Primary | ICD-10-CM

## 2024-10-03 DIAGNOSIS — R53.1 DECREASED STRENGTH: ICD-10-CM

## 2024-10-03 DIAGNOSIS — M25.512 CHRONIC LEFT SHOULDER PAIN: Primary | ICD-10-CM

## 2024-10-03 PROCEDURE — 97530 THERAPEUTIC ACTIVITIES: CPT | Mod: PO

## 2024-10-03 NOTE — PROGRESS NOTES
[facility] OUTPAT[facility] THERAPY AND WELLNESS  Occupational Therapy Treatment Note     Date: 10/3/2024  Name: Ace Sheffield  Clinic Number: [id number]    Therapy Diagnosis:   Encounter Diagnoses   Name Primary?    Chronic left shoulder pain Yes    Decreased strength        Physician: Lyn Neville MD    Physician Orders: Eval and Treat  Medical Diagnosis: M25.512,G89.29 (ICD-10-CM) - Chronic left shoulder pain  Surgical Procedure and Date: N/A , N/A   Evaluation Date: 8/29/2024  Insurance Authorization Period Expiration: 8/12/2025   Plan of Care Certification Period: 8/29/2024 - 10/25/2024  Date of Return to MD: none noted in chart for referring provider at this time   Visit # / Visits authorized: 2 / 10    FOTO: 1/ 3 Initial=68 / Goal=74       Precautions:  Standard     Time In: 0900  Time Out: 0945  Total Billable Time: 40 minutes (3 TA)       Subjective     Patient reports: her right shoulder has stopped hurting.  She continues to feel pain when she squeezes her shoulders backwards and when her arms hang at her side, but it is a little better since last visit.      She was compliant with home exercise program given last session.   Response to previous treatment:None noted/reported at this time.    Functional change: None noted/reported at this time.      Pain: 7/10   Location: left shoulder      Objective     Objective Measures updated at progress report unless specified.    Observation: no guarding postures noted in seated or standing postures.  Guarding is noted with active motion of the shoulder.      Posture: minimally forward rounded shoulders         Active/Passive Range of Motion: Measured in degrees           Shoulder Left Right      AROM  AROM    Flexion WNL   WNL    Abduction WNL  WNL    ER at 0 WNL  WNL    ER at 90 WNL  WNL    IR WNL  WNL    *All motion is WNL, but movement is slow and guarded.            Upper Extremity Strength    (L) UE (R) UE   Shoulder flexion: 4/5 5/5   Shoulder Abduction: 4-/5* 5/5    Shoulder ER 4-/5 5/5   Shoulder IR 4-/5* 5/5   Lower Trap 4-/5 5/5   Middle Trap 4-/5 5/5   Rhomboids 4-/5 5/5            Special Tests:  AC Joint Left   AC Joint Compression Test Negative     Empty Can Test Positive       Drop Arm test Negative     Subscaputlaris Lift Off Positive     Hawkin's Kenndy Positive     Neer's Test Positive     Speed's test Positive           Joint Mobility: minimal tightness at end ranges of flexion/abduction/ external rotation      Palpation: TTP anterior shoulder/bicipital groove, infraspinatus      Sensation:  Reports occasional numbness/tingling of 4th and 5th digits when leaning on elbow or holding phone.  Light touch, temperature, sharp and dull are grossly intact in left upper arm/shoulder.       Flexibility: normal      Scapular Control/Dyskinesis:     Normal / Subtle / Obvious  Comments    Left  normal N/A     Right  Normal  N/A          Treatment     Ace received the treatments listed below:      Patient received MH x 5 min to left shoulder  to increase blood flow, circulation and tissue elasticity prior to therex     Manual therapy techniques: Soft tissue Mobilization and cross Friction Massage were applied to the: left shoulder (anterior aspect) for 5 minutes, including:  - use of hand techniques, cupping (not today) and IASTM tools (not today) to increase blood flow/circulation, improve soft tissue pliability and decrease pain.    therapeutic exercises to develop strength, endurance, ROM, flexibility, and posture for 30 minutes including:    - overhead pulleys scaption/ flexion 2 min each       Supine:   - chest stretch over towel supine 3 min   - shoulder flexion, 1# dowel 2/10    - shoulder abduction, 1# dowel 2/10   - horizontal abduction, 1# 2/10   - serratus punches 1# 2/10   - shoulder rolls, forward and backwards X 20 each    - shoulder shrugs (gentle, short arc) X 20   - scapular retractions (gentle, short arc) X 20     Patient Education and Home Exercises      Education provided:   -application of ice for post session soreness 9/26/2024  -Issued additional HEP stretches 9/26/2024  -Issued/reviewed initial HEP    -role of OT, goals for OT, scheduling/cancellations, insurance limitations with patient.  - Progress towards goals     Written Home Exercises Provided: Yes.  Exercises were reviewed and Ace was able to demonstrate them prior to the end of the session.  Ace demonstrated good  understanding of the home exercise program provided. See electronic medical record under Patient Instructions for exercises provided during therapy sessions.       Assessment   Ace Sheffield is a 34 y.o. female referred to outpatient occupational therapy and presents with a medical diagnosis of M25.512,G89.29 (ICD-10-CM) - Chronic left shoulder pain.  Patient reports pain at end ranges of flexion/abduction and with horizontal adduction.  She is tender to palpation at anterior shoulder.  Patient tolerated all therapeutic exercises without significant pain or difficulty.   She reported increased comfort at end of session.  She participates well.  Will progress per patient tolerance.      Ace is progressing well towards her goals and there are no updates to goals at this time. Pt prognosis is Good.     Patient will continue to benefit from skilled outpatient occupational therapy to address the deficits listed in the problem list on initial evaluation provide patient/family education and to maximize patient's level of independence in the home and community environment.     Patient's spiritual, cultural and educational needs considered and patient agreeable to plan of care and goals.    Anticipated barriers to occupational therapy: therapy attendance, compliance with HEP    Goals:  Short Term Goals: (in 4 weeks)  1) Patient will be independent in HEP in 1-2 visits -Progressing   2) Decrease pain in left shoulder to no more than 4-5/10 at worst in ADL/IADL's-Progressing   3)  Increase left shoulder strength by 1 muscle grade for increased functional use in ADL/IADL's-Progressing   4) Patient will perform left shoulder minimally resistive exercises to 90 degrees in sagittal/frontal planes for 2 sets X 10 reps without reports of increased pain for increased functional use in ADL/IADL's. -Progressing        Long Term Goals: (in 8 weeks)  1) Decrease pain in left shoulder to no more than 2-3/10 at worst in ADL/IADL's  2) Increase strength in left shoulder to WFL for improved functioning in ADL/IADL's  3) Increased functioning in ADL/IADL's, as evidenced by a FOTO impairment rating of no more than 74  4) Patient will perform left upper extremity forward reaching into cabinet with moderate weight for 2 sets X 10 reps for increased functional use in ADL/IADL's.         Plan   Plan of Care Certification: 8/29/2024 to 10/25/2024.      Outpatient Occupational Therapy 2 times weekly for 8 weeks to include the following interventions: Manual therapy/joint mobilizations, Modalities for pain management, US 3 mhz, Therapeutic exercises/activities., and Strengthening.     Updates/Grading for next session: initiate modalities as needed, initiate soft tissue mobilization as needed, progress strengthening exercises as tolerated     Shaka Tello OT   10/3/2024

## 2024-10-10 ENCOUNTER — CLINICAL SUPPORT (OUTPATIENT)
Dept: REHABILITATION | Facility: HOSPITAL | Age: 34
End: 2024-10-10
Payer: MEDICAID

## 2024-10-10 DIAGNOSIS — G89.29 CHRONIC LEFT SHOULDER PAIN: Primary | ICD-10-CM

## 2024-10-10 DIAGNOSIS — R53.1 DECREASED STRENGTH: ICD-10-CM

## 2024-10-10 DIAGNOSIS — M25.512 CHRONIC LEFT SHOULDER PAIN: Primary | ICD-10-CM

## 2024-10-10 PROCEDURE — 97530 THERAPEUTIC ACTIVITIES: CPT | Mod: PO

## 2024-10-10 NOTE — PROGRESS NOTES
[facility]  Occupational Therapy Treatment Note     Date: 10/10/2024  Name: Ace Sheffield  Clinic Number: [id number]    Therapy Diagnosis:   Encounter Diagnoses   Name Primary?    Chronic left shoulder pain Yes    Decreased strength        Physician: Lyn Neville MD    Physician Orders: Eval and Treat  Medical Diagnosis: M25.512,G89.29 (ICD-10-CM) - Chronic left shoulder pain  Surgical Procedure and Date: N/A , N/A   Evaluation Date: 8/29/2024  Insurance Authorization Period Expiration: 8/12/2025   Plan of Care Certification Period: 8/29/2024 - 10/25/2024  Date of Return to MD: none noted in chart for referring provider at this time   Visit # / Visits authorized: 3 / 10    FOTO: 1/ 3 Initial=68 / Goal=74       Precautions:  Standard     Time In: 0855  Time Out: 0935  Total Billable Time: 40 minutes (3 TA)       Subjective     Patient reports: her shoulder is feeling better.  There is no increase in pain.      She was compliant with home exercise program given last session.   Response to previous treatment:No significant soreness reported   Functional change: None noted/reported at this time.      Pain: 6.5/10   Location: left shoulder      Objective     Objective Measures updated at progress report unless specified.    Observation: no guarding postures noted in seated or standing postures.  Guarding is noted with active motion of the shoulder.      Posture: minimally forward rounded shoulders         Active/Passive Range of Motion: Measured in degrees           Shoulder Left Right      AROM  AROM    Flexion WNL   WNL    Abduction WNL  WNL    ER at 0 WNL  WNL    ER at 90 WNL  WNL    IR WNL  WNL    *All motion is WNL, but movement is slow and guarded.            Upper Extremity Strength    (L) UE (R) UE   Shoulder flexion: 4/5 5/5   Shoulder Abduction: 4-/5* 5/5   Shoulder ER 4-/5 5/5   Shoulder IR 4-/5* 5/5   Lower Trap 4-/5 5/5   Middle Trap 4-/5 5/5   Rhomboids 4-/5 5/5             Special Tests:  AC Joint Left   AC Joint Compression Test Negative     Empty Can Test Positive       Drop Arm test Negative     Subscaputlaris Lift Off Positive     Hawkin's Kenndy Positive     Neer's Test Positive     Speed's test Positive           Joint Mobility: minimal tightness at end ranges of flexion/abduction/ external rotation      Palpation: TTP anterior shoulder/bicipital groove, infraspinatus      Sensation:  Reports occasional numbness/tingling of 4th and 5th digits when leaning on elbow or holding phone.  Light touch, temperature, sharp and dull are grossly intact in left upper arm/shoulder.       Flexibility: normal      Scapular Control/Dyskinesis:     Normal / Subtle / Obvious  Comments    Left  normal N/A     Right  Normal  N/A          Treatment     Ace received the treatments listed below:        Manual therapy techniques: Soft tissue Mobilization and cross Friction Massage were applied to the: left shoulder (anterior aspect) for 0 minutes, including:  - use of hand techniques, cupping (not today) and IASTM tools (not today) to increase blood flow/circulation, improve soft tissue pliability and decrease pain.    therapeutic exercises to develop strength, endurance, ROM, flexibility, and posture for 40 minutes including:    - overhead pulleys scaption/ flexion 2 min each       Supine:   - chest stretch over towel supine 3 min   - serratus punches 1# 2/10   - shoulder flexion, 1# dowel 2/10    - shoulder abduction, 1# dowel 2/10   - horizontal abduction, 1# 2/10   - chest press 1#, 2/10    - ball on wall, supine CW/CCW 1 min each direction   - shoulder rolls, forward and backwards X 20 each  - shoulder shrugs (gentle, short arc) X 20   - scapular retractions (gentle, short arc) X 20     Theraband, yellow:   -high row 2/10  -low row 2 /10      Patient Education and Home Exercises     Education provided:   -application of ice for post session soreness 9/26/2024  -Issued additional HEP  stretches 9/26/2024  -Issued/reviewed initial HEP    -role of OT, goals for OT, scheduling/cancellations, insurance limitations with patient.  - Progress towards goals     Written Home Exercises Provided: Yes.  Exercises were reviewed and Ace was able to demonstrate them prior to the end of the session.  Ace demonstrated good  understanding of the home exercise program provided. See electronic medical record under Patient Instructions for exercises provided during therapy sessions.       Assessment   Ace Sheffield is a 34 y.o. female referred to outpatient occupational therapy and presents with a medical diagnosis of M25.512,G89.29 (ICD-10-CM) - Chronic left shoulder pain.  Patient had difficulty staying awake during today's session.  Patient reports pain at end ranges of flexion/abduction and with horizontal adduction.  She is tender to palpation at anterior shoulder.  Patient tolerated all therapeutic exercises without significant pain or difficulty.   She was observed to perform shoulder retraction with more ease and less guarding.  She reported increased comfort at end of session.  She participates well.  Will progress per patient tolerance.        Ace is progressing well towards her goals and there are no updates to goals at this time. Pt prognosis is Good.     Patient will continue to benefit from skilled outpatient occupational therapy to address the deficits listed in the problem list on initial evaluation provide patient/family education and to maximize patient's level of independence in the home and community environment.     Patient's spiritual, cultural and educational needs considered and patient agreeable to plan of care and goals.    Anticipated barriers to occupational therapy: therapy attendance, compliance with HEP    Goals:  Short Term Goals: (in 4 weeks)  1) Patient will be independent in HEP in 1-2 visits -Progressing   2) Decrease pain in left shoulder to no more than 4-5/10 at  worst in ADL/IADL's-Progressing   3) Increase left shoulder strength by 1 muscle grade for increased functional use in ADL/IADL's-Progressing   4) Patient will perform left shoulder minimally resistive exercises to 90 degrees in sagittal/frontal planes for 2 sets X 10 reps without reports of increased pain for increased functional use in ADL/IADL's. -Progressing        Long Term Goals: (in 8 weeks)  1) Decrease pain in left shoulder to no more than 2-3/10 at worst in ADL/IADL's  2) Increase strength in left shoulder to WFL for improved functioning in ADL/IADL's  3) Increased functioning in ADL/IADL's, as evidenced by a FOTO impairment rating of no more than 74  4) Patient will perform left upper extremity forward reaching into cabinet with moderate weight for 2 sets X 10 reps for increased functional use in ADL/IADL's.         Plan   Plan of Care Certification: 8/29/2024 to 10/25/2024.      Outpatient Occupational Therapy 2 times weekly for 8 weeks to include the following interventions: Manual therapy/joint mobilizations, Modalities for pain management, US 3 mhz, Therapeutic exercises/activities., and Strengthening.     Updates/Grading for next session: initiate modalities as needed, initiate soft tissue mobilization as needed, progress strengthening exercises as tolerated     Shaka Tello OT   10/10/2024

## 2024-10-14 ENCOUNTER — HOSPITAL ENCOUNTER (EMERGENCY)
Facility: HOSPITAL | Age: 34
Discharge: HOME | End: 2024-10-14
Attending: EMERGENCY MEDICINE
Payer: MEDICAID

## 2024-10-14 VITALS
SYSTOLIC BLOOD PRESSURE: 142 MMHG | TEMPERATURE: 98 F | HEIGHT: 64 IN | RESPIRATION RATE: 18 BRPM | BODY MASS INDEX: 52.26 KG/M2 | HEART RATE: 76 BPM | OXYGEN SATURATION: 98 % | DIASTOLIC BLOOD PRESSURE: 74 MMHG

## 2024-10-14 DIAGNOSIS — R68.84 JAW PAIN: Primary | ICD-10-CM

## 2024-10-14 PROCEDURE — 99284 EMERGENCY DEPT VISIT MOD MDM: CPT | Mod: ER

## 2024-10-14 RX ORDER — LIDOCAINE HYDROCHLORIDE 20 MG/ML
SOLUTION OROPHARYNGEAL
Qty: 100 ML | Refills: 0 | Status: SHIPPED | OUTPATIENT
Start: 2024-10-14

## 2024-10-14 RX ORDER — NAPROXEN 500 MG/1
500 TABLET ORAL 2 TIMES DAILY
Qty: 60 TABLET | Refills: 0 | Status: SHIPPED | OUTPATIENT
Start: 2024-10-14

## 2024-10-14 NOTE — ED PROVIDER NOTES
"Encounter Date: 10/14/2024       History     Chief Complaint   Patient presents with    Facial Swelling     Pt reports left jaw swelling and pain x 2 days. PT reports something on "jaw inside mouth" Denies dental pain     34-year-old female with past medical history of allergies presents to the ED with left-sided jaw pain x 2 days.  Patient notes tenderness palpation on the posterior lower mandibular gum. Notes worsening pain with chewing and when she opens her mouth completely. No ear pain. Denies dental cavies, neck pain, drooling, no difficulty swallowing, voice changes or trismus. States she still has her wisdom teeth. No recent trauma.  No fever, chills, nausea, vomiting, chest pain, shortness of breath, sore throat.  No other acute complaints today.    The history is provided by the patient.     Review of patient's allergies indicates:  No Known Allergies  Past Medical History:   Diagnosis Date    Allergy      Past Surgical History:   Procedure Laterality Date    APPENDECTOMY  2009    CYSTOSCOPY N/A 2/27/2024    Procedure: CYSTOSCOPY;  Surgeon: Juanis Chauhan MD;  Location: [facility] OR;  Service: OB/GYN;  Laterality: N/A;    ROBOT-ASSISTED LAPAROSCOPIC HYSTERECTOMY N/A 2/27/2024    Procedure: ROBOTIC HYSTERECTOMY;  Surgeon: Juanis Chauhan MD;  Location: [facility] OR;  Service: OB/GYN;  Laterality: N/A;     Family History   Problem Relation Name Age of Onset    No Known Problems Father      No Known Problems Mother      Breast cancer Neg Hx      Colon cancer Neg Hx      Ovarian cancer Neg Hx       Social History     Tobacco Use    Smoking status: Every Day     Types: Cigarettes, Vaping with nicotine     Start date: 3/9/2022    Smokeless tobacco: Never   Substance Use Topics    Alcohol use: Not Currently     Alcohol/week: 14.0 standard drinks of alcohol     Types: 14 Cans of beer per week    Drug use: Never     Review of Systems   Constitutional:  Negative for chills and fever.   HENT:  Positive for " dental problem. Negative for drooling, ear discharge, ear pain, facial swelling, sore throat, trouble swallowing and voice change.    Respiratory:  Negative for shortness of breath.    Cardiovascular:  Negative for chest pain.   Gastrointestinal:  Negative for abdominal distention, abdominal pain, nausea and vomiting.   Musculoskeletal:  Negative for neck pain and neck stiffness.   Skin:  Negative for color change.   Neurological:  Negative for headaches.       Physical Exam     Initial Vitals [10/14/24 1136]   BP Pulse Resp Temp SpO2   (!) 142/74 76 18 98.3 °F (36.8 °C) 98 %      MAP       --         Physical Exam    Vitals reviewed.  Constitutional: She appears well-developed and well-nourished. She is not diaphoretic. No distress.   HENT:   Head: Normocephalic and atraumatic.   Right Ear: External ear normal.   Left Ear: External ear normal. Mouth/Throat: Oropharynx is clear and moist.   Dental:  floor of the mouth is soft, submandibular area shows no sign's of Michele's angina/spreading infection. No soft tissue swelling and induration noted on the left mandible. No overlying cellulitic change or crepitus appreciated on palpation. No facial swelling noted. There is no trismus; No changes in the voice. No uvula deviation or uvulitis. No compromise of his airway.      Eyes: EOM are normal.   Neck: Neck supple.   Normal range of motion.  Cardiovascular:  Normal rate and normal heart sounds.           Pulmonary/Chest: Breath sounds normal. No respiratory distress. She has no wheezes.   Abdominal: Abdomen is soft. Bowel sounds are normal. She exhibits no distension. There is no abdominal tenderness.   Musculoskeletal:         General: Normal range of motion.      Cervical back: Normal range of motion and neck supple.     Neurological: She is alert and oriented to person, place, and time. GCS score is 15. GCS eye subscore is 4. GCS verbal subscore is 5. GCS motor subscore is 6.   Skin: Skin is warm. Capillary refill  takes less than 2 seconds.   Psychiatric: She has a normal mood and affect. Her behavior is normal. Judgment and thought content normal.         ED Course   Procedures  Labs Reviewed - No data to display       Imaging Results    None          Medications - No data to display  Medical Decision Making  Differential Diagnosis includes, but is not limited to:  Michele's angina, acute necrotizing ulcerative gingivitis, epiglottitis, parotitis, gingival abscess, facial cellulitis, peritonsillar/retropharyngeal abscess, sialolithiasis, periapical abscess, pulpitis, dental fracture, dental caries, aphthous ulcer, wisdom teeth    ED management     34-year-old female with past medical history of allergies presents to the ED with left-sided jaw pain x 2 days.  Patient is not toxic appearing, hemodynamically stable and resting comfortably on bed. Patient is well-appearing.  Awake and alert.  Hypertensive, Afebrile with vitals WNL. No distress on exam. On physical exam, no sig trismus, floor of the mouth is soft, submandibular area shows no sign's of Michele's angina/spreading infection. No obvious overt trauma.  The dental area is tender to chewing.  Did not appreciate facial swelling on exam. Based upon the history and physical I see no signs of sublingual swelling, airway compromise, sepsis, or a fluctuant abscess to drain. I believe the patient can be discharged home ibuprofen or Tylenol as needed for pain. Will send home with Rx topical lidocaine. No evidence of infection, will defer at this time. Symptoms most likely suggestive of pain secondary to wisdom tooth. Patient should follow-up with [facility] for further evaluation. Dental resources provided.        I have discussed the specifics of the workup with the patient and the patient has verbalized understanding of the details of the workup, the diagnosis, the treatment plan, and the need for outpatient follow-up with PCP. ED precautions given. Discussed with pt about returning  to the ED, if symptoms fail to improve or worsen.     RESULTS:  Documented in ED course.   Labs/ekg interpreted by myself       Voice recognition software utilized in this note. Typographical and content errors may occur with this process. While efforts are made to detect and correct such errors, in some cases errors will persist. For this reason, wording in this document should be considered in the proper context and not strictly verbatim. On physical exam, I do not see evidence of dental abscess or severe dental cavities.                     Risk  Prescription drug management.                                      Clinical Impression:  Final diagnoses:  [R68.84] Jaw pain (Primary)          ED Disposition Condition    Discharge Stable          ED Prescriptions       Medication Sig Dispense Start Date End Date Auth. Provider    naproxen (NAPROSYN) 500 MG tablet Take 1 tablet (500 mg total) by mouth 2 (two) times daily. 60 tablet 10/14/2024 -- Luna Avila PA-C    LIDOcaine viscous HCl 2% (XYLOCAINE) 2 % Soln by Mucous Membrane route every 3 (three) hours. 100 mL 10/14/2024 -- Luna Avila PA-C          Follow-up Information       Follow up With Specialties Details Why Contact Info    Lyn Neville MD Family Medicine Schedule an appointment as soon as possible for a visit in 3 days As needed, If symptoms worsen [address] [address] [phone]               Luna Avila PA-C  10/14/24 4725

## 2024-10-24 ENCOUNTER — CLINICAL SUPPORT (OUTPATIENT)
Dept: REHABILITATION | Facility: HOSPITAL | Age: 34
End: 2024-10-24
Payer: MEDICAID

## 2024-10-24 DIAGNOSIS — G89.29 CHRONIC LEFT SHOULDER PAIN: Primary | ICD-10-CM

## 2024-10-24 DIAGNOSIS — R53.1 DECREASED STRENGTH: ICD-10-CM

## 2024-10-24 DIAGNOSIS — M25.512 CHRONIC LEFT SHOULDER PAIN: Primary | ICD-10-CM

## 2024-10-24 PROCEDURE — 97530 THERAPEUTIC ACTIVITIES: CPT | Mod: PO

## 2024-10-24 NOTE — PROGRESS NOTES
"[facility]  Occupational Therapy Treatment Note     Date: 10/24/2024  Name: Ace Sheffield  Clinic Number: [id number]    Therapy Diagnosis:   Encounter Diagnoses   Name Primary?    Chronic left shoulder pain Yes    Decreased strength        Physician: Lyn Neville MD    Physician Orders: Eval and Treat  Medical Diagnosis: M25.512,G89.29 (ICD-10-CM) - Chronic left shoulder pain  Surgical Procedure and Date: N/A , N/A   Evaluation Date: 8/29/2024  Insurance Authorization Period Expiration: 8/12/2025   Plan of Care Certification Period: 8/29/2024 - 10/25/2024  Date of Return to MD: none noted in chart for referring provider at this time   Visit # / Visits authorized: 4 / 10    FOTO: 1/ 3 Initial=68 / Goal=74       Precautions:  Standard     Time In: 1342   Time Out: 1420  Total Billable Time: 38 minutes (3 TA)       Subjective     Patient reports:  "It's much better."  Her shoulder has no resting pain or pain with movement.      She was compliant with home exercise program given last session.   Response to previous treatment: decreased pain in shoulder   Functional change: has been able to use  at work without issues      Pain: 0/10 at rest and with movement   Location: left shoulder      Objective     Objective Measures updated at progress report unless specified.    Observation: no guarding postures noted in seated or standing postures.  Guarding is noted with active motion of the shoulder.      Posture: minimally forward rounded shoulders         Active/Passive Range of Motion: Measured in degrees           Shoulder Left Right      AROM  AROM    Flexion WNL   WNL    Abduction WNL  WNL    ER at 0 WNL  WNL    ER at 90 WNL  WNL    IR WNL  WNL    *All motion is WNL, but movement is slow and guarded.            Upper Extremity Strength    (L) UE (R) UE   Shoulder flexion: 4/5 5/5   Shoulder Abduction: 4-/5* 5/5   Shoulder ER 4-/5 5/5   Shoulder IR 4-/5* 5/5   Lower Trap 4-/5 5/5 "   Middle Trap 4-/5 5/5   Rhomboids 4-/5 5/5            Special Tests:  AC Joint Left   AC Joint Compression Test Negative     Empty Can Test Positive       Drop Arm test Negative     Subscaputlaris Lift Off Positive     Hawkin's Kenndy Positive     Neer's Test Positive     Speed's test Positive           Joint Mobility: minimal tightness at end ranges of flexion/abduction/ external rotation      Palpation: TTP anterior shoulder/bicipital groove, infraspinatus      Sensation:  Reports occasional numbness/tingling of 4th and 5th digits when leaning on elbow or holding phone.  Light touch, temperature, sharp and dull are grossly intact in left upper arm/shoulder.       Flexibility: normal      Scapular Control/Dyskinesis:     Normal / Subtle / Obvious  Comments    Left  normal N/A     Right  Normal  N/A          Treatment     cAe received the treatments listed below:        Manual therapy techniques: Soft tissue Mobilization and cross Friction Massage were applied to the: left shoulder (anterior aspect) for 0 minutes, including:  - use of hand techniques, cupping (not today) and IASTM tools (not today) to increase blood flow/circulation, improve soft tissue pliability and decrease pain.    therapeutic exercises to develop strength, endurance, ROM, flexibility, and posture for 38 minutes including:    - UBE, Level 1.0, forwards/backwards 6 min (split equally)   - overhead pulleys scaption/ flexion 2 min each   - chest stretches on wall, low, mid, high 5 reps X 10 sec hold       Supine:   - chest stretch over towel supine 3 min   - serratus punches 1# 2/10   - shoulder flexion, 1# dowel 2/10    - shoulder abduction, 1# dowel 2/10   - horizontal abduction, 1# 2/10   - chest press 1#, 2/10      Seated:   - ball on wall, supine CW/CCW 1 min each direction   - shoulder rolls, forward and backwards X 20 each  - shoulder shrugs (gentle, short arc) X 20   - scapular retractions (gentle, short arc) X 20       Theraband,  yellow:   -lat pulls 2/10   -shoulder extension 2/10   -shoulder adduction 2/10   -shoulder depression 2/10   -high row 2/10  -low row 2 /10    -pull aparts, waist level 2/10  -no monies X 20      Patient Education and Home Exercises     Education provided:   -Issued/reviewed theraband HEP; issued yellow theraband 10/24/2024    -application of ice for post session soreness 9/26/2024  -Issued additional HEP stretches 9/26/2024  -Issued/reviewed initial HEP    -role of OT, goals for OT, scheduling/cancellations, insurance limitations with patient.  - Progress towards goals     Written Home Exercises Provided: Yes.  Exercises were reviewed and Ace was able to demonstrate them prior to the end of the session.  Ace demonstrated good  understanding of the home exercise program provided. See electronic medical record under Patient Instructions for exercises provided during therapy sessions.       Assessment   Ace Sheffield is a 34 y.o. female referred to outpatient occupational therapy and presents with a medical diagnosis of M25.512,G89.29 (ICD-10-CM) - Chronic left shoulder pain.  Patient reports decreased shoulder pain as 0/10.  She tolerated progressed therapeutic exercises well without significant pain or difficulty.   There was no guarding of shoulder observed.  She reported increased comfort at end of session.  She participates well.  Will progress per patient tolerance.        Ace is progressing well towards her goals and there are no updates to goals at this time. Pt prognosis is Good.     Patient will continue to benefit from skilled outpatient occupational therapy to address the deficits listed in the problem list on initial evaluation provide patient/family education and to maximize patient's level of independence in the home and community environment.     Patient's spiritual, cultural and educational needs considered and patient agreeable to plan of care and goals.    Anticipated barriers to  occupational therapy: therapy attendance, compliance with HEP    Goals:  Short Term Goals: (in 4 weeks)  1) Patient will be independent in HEP in 1-2 visits -Progressing   2) Decrease pain in left shoulder to no more than 4-5/10 at worst in ADL/IADL's-Progressing   3) Increase left shoulder strength by 1 muscle grade for increased functional use in ADL/IADL's-Progressing   4) Patient will perform left shoulder minimally resistive exercises to 90 degrees in sagittal/frontal planes for 2 sets X 10 reps without reports of increased pain for increased functional use in ADL/IADL's. -Progressing        Long Term Goals: (in 8 weeks)  1) Decrease pain in left shoulder to no more than 2-3/10 at worst in ADL/IADL's  2) Increase strength in left shoulder to WFL for improved functioning in ADL/IADL's  3) Increased functioning in ADL/IADL's, as evidenced by a FOTO impairment rating of no more than 74  4) Patient will perform left upper extremity forward reaching into cabinet with moderate weight for 2 sets X 10 reps for increased functional use in ADL/IADL's.         Plan   Plan of Care Certification: 8/29/2024 to 10/25/2024.      Outpatient Occupational Therapy 2 times weekly for 8 weeks to include the following interventions: Manual therapy/joint mobilizations, Modalities for pain management, US 3 mhz, Therapeutic exercises/activities., and Strengthening.     Updates/Grading for next session: initiate modalities as needed, initiate soft tissue mobilization as needed, progress strengthening exercises as tolerated     Shaka Tello OT   10/24/2024

## 2024-11-11 ENCOUNTER — CLINICAL SUPPORT (OUTPATIENT)
Dept: REHABILITATION | Facility: HOSPITAL | Age: 34
End: 2024-11-11
Payer: MEDICAID

## 2024-11-11 DIAGNOSIS — G89.29 CHRONIC LEFT SHOULDER PAIN: Primary | ICD-10-CM

## 2024-11-11 DIAGNOSIS — M25.512 CHRONIC LEFT SHOULDER PAIN: Primary | ICD-10-CM

## 2024-11-11 DIAGNOSIS — R53.1 DECREASED STRENGTH: ICD-10-CM

## 2024-11-11 PROCEDURE — 97530 THERAPEUTIC ACTIVITIES: CPT | Mod: PO

## 2024-11-11 NOTE — PROGRESS NOTES
"[facility]  Occupational Therapy Treatment Note     Date: 11/11/2024  Name: Ace Sheffield  Clinic Number: [id number]    Therapy Diagnosis:   Encounter Diagnoses   Name Primary?    Chronic left shoulder pain Yes    Decreased strength        Physician: Lyn Neville MD    Physician Orders: Eval and Treat  Medical Diagnosis: M25.512,G89.29 (ICD-10-CM) - Chronic left shoulder pain  Surgical Procedure and Date: N/A , N/A   Evaluation Date: 8/29/2024  Insurance Authorization Period Expiration: 8/12/2025   Plan of Care Certification Period: 8/29/2024 - 10/25/2024  Date of Return to MD: none noted in chart for referring provider at this time   Visit # / Visits authorized: 5 / 10    FOTO: 1/ 3 Initial=68 / Goal=74       Precautions:  Standard     Time In: 1017  Time Out: 1055  Total Billable Time: 38 minutes (3 TA)       Subjective     Patient reports:  "It's good."      She was compliant with home exercise program given last session.   Response to previous treatment: good tolerance with no pain reported   Functional change: no issues at work    Pain: 0/10 at rest and with movement   Location: left shoulder      Objective     Objective Measures updated at progress report unless specified.    Observation: no guarding postures noted in seated or standing postures.  Guarding is noted with active motion of the shoulder.      Posture: minimally forward rounded shoulders      Active/Passive Range of Motion: Measured in degrees     Shoulder Left Right      AROM  AROM    Flexion WNL   WNL    Abduction WNL  WNL    ER at 0 WNL  WNL    ER at 90 WNL  WNL    IR WNL  WNL    *All motion is WNL, but movement is slow and guarded.            Upper Extremity Strength    (L) UE (R) UE   Shoulder flexion: 4/5 5/5   Shoulder Abduction: 4-/5* 5/5   Shoulder ER 4-/5 5/5   Shoulder IR 4-/5* 5/5   Lower Trap 4-/5 5/5   Middle Trap 4-/5 5/5   Rhomboids 4-/5 5/5         Special Tests:  AC Joint Left   AC Joint Compression " Test Negative     Empty Can Test Positive       Drop Arm test Negative     Subscaputlaris Lift Off Positive     Hawkin's Kenndy Positive     Neer's Test Positive     Speed's test Positive           Joint Mobility: minimal tightness at end ranges of flexion/abduction/ external rotation      Palpation: TTP anterior shoulder/bicipital groove, infraspinatus      Sensation:  Reports occasional numbness/tingling of 4th and 5th digits when leaning on elbow or holding phone.  Light touch, temperature, sharp and dull are grossly intact in left upper arm/shoulder.       Flexibility: normal      Scapular Control/Dyskinesis:     Normal / Subtle / Obvious  Comments    Left  normal N/A     Right  Normal  N/A          Treatment     Ace received the treatments listed below:      Patient received MH x 5 min to left shoulder to increase blood flow, circulation and tissue elasticity prior to therex     Manual therapy techniques: Soft tissue Mobilization and cross Friction Massage were applied to the: left shoulder (anterior aspect) for 0 minutes, including:  - use of hand techniques, cupping (not today) and IASTM tools (not today) to increase blood flow/circulation, improve soft tissue pliability and decrease pain.    therapeutic exercises to develop strength, endurance, ROM, flexibility, and posture for 33 minutes including:    - UBE, Level 1.5, forwards/backwards 6 min (split equally)       Seated:   - shoulder press, chest press, shoulder flexion, shoulder abduction 1# x 1'  - ball on wall, standing CW/CCW 1 min each direction   - bicep curls with 3# dowel 2x10    Theraband, yellow:   -lat pulls 2/10   -shoulder extension 2/10   -shoulder adduction 2/10   -low row 2 /10    -high row 2/10  -forward punch x 20  -triceps extension x 20  -horizontal abduction x 20  -no monies X 30  -band pulses 2x10   -band pulses shoulder ER 2x10    Patient Education and Home Exercises     Education provided:   -Issued/reviewed theraband HEP;  issued yellow theraband 10/24/2024    -application of ice for post session soreness 9/26/2024  -Issued additional HEP stretches 9/26/2024  -Issued/reviewed initial HEP    -role of OT, goals for OT, scheduling/cancellations, insurance limitations with patient.  - Progress towards goals     Written Home Exercises Provided: Yes.  Exercises were reviewed and Ace was able to demonstrate them prior to the end of the session.  Ace demonstrated good  understanding of the home exercise program provided. See electronic medical record under Patient Instructions for exercises provided during therapy sessions.       Assessment   Ace Sheffield is a 34 y.o. female referred to outpatient occupational therapy and presents with a medical diagnosis of M25.512,G89.29 (ICD-10-CM) - Chronic left shoulder pain. Patient reports decreased shoulder pain as 0/10.  She tolerated therapeutic exercises well without significant pain or difficulty.   There was no guarding of shoulder observed. She reported no discomfort or pain post session. She participated well though mild fatigue noted. She expressed at the end of the session that she is possibly ready for discharge from therapy as her pain levels have significantly improved. Will progress per patient tolerance.      Ace is progressing well towards her goals and there are no updates to goals at this time. Pt prognosis is Good.     Patient will continue to benefit from skilled outpatient occupational therapy to address the deficits listed in the problem list on initial evaluation provide patient/family education and to maximize patient's level of independence in the home and community environment.     Patient's spiritual, cultural and educational needs considered and patient agreeable to plan of care and goals.    Anticipated barriers to occupational therapy: therapy attendance, compliance with HEP    Goals:  Short Term Goals: (in 4 weeks)  1) Patient will be independent in HEP  in 1-2 visits -Progressing   2) Decrease pain in left shoulder to no more than 4-5/10 at worst in ADL/IADL's-Progressing   3) Increase left shoulder strength by 1 muscle grade for increased functional use in ADL/IADL's-Progressing   4) Patient will perform left shoulder minimally resistive exercises to 90 degrees in sagittal/frontal planes for 2 sets X 10 reps without reports of increased pain for increased functional use in ADL/IADL's. -Progressing        Long Term Goals: (in 8 weeks)  1) Decrease pain in left shoulder to no more than 2-3/10 at worst in ADL/IADL's  2) Increase strength in left shoulder to WFL for improved functioning in ADL/IADL's  3) Increased functioning in ADL/IADL's, as evidenced by a FOTO impairment rating of no more than 74  4) Patient will perform left upper extremity forward reaching into cabinet with moderate weight for 2 sets X 10 reps for increased functional use in ADL/IADL's.         Plan   Plan of Care Certification: 8/29/2024 to 10/25/2024.      Outpatient Occupational Therapy 2 times weekly for 8 weeks to include the following interventions: Manual therapy/joint mobilizations, Modalities for pain management, US 3 mhz, Therapeutic exercises/activities., and Strengthening.     Updates/Grading for next session: initiate modalities as needed, initiate soft tissue mobilization as needed, progress strengthening exercises as tolerated     Preeti Recinos OT   11/11/2024

## 2024-11-14 ENCOUNTER — CLINICAL SUPPORT (OUTPATIENT)
Dept: REHABILITATION | Facility: HOSPITAL | Age: 34
End: 2024-11-14
Payer: MEDICAID

## 2024-11-14 DIAGNOSIS — R53.1 DECREASED STRENGTH: ICD-10-CM

## 2024-11-14 DIAGNOSIS — M25.512 CHRONIC LEFT SHOULDER PAIN: Primary | ICD-10-CM

## 2024-11-14 DIAGNOSIS — G89.29 CHRONIC LEFT SHOULDER PAIN: Primary | ICD-10-CM

## 2024-11-14 PROCEDURE — 97530 THERAPEUTIC ACTIVITIES: CPT | Mod: PO

## 2024-11-14 NOTE — PLAN OF CARE
"[facility]  OT Discharge Note    Name: Aec Sheffield  Clinic Number: [id number]    Therapy Diagnosis: No diagnosis found.  Physician: Lyn Neville MD    Physician Orders: Evaluation and treat   Medical Diagnosis: M25.512,G89.29 (ICD-10-CM) - Chronic left shoulder pain   Evaluation Date: 08/29/2024      Date of Last visit: 11/14/2024  Total Visits Received: 5    Time In: 10:13  Time Out: 10:45  Total Time: 32 Mins  Billable: 28 Mins      Subjective     Pt reports: "It really doesn't hurt."     Patient reported she was compliant with home exercise program given last session.   Response to previous treatment: Good  Functional change: n/a    Pre session pain: 0/10  Location: Left shoulder     Post session pain: 0/10  Location: left shoulder    Objective     Scapular Control/Dyskinesis:     Normal / Subtle / Obvious  Comments    Left  normal N/A     Right  Normal  N/A           Active / Passive Range of Motion: Measured in degrees    Shoulder Left Right   Flexion WNL WNL   Abduction WNL WNL   ER at 0 WNL WNL   ER at 90 WNL WNL   IR at 90 WNL WNL   Extension WNL WNL   Horz ADD WNL WNL   Horz ABD WNL WNL       Upper Extremity Strength:    Shoulder Left Right   Shoulder Flexion: 5/5 5/5   Shoulder Abduction: 5/5 5/5   Shoulder ER 4+/5 5/5   Shoulder IR 4+/5 5/5   Shoulder Horz ADD 5/5 5/5   Shoulder Horz ABD 5/5 5/5   Shoulder Extension 5/5 5/5         Sensation:    Patient verbally reported occasional numbness or tingling in (L) upper extremities at elbow after holding phone in hand for extended periods of time.  Patient verbally reported no loss of protective sensation.   Patient verbally reported 100% accuracy with hot / cold discrimination  Patient demonstrated 100% accuracy with sharp / dull discrimination.    Palpation: Patient did not report or demonstrate any s/s of pain with light touch or palpation.     Flexibility: Appeared within normal limits    Joint Mobility: within normal limits, " no notable tightness or decreased range.       Limitation/Restriction for FOTO Shoulder Survey    Therapist reviewed FOTO scores for Ace Sheffield on 11/14/2024.   FOTO documents entered into [employer] - see Media section.    Intake: 68  Limitation Score: 32%  Predicted: 81  Final: 96         Assessment     Patient was discharged at this time secondary to improvement in overall functional use left upper extremities, decreased reports of pain at rest and with use and meeting all short term and long goals. Objectively, patient has demonstrated an improvement in left upper extremity strength and decreased in pain with motion. Subjectively, patient reports that she no longer feels pain when performing job duties such as reaching and lifting. Patient denied any significant amount of pain or discomfort within last two weeks. Patient FOTO score also improved and exceeded predicted score. Patient's attendance for therapy considerably low with multiple cancelled appointments for various reasons, however patient reported compliance with  home exercise program. Despite low therapy attendance, per chart review, patient appeared to be cooperative and tolerated interventions well. Based on objective and subjective results, patient to be discharged from outpatient therapy services following an improvement/cessation of previous complaints of left shoulder pain. Patient educated on importance of continuing home exercise program to maintain gains. Patient educated and encouraged to reach out to this facility and referring PCP if symptoms return and/or worsen. Patient verbalized understanding of all education and agreeable to discharge.      Discharge reason: Patient is now asymptomatic and Patient has met all of his/her goals    Discharge FOTO Score: 91 / 100    Goals:    Short Term Goals: (in 4 weeks)  1) Patient will be independent in HEP in 1-2 visits. MET 11/14/24  2) Decrease pain in left shoulder to no more than 4-5/10 at worst in  ADL/IADL's MET 11/14/24  3) Increase left shoulder strength by 1 muscle grade for increased functional use in ADL/IADL's MET 11/14/24  4) Patient will perform left shoulder minimally resistive exercises to 90 degrees in sagittal/frontal planes for 2 sets X 10 reps without reports of increased pain for increased functional use in ADL/IADL's.   MET 11/14/24      Long Term Goals: (in 8 weeks)  1) Decrease pain in left shoulder to no more than 2-3/10 at worst in ADL/IADL's MET 11/14/24  2) Increase strength in left shoulder to WFL for improved functioning in ADL/IADL's MET 11/14/24  3) Increased functioning in ADL/IADL's, as evidenced by a FOTO impairment rating of no more than 74 MET 11/14/24  4) Patient will perform left upper extremity forward reaching into cabinet with moderate weight for 2 sets X 10 reps for increased functional use in ADL/IADL's. Simulated task; MET 11/14/24        Plan     This patient is discharged from Occupational Therapy      Jessica Mckinney, OT

## 2024-11-22 ENCOUNTER — PATIENT MESSAGE (OUTPATIENT)
Dept: RESEARCH | Facility: HOSPITAL | Age: 34
End: 2024-11-22
Payer: MEDICAID

## 2025-08-18 ENCOUNTER — HOSPITAL ENCOUNTER (EMERGENCY)
Facility: HOSPITAL | Age: 35
Discharge: HOME | End: 2025-08-18
Attending: FAMILY MEDICINE
Payer: COMMERCIAL

## 2025-08-18 VITALS
BODY MASS INDEX: 48.15 KG/M2 | RESPIRATION RATE: 20 BRPM | HEART RATE: 90 BPM | OXYGEN SATURATION: 99 % | TEMPERATURE: 98 F | WEIGHT: 289 LBS | SYSTOLIC BLOOD PRESSURE: 162 MMHG | DIASTOLIC BLOOD PRESSURE: 128 MMHG | HEIGHT: 65 IN

## 2025-08-18 DIAGNOSIS — K13.0 RASH ON LIPS: Primary | ICD-10-CM

## 2025-08-18 PROCEDURE — 99283 EMERGENCY DEPT VISIT LOW MDM: CPT | Mod: ER

## 2025-08-18 RX ORDER — MUPIROCIN 20 MG/G
OINTMENT TOPICAL 3 TIMES DAILY
Qty: 15 G | Refills: 0 | Status: SHIPPED | OUTPATIENT
Start: 2025-08-18 | End: 2025-08-23

## (undated) DEVICE — PORT ACCESS 8MM W/120MM LOW

## (undated) DEVICE — DRAPE TOP 53X102IN

## (undated) DEVICE — NDL INSUF ULTRA VERESS 120MM

## (undated) DEVICE — PAD CNTOUR SUP-ABSRB POSTPRTM

## (undated) DEVICE — GLOVE BIOGEL PIMICRO INDIC 6.5

## (undated) DEVICE — SYR 50CC LL

## (undated) DEVICE — SYR 10CC LUER LOCK

## (undated) DEVICE — SET TRI-LUMEN FILTERED TUBE

## (undated) DEVICE — SUT VICRYL PLUS 0 CT1 36IN

## (undated) DEVICE — SET CYSTO IRRIGATION UNIV SPIK

## (undated) DEVICE — DRESSING AQUACEL SACRAL 9 X 9

## (undated) DEVICE — TOWEL OR DISP STRL BLUE 4/PK

## (undated) DEVICE — COVER TABLE HVY DTY 60X90IN

## (undated) DEVICE — PANTIES FEMININE NAPKIN LG/XLG

## (undated) DEVICE — BANDAGE ADHESIVE PLAS STRL 1X3

## (undated) DEVICE — SOL CLEARIFY VISUALIZATION LAP

## (undated) DEVICE — DRAPE LEGGINGS CUFF 33X51IN

## (undated) DEVICE — GLOVE BIOGEL ULTRATOUCH 6.5

## (undated) DEVICE — BLADE SURG STAINLESS STEEL #10

## (undated) DEVICE — TRAY SKIN SCRUB WET PREMIUM

## (undated) DEVICE — STRIP MEDI WND CLSR 1/2X4IN

## (undated) DEVICE — NDL HYPO REG 25G X 1 1/2

## (undated) DEVICE — ELECTRODE REM PLYHSV RETURN 9

## (undated) DEVICE — DRAPE ARM DAVINCI XI

## (undated) DEVICE — SUT 0 V-LOC GR GS-21 TAPER

## (undated) DEVICE — COVER MAYO STND XL 30X57IN

## (undated) DEVICE — DRAPE UINDERBUT GRAD PCH

## (undated) DEVICE — SEAL UNIVERSAL 5MM-8MM XI

## (undated) DEVICE — TIP RUMI YELLOW 5.1MM 3.75CM

## (undated) DEVICE — COVER LIGHT HANDLE 80/CA

## (undated) DEVICE — PAD PINK TRENDELENBURG POS XL

## (undated) DEVICE — TRAY FOLEY 16FR INFECTION CONT

## (undated) DEVICE — GLOVE SURGICAL LATEX SZ 6.5

## (undated) DEVICE — COVER TIP CURVED SCISSORS XI

## (undated) DEVICE — Device

## (undated) DEVICE — IRRIGATOR ENDOSCOPY DISP.

## (undated) DEVICE — OBTURATOR BLADELESS 8MM XI CLR